# Patient Record
Sex: MALE | Race: WHITE | NOT HISPANIC OR LATINO | Employment: OTHER | URBAN - NONMETROPOLITAN AREA
[De-identification: names, ages, dates, MRNs, and addresses within clinical notes are randomized per-mention and may not be internally consistent; named-entity substitution may affect disease eponyms.]

---

## 2023-02-15 PROBLEM — N40.0 BPH (BENIGN PROSTATIC HYPERPLASIA): Status: ACTIVE | Noted: 2023-02-15

## 2023-02-15 PROBLEM — G47.00 INSOMNIA: Status: ACTIVE | Noted: 2023-02-15

## 2023-02-15 PROBLEM — R03.0 ELEVATED BP WITHOUT DIAGNOSIS OF HYPERTENSION: Status: ACTIVE | Noted: 2023-02-15

## 2023-02-15 PROBLEM — R73.9 ELEVATED BLOOD SUGAR: Status: ACTIVE | Noted: 2023-02-15

## 2023-02-15 PROBLEM — F51.01 PRIMARY INSOMNIA: Status: ACTIVE | Noted: 2023-02-15

## 2023-02-15 PROBLEM — M54.12 CERVICAL RADICULOPATHY: Status: ACTIVE | Noted: 2023-02-15

## 2023-02-15 PROBLEM — T70.20XA ALTITUDE SICKNESS: Status: ACTIVE | Noted: 2023-02-15

## 2023-02-15 PROBLEM — R20.0 HAND NUMBNESS: Status: ACTIVE | Noted: 2023-02-15

## 2023-02-15 PROBLEM — M48.02 NEURAL FORAMINAL STENOSIS OF CERVICAL SPINE: Status: ACTIVE | Noted: 2023-02-15

## 2023-02-15 PROBLEM — H61.21 IMPACTED CERUMEN OF RIGHT EAR: Status: ACTIVE | Noted: 2023-02-15

## 2023-02-15 PROBLEM — J02.9 ACUTE PHARYNGITIS: Status: ACTIVE | Noted: 2023-02-15

## 2023-02-15 PROBLEM — B19.20 UNSPECIFIED VIRAL HEPATITIS C WITHOUT HEPATIC COMA: Status: ACTIVE | Noted: 2023-02-15

## 2023-02-15 PROBLEM — M54.16 RIGHT LUMBAR RADICULOPATHY: Status: ACTIVE | Noted: 2023-02-15

## 2023-02-15 PROBLEM — H93.13 SUBJECTIVE TINNITUS, BILATERAL: Status: ACTIVE | Noted: 2023-02-15

## 2023-02-15 PROBLEM — M70.41 PREPATELLAR BURSITIS OF RIGHT KNEE: Status: ACTIVE | Noted: 2023-02-15

## 2023-02-15 RX ORDER — HYDROCODONE BITARTRATE AND ACETAMINOPHEN 7.5; 325 MG/1; MG/1
1 TABLET ORAL 3 TIMES DAILY PRN
COMMUNITY
Start: 2018-06-20 | End: 2023-03-28 | Stop reason: SDUPTHER

## 2023-02-15 RX ORDER — SODIUM FLUORIDE 5 MG/G
1 PASTE, DENTIFRICE DENTAL DAILY
COMMUNITY
Start: 2017-12-07 | End: 2023-03-28 | Stop reason: SINTOL

## 2023-02-15 RX ORDER — ZOLPIDEM TARTRATE 10 MG/1
10 TABLET ORAL NIGHTLY PRN
COMMUNITY
Start: 2016-03-07 | End: 2023-03-28 | Stop reason: SDUPTHER

## 2023-02-15 RX ORDER — CYCLOBENZAPRINE HCL 10 MG
10 TABLET ORAL 2 TIMES DAILY PRN
COMMUNITY
Start: 2020-05-06 | End: 2023-09-19 | Stop reason: SDUPTHER

## 2023-02-15 RX ORDER — TAMSULOSIN HYDROCHLORIDE 0.4 MG/1
0.4 CAPSULE ORAL NIGHTLY
COMMUNITY
Start: 2021-07-15 | End: 2023-03-28 | Stop reason: SDUPTHER

## 2023-03-28 ENCOUNTER — OFFICE VISIT (OUTPATIENT)
Dept: PRIMARY CARE | Facility: CLINIC | Age: 68
End: 2023-03-28
Payer: MEDICARE

## 2023-03-28 VITALS
BODY MASS INDEX: 22.62 KG/M2 | SYSTOLIC BLOOD PRESSURE: 138 MMHG | WEIGHT: 167 LBS | HEART RATE: 69 BPM | DIASTOLIC BLOOD PRESSURE: 82 MMHG | OXYGEN SATURATION: 98 % | HEIGHT: 72 IN

## 2023-03-28 DIAGNOSIS — M54.12 CERVICAL RADICULOPATHY: ICD-10-CM

## 2023-03-28 DIAGNOSIS — Z00.00 MEDICARE WELCOME EXAM: ICD-10-CM

## 2023-03-28 DIAGNOSIS — R76.8 POSITIVE HEPATITIS C ANTIBODY TEST: ICD-10-CM

## 2023-03-28 DIAGNOSIS — H93.13 SUBJECTIVE TINNITUS, BILATERAL: ICD-10-CM

## 2023-03-28 DIAGNOSIS — M48.02 NEURAL FORAMINAL STENOSIS OF CERVICAL SPINE: ICD-10-CM

## 2023-03-28 DIAGNOSIS — Z00.00 ROUTINE GENERAL MEDICAL EXAMINATION AT HEALTH CARE FACILITY: Primary | ICD-10-CM

## 2023-03-28 DIAGNOSIS — Z13.6 SCREENING FOR CARDIOVASCULAR CONDITION: ICD-10-CM

## 2023-03-28 DIAGNOSIS — Z23 NEED FOR VACCINATION: ICD-10-CM

## 2023-03-28 DIAGNOSIS — Z00.00 ROUTINE GENERAL MEDICAL EXAMINATION AT A HEALTH CARE FACILITY: ICD-10-CM

## 2023-03-28 DIAGNOSIS — F51.01 PRIMARY INSOMNIA: ICD-10-CM

## 2023-03-28 DIAGNOSIS — N40.1 BENIGN PROSTATIC HYPERPLASIA WITH LOWER URINARY TRACT SYMPTOMS, SYMPTOM DETAILS UNSPECIFIED: ICD-10-CM

## 2023-03-28 DIAGNOSIS — H61.23 CERUMEN DEBRIS ON TYMPANIC MEMBRANE OF BOTH EARS: ICD-10-CM

## 2023-03-28 PROBLEM — R73.9 ELEVATED BLOOD SUGAR: Status: RESOLVED | Noted: 2023-02-15 | Resolved: 2023-03-28

## 2023-03-28 PROBLEM — B19.20 UNSPECIFIED VIRAL HEPATITIS C WITHOUT HEPATIC COMA: Status: RESOLVED | Noted: 2023-02-15 | Resolved: 2023-03-28

## 2023-03-28 PROBLEM — J02.9 ACUTE PHARYNGITIS: Status: RESOLVED | Noted: 2023-02-15 | Resolved: 2023-03-28

## 2023-03-28 PROBLEM — H61.21 IMPACTED CERUMEN OF RIGHT EAR: Status: RESOLVED | Noted: 2023-02-15 | Resolved: 2023-03-28

## 2023-03-28 PROBLEM — M70.41 PREPATELLAR BURSITIS OF RIGHT KNEE: Status: RESOLVED | Noted: 2023-02-15 | Resolved: 2023-03-28

## 2023-03-28 PROBLEM — G47.00 INSOMNIA: Status: RESOLVED | Noted: 2023-02-15 | Resolved: 2023-03-28

## 2023-03-28 PROBLEM — R03.0 ELEVATED BP WITHOUT DIAGNOSIS OF HYPERTENSION: Status: RESOLVED | Noted: 2023-02-15 | Resolved: 2023-03-28

## 2023-03-28 PROBLEM — R20.0 HAND NUMBNESS: Status: RESOLVED | Noted: 2023-02-15 | Resolved: 2023-03-28

## 2023-03-28 PROCEDURE — G0403 EKG FOR INITIAL PREVENT EXAM: HCPCS | Performed by: FAMILY MEDICINE

## 2023-03-28 PROCEDURE — 69210 REMOVE IMPACTED EAR WAX UNI: CPT | Performed by: FAMILY MEDICINE

## 2023-03-28 PROCEDURE — 1036F TOBACCO NON-USER: CPT | Performed by: FAMILY MEDICINE

## 2023-03-28 PROCEDURE — 99213 OFFICE O/P EST LOW 20 MIN: CPT | Performed by: FAMILY MEDICINE

## 2023-03-28 PROCEDURE — 90677 PCV20 VACCINE IM: CPT | Performed by: FAMILY MEDICINE

## 2023-03-28 PROCEDURE — G0402 INITIAL PREVENTIVE EXAM: HCPCS | Performed by: FAMILY MEDICINE

## 2023-03-28 PROCEDURE — G0009 ADMIN PNEUMOCOCCAL VACCINE: HCPCS | Performed by: FAMILY MEDICINE

## 2023-03-28 PROCEDURE — 1170F FXNL STATUS ASSESSED: CPT | Performed by: FAMILY MEDICINE

## 2023-03-28 RX ORDER — HYDROCODONE BITARTRATE AND ACETAMINOPHEN 7.5; 325 MG/1; MG/1
1 TABLET ORAL 3 TIMES DAILY PRN
Qty: 20 TABLET | Refills: 0 | Status: SHIPPED | OUTPATIENT
Start: 2023-03-28 | End: 2024-05-29 | Stop reason: SDUPTHER

## 2023-03-28 RX ORDER — AMOXICILLIN 500 MG
CAPSULE ORAL
COMMUNITY

## 2023-03-28 RX ORDER — PNEUMOCOCCAL 20-VALENT CONJUGATE VACCINE 2.2; 2.2; 2.2; 2.2; 2.2; 2.2; 2.2; 2.2; 2.2; 2.2; 2.2; 2.2; 2.2; 2.2; 2.2; 2.2; 4.4; 2.2; 2.2; 2.2 UG/.5ML; UG/.5ML; UG/.5ML; UG/.5ML; UG/.5ML; UG/.5ML; UG/.5ML; UG/.5ML; UG/.5ML; UG/.5ML; UG/.5ML; UG/.5ML; UG/.5ML; UG/.5ML; UG/.5ML; UG/.5ML; UG/.5ML; UG/.5ML; UG/.5ML; UG/.5ML
0.5 INJECTION, SUSPENSION INTRAMUSCULAR ONCE
Qty: 0.5 ML | Refills: 0 | Status: SHIPPED | OUTPATIENT
Start: 2023-03-28 | End: 2023-03-28 | Stop reason: ALTCHOICE

## 2023-03-28 RX ORDER — ZOLPIDEM TARTRATE 10 MG/1
10 TABLET ORAL NIGHTLY PRN
Qty: 30 TABLET | Refills: 5 | Status: SHIPPED | OUTPATIENT
Start: 2023-03-28 | End: 2023-09-19 | Stop reason: SDUPTHER

## 2023-03-28 RX ORDER — TAMSULOSIN HYDROCHLORIDE 0.4 MG/1
0.4 CAPSULE ORAL NIGHTLY
Qty: 90 CAPSULE | Refills: 3 | Status: SHIPPED | OUTPATIENT
Start: 2023-03-28 | End: 2024-04-25

## 2023-03-28 ASSESSMENT — ENCOUNTER SYMPTOMS
UNEXPECTED WEIGHT CHANGE: 0
HEMATURIA: 0
DIFFICULTY URINATING: 0
NERVOUS/ANXIOUS: 0
SEIZURES: 0
TROUBLE SWALLOWING: 0
JOINT SWELLING: 0
APPETITE CHANGE: 0
CONFUSION: 0
FEVER: 0
SHORTNESS OF BREATH: 0
PALPITATIONS: 0
CONSTIPATION: 0
NUMBNESS: 1
DIARRHEA: 0
BLOOD IN STOOL: 0
OCCASIONAL FEELINGS OF UNSTEADINESS: 0
LOSS OF SENSATION IN FEET: 0
COLOR CHANGE: 0
DEPRESSION: 0

## 2023-03-28 ASSESSMENT — PATIENT HEALTH QUESTIONNAIRE - PHQ9
1. LITTLE INTEREST OR PLEASURE IN DOING THINGS: NOT AT ALL
1. LITTLE INTEREST OR PLEASURE IN DOING THINGS: NOT AT ALL
SUM OF ALL RESPONSES TO PHQ9 QUESTIONS 1 AND 2: 0
2. FEELING DOWN, DEPRESSED OR HOPELESS: NOT AT ALL
2. FEELING DOWN, DEPRESSED OR HOPELESS: NOT AT ALL
SUM OF ALL RESPONSES TO PHQ9 QUESTIONS 1 AND 2: 0
2. FEELING DOWN, DEPRESSED OR HOPELESS: NOT AT ALL
SUM OF ALL RESPONSES TO PHQ9 QUESTIONS 1 AND 2: 0
1. LITTLE INTEREST OR PLEASURE IN DOING THINGS: NOT AT ALL

## 2023-03-28 ASSESSMENT — ACTIVITIES OF DAILY LIVING (ADL)
DOING_HOUSEWORK: INDEPENDENT
GROCERY_SHOPPING: INDEPENDENT
BATHING: INDEPENDENT
MANAGING_FINANCES: INDEPENDENT
GROCERY_SHOPPING: INDEPENDENT
BATHING: INDEPENDENT
BATHING: INDEPENDENT
DRESSING: INDEPENDENT
TAKING_MEDICATION: INDEPENDENT
TAKING_MEDICATION: INDEPENDENT
MANAGING_FINANCES: INDEPENDENT
DRESSING: INDEPENDENT
DOING_HOUSEWORK: INDEPENDENT
GROCERY_SHOPPING: INDEPENDENT
TAKING_MEDICATION: INDEPENDENT
DRESSING: INDEPENDENT
DOING_HOUSEWORK: INDEPENDENT
MANAGING_FINANCES: INDEPENDENT

## 2023-03-28 NOTE — PROGRESS NOTES
Subjective   Reason for Visit: Kike Conde is an 67 y.o. male here for a Medicare Wellness visit.               Left sided cervical radiculopathy:  Saw NS and not surgerical   No pain or weakness but having numbness  Not interested in further testing at this point    Had plans for skiing but got covid     Htn:  Bp at home is 120-138/81-88  Very active  Checking BP multiple times a day    Hx of cerumen impactation    Insomnia:  Hx of anxiety  Failed melatonin        Patient Care Team:  Clint FIGUEROA DO as PCP - General  Clint FIGUEROA DO as PCP - [employer]vantage PCP     Review of Systems   Constitutional:  Negative for appetite change, fever and unexpected weight change.   HENT:  Negative for congestion and trouble swallowing.    Eyes:  Negative for visual disturbance.   Respiratory:  Negative for shortness of breath.    Cardiovascular:  Negative for chest pain, palpitations and leg swelling.   Gastrointestinal:  Negative for blood in stool, constipation and diarrhea.   Genitourinary:  Negative for difficulty urinating and hematuria.   Musculoskeletal:  Negative for gait problem and joint swelling.   Skin:  Negative for color change.   Allergic/Immunologic: Negative for immunocompromised state.   Neurological:  Positive for numbness. Negative for seizures and syncope.   Psychiatric/Behavioral:  Negative for confusion and suicidal ideas. The patient is not nervous/anxious.        Objective   Vitals:  /82   Pulse 69   Ht 1.829 m (6')   Wt 75.8 kg (167 lb)   SpO2 98%   BMI 22.65 kg/m²       Physical Exam  Constitutional:       General: He is not in acute distress.     Appearance: Normal appearance. He is not ill-appearing.   HENT:      Head: Normocephalic and atraumatic.      Right Ear: Tympanic membrane normal. There is impacted cerumen.      Left Ear: Tympanic membrane normal. There is impacted cerumen.      Nose: Nose normal.      Mouth/Throat:      Mouth: Mucous membranes are moist.    Eyes:      Pupils: Pupils are equal, round, and reactive to light.   Cardiovascular:      Rate and Rhythm: Normal rate and regular rhythm.      Heart sounds: No murmur heard.     No friction rub. No gallop.   Pulmonary:      Effort: Pulmonary effort is normal.      Breath sounds: Normal breath sounds.   Abdominal:      General: Abdomen is flat. There is no distension.      Palpations: Abdomen is soft.      Tenderness: There is no abdominal tenderness. There is no guarding.      Hernia: No hernia is present.   Musculoskeletal:         General: Normal range of motion.   Skin:     General: Skin is warm and dry.   Neurological:      General: No focal deficit present.      Mental Status: He is alert. Mental status is at baseline.      Cranial Nerves: No cranial nerve deficit.      Motor: No weakness.      Gait: Gait normal.   Psychiatric:         Mood and Affect: Mood normal.         Behavior: Behavior normal.         Thought Content: Thought content normal.         Judgment: Judgment normal.       Assessment/Plan   Problem List Items Addressed This Visit          Nervous    Cervical radiculopathy    Primary insomnia    Relevant Medications    zolpidem (Ambien) 10 mg tablet       Genitourinary    BPH (benign prostatic hyperplasia)    Relevant Medications    tamsulosin (Flomax) 0.4 mg 24 hr capsule       Musculoskeletal    Neural foraminal stenosis of cervical spine    Relevant Medications    HYDROcodone-acetaminophen (Norco) 7.5-325 mg tablet       Other    Subjective tinnitus, bilateral    Positive hepatitis C antibody test    Overview     Neg viral load         Medicare welcome exam - Primary     Other Visit Diagnoses       Routine general medical examination at a health care facility        Need for vaccination        Relevant Medications    pneumoc 20-les conj-dip cr,PF, (Prevnar 20, PF,) 0.5 mL vaccine            Assessment:  -Alcohol use: approx 2-3 glasses of wine     -Hep C: Negative viral load    -Insomnia:  Contract: 1/13/22, UDS: 1/13/22, OARRS: each refill    -Right-sided lumbar radiculopathy: 5/21: +lumbar mild L4/5 spinal stenosis and L5/S1 carmencita stenosis- status post discectomy    -tinnutitus: ent workup neg    -BPH: +urgency- mild symptoms    -Left sided cervical radiculopathy: severe carmencita forminal stenosis and mod spinal   Eval by NS- no surgery needed  No pain just numbness in left hand    -Elevated BP:  BP at home are ok will do diet and continue exercise. We discussed processed foods      Health Maintenance Reminder:  -Medicare: 2024  -Preventative: next visit   -Blood Work: 9/23  -PSA: 9/23  -Hep C: +   -Colonoscopy: 5/29  -Shingrix: completed  -Prevnar 20: completed   -Flu: Yearly

## 2023-03-28 NOTE — PROGRESS NOTES
Subjective   Reason for Visit: Kike Conde is an 67 y.o. male here for a Medicare Wellness visit.     Elevated BP:  At home bp is improved. Brought in list of BP    Cervical forminal stenosis:  Left hand numbness but no pain/weakness  Went to NS and PT and no further workup needed    Insomnia:  On ambien  Failed otc options  Has been on it for multiple years  Contract signed today    LBP:  Used pain medicine rarely- only when doing big hikes         Patient Care Team:  Clint FIGUEROA DO as PCP - General  Clint FIGUEROA DO as PCP - [employer]vantage PCP     Review of Systems   Constitutional:  Negative for appetite change, fever and unexpected weight change.   HENT:  Negative for congestion and trouble swallowing.    Eyes:  Negative for visual disturbance.   Respiratory:  Negative for shortness of breath.    Cardiovascular:  Negative for chest pain, palpitations and leg swelling.   Gastrointestinal:  Negative for blood in stool, constipation and diarrhea.   Genitourinary:  Negative for difficulty urinating and hematuria.   Musculoskeletal:  Negative for gait problem and joint swelling.   Skin:  Negative for color change.   Allergic/Immunologic: Negative for immunocompromised state.   Neurological:  Positive for numbness (left hand). Negative for seizures and syncope.   Psychiatric/Behavioral:  Negative for confusion and suicidal ideas. The patient is not nervous/anxious.        Objective   Vitals:  /82   Pulse 69   Ht 1.829 m (6')   Wt 75.8 kg (167 lb)   SpO2 98%   BMI 22.65 kg/m²       Physical Exam  Constitutional:       General: He is not in acute distress.     Appearance: Normal appearance. He is not ill-appearing.   HENT:      Head: Normocephalic and atraumatic.      Right Ear: Tympanic membrane normal. There is impacted cerumen.      Left Ear: Tympanic membrane normal. There is impacted cerumen.      Nose: Nose normal.      Mouth/Throat:      Mouth: Mucous membranes are moist.    Eyes:      Pupils: Pupils are equal, round, and reactive to light.   Cardiovascular:      Rate and Rhythm: Normal rate and regular rhythm.      Heart sounds: No murmur heard.     No friction rub. No gallop.   Pulmonary:      Effort: Pulmonary effort is normal.      Breath sounds: Normal breath sounds.   Abdominal:      General: Abdomen is flat. There is no distension.      Palpations: Abdomen is soft.      Tenderness: There is no abdominal tenderness. There is no guarding.      Hernia: No hernia is present.   Musculoskeletal:         General: Normal range of motion.   Skin:     General: Skin is warm and dry.   Neurological:      General: No focal deficit present.      Mental Status: He is alert. Mental status is at baseline.      Cranial Nerves: No cranial nerve deficit.      Motor: No weakness.      Gait: Gait normal.   Psychiatric:         Mood and Affect: Mood normal.         Behavior: Behavior normal.         Thought Content: Thought content normal.         Judgment: Judgment normal.         Assessment/Plan   Problem List Items Addressed This Visit          Nervous    Cervical radiculopathy    Primary insomnia    Relevant Medications    zolpidem (Ambien) 10 mg tablet       Genitourinary    BPH (benign prostatic hyperplasia)    Relevant Medications    tamsulosin (Flomax) 0.4 mg 24 hr capsule       Musculoskeletal    Neural foraminal stenosis of cervical spine    Relevant Medications    HYDROcodone-acetaminophen (Norco) 7.5-325 mg tablet       Other    Subjective tinnitus, bilateral    Positive hepatitis C antibody test    Overview     Neg viral load         Medicare welcome exam     Other Visit Diagnoses       Routine general medical examination at health care facility    -  Primary    Routine general medical examination at a health care facility        Need for vaccination        Relevant Orders    Pneumococcal conjugate vaccine, 20-valent, adult (PREVNAR 20) (Completed)    Cerumen debris on tympanic membrane  of both ears        Relevant Orders    Ear cerumen removal    Screening for cardiovascular condition        Relevant Orders    ECG 12 lead          Assessment:  -Alcohol use: approx 2-3 glasses of wine     -Hep C: Negative viral load    -Insomnia:   Ambien  Contract: 3/2024, UDS: 1/13/22, OARRS: each refill    -Right-sided lumbar radiculopathy:   5/21: +lumbar mild L4/5 spinal stenosis and L5/S1 carmencita stenosis- status post discectomy  Norco rarely when in a lot of pain    -tinnutitus: ent workup neg    -BPH: +urgency- mild symptoms    -cerumen impactaiton  Flushed today by me    -Left sided cervical radiculopathy:   More numbness- no pain or weakness    severe carmencita forminal stenosis and mod spinal   No surgery or pt needed    - Elevated BP:  Nml at home  Holding on meds     Health Maintenance Reminder:  -Medicare: 2024  -Blood Work: 7/23  -PSA: 7/23  -Hep C: +  -Colonoscopy: 5/29  -Shingrix: completed  -Prevnar: completed   -Flu: Yearly

## 2023-06-20 DIAGNOSIS — T70.20XD EFFECTS OF HIGH ALTITUDE, SUBSEQUENT ENCOUNTER: Primary | ICD-10-CM

## 2023-06-20 RX ORDER — DEXAMETHASONE 4 MG/1
TABLET ORAL
Qty: 14 TABLET | Refills: 0 | Status: SHIPPED | OUTPATIENT
Start: 2023-06-20 | End: 2024-05-29 | Stop reason: SDUPTHER

## 2023-09-19 ENCOUNTER — OFFICE VISIT (OUTPATIENT)
Dept: PRIMARY CARE | Facility: CLINIC | Age: 68
End: 2023-09-19
Payer: MEDICARE

## 2023-09-19 VITALS
SYSTOLIC BLOOD PRESSURE: 132 MMHG | DIASTOLIC BLOOD PRESSURE: 92 MMHG | OXYGEN SATURATION: 99 % | HEIGHT: 72 IN | BODY MASS INDEX: 22.48 KG/M2 | HEART RATE: 66 BPM | WEIGHT: 166 LBS

## 2023-09-19 DIAGNOSIS — Z13.0 SCREENING FOR DEFICIENCY ANEMIA: ICD-10-CM

## 2023-09-19 DIAGNOSIS — Z12.5 PROSTATE CANCER SCREENING: ICD-10-CM

## 2023-09-19 DIAGNOSIS — Z13.1 DIABETES MELLITUS SCREENING: ICD-10-CM

## 2023-09-19 DIAGNOSIS — Z13.220 LIPID SCREENING: ICD-10-CM

## 2023-09-19 DIAGNOSIS — M54.12 CERVICAL RADICULOPATHY: ICD-10-CM

## 2023-09-19 DIAGNOSIS — R20.0 HAND NUMBNESS: ICD-10-CM

## 2023-09-19 DIAGNOSIS — Z00.00 ROUTINE GENERAL MEDICAL EXAMINATION AT A HEALTH CARE FACILITY: Primary | ICD-10-CM

## 2023-09-19 DIAGNOSIS — F51.01 PRIMARY INSOMNIA: ICD-10-CM

## 2023-09-19 LAB
ALANINE AMINOTRANSFERASE (SGPT) (U/L) IN SER/PLAS: 22 U/L (ref 10–52)
ALBUMIN (G/DL) IN SER/PLAS: 4.5 G/DL (ref 3.4–5)
ALKALINE PHOSPHATASE (U/L) IN SER/PLAS: 49 U/L (ref 33–136)
ANION GAP IN SER/PLAS: 14 MMOL/L (ref 10–20)
ASPARTATE AMINOTRANSFERASE (SGOT) (U/L) IN SER/PLAS: 31 U/L (ref 9–39)
BILIRUBIN TOTAL (MG/DL) IN SER/PLAS: 0.5 MG/DL (ref 0–1.2)
CALCIUM (MG/DL) IN SER/PLAS: 9.6 MG/DL (ref 8.6–10.3)
CARBON DIOXIDE, TOTAL (MMOL/L) IN SER/PLAS: 28 MMOL/L (ref 21–32)
CHLORIDE (MMOL/L) IN SER/PLAS: 101 MMOL/L (ref 98–107)
CHOLESTEROL (MG/DL) IN SER/PLAS: 196 MG/DL (ref 0–199)
CHOLESTEROL IN HDL (MG/DL) IN SER/PLAS: 64.4 MG/DL
CHOLESTEROL/HDL RATIO: 3
CREATININE (MG/DL) IN SER/PLAS: 0.76 MG/DL (ref 0.5–1.3)
ERYTHROCYTE DISTRIBUTION WIDTH (RATIO) BY AUTOMATED COUNT: 12 % (ref 11.5–14.5)
ERYTHROCYTE MEAN CORPUSCULAR HEMOGLOBIN CONCENTRATION (G/DL) BY AUTOMATED: 34.1 G/DL (ref 32–36)
ERYTHROCYTE MEAN CORPUSCULAR VOLUME (FL) BY AUTOMATED COUNT: 95 FL (ref 80–100)
ERYTHROCYTES (10*6/UL) IN BLOOD BY AUTOMATED COUNT: 4.61 X10E12/L (ref 4.5–5.9)
GFR MALE: >90 ML/MIN/1.73M2
GLUCOSE (MG/DL) IN SER/PLAS: 86 MG/DL (ref 74–99)
HEMATOCRIT (%) IN BLOOD BY AUTOMATED COUNT: 44 % (ref 41–52)
HEMOGLOBIN (G/DL) IN BLOOD: 15 G/DL (ref 13.5–17.5)
LDL: 114 MG/DL (ref 0–99)
LEUKOCYTES (10*3/UL) IN BLOOD BY AUTOMATED COUNT: 5.4 X10E9/L (ref 4.4–11.3)
PLATELETS (10*3/UL) IN BLOOD AUTOMATED COUNT: 303 X10E9/L (ref 150–450)
POTASSIUM (MMOL/L) IN SER/PLAS: 4.5 MMOL/L (ref 3.5–5.3)
PROTEIN TOTAL: 7.2 G/DL (ref 6.4–8.2)
SODIUM (MMOL/L) IN SER/PLAS: 138 MMOL/L (ref 136–145)
TRIGLYCERIDE (MG/DL) IN SER/PLAS: 90 MG/DL (ref 0–149)
UREA NITROGEN (MG/DL) IN SER/PLAS: 14 MG/DL (ref 6–23)
VLDL: 18 MG/DL (ref 0–40)

## 2023-09-19 PROCEDURE — 1126F AMNT PAIN NOTED NONE PRSNT: CPT | Performed by: FAMILY MEDICINE

## 2023-09-19 PROCEDURE — 80053 COMPREHEN METABOLIC PANEL: CPT

## 2023-09-19 PROCEDURE — 85027 COMPLETE CBC AUTOMATED: CPT

## 2023-09-19 PROCEDURE — G0103 PSA SCREENING: HCPCS

## 2023-09-19 PROCEDURE — 1036F TOBACCO NON-USER: CPT | Performed by: FAMILY MEDICINE

## 2023-09-19 PROCEDURE — 1159F MED LIST DOCD IN RCRD: CPT | Performed by: FAMILY MEDICINE

## 2023-09-19 PROCEDURE — 80061 LIPID PANEL: CPT

## 2023-09-19 PROCEDURE — 99397 PER PM REEVAL EST PAT 65+ YR: CPT | Performed by: FAMILY MEDICINE

## 2023-09-19 PROCEDURE — 99213 OFFICE O/P EST LOW 20 MIN: CPT | Performed by: FAMILY MEDICINE

## 2023-09-19 RX ORDER — ZOLPIDEM TARTRATE 10 MG/1
10 TABLET ORAL NIGHTLY PRN
Qty: 30 TABLET | Refills: 5 | Status: SHIPPED | OUTPATIENT
Start: 2023-09-19 | End: 2024-04-03 | Stop reason: SDUPTHER

## 2023-09-19 RX ORDER — CYCLOBENZAPRINE HCL 10 MG
10 TABLET ORAL 2 TIMES DAILY PRN
Qty: 90 TABLET | Refills: 1 | Status: SHIPPED | OUTPATIENT
Start: 2023-09-19 | End: 2024-05-29 | Stop reason: SDUPTHER

## 2023-09-19 ASSESSMENT — PATIENT HEALTH QUESTIONNAIRE - PHQ9
SUM OF ALL RESPONSES TO PHQ9 QUESTIONS 1 AND 2: 0
1. LITTLE INTEREST OR PLEASURE IN DOING THINGS: NOT AT ALL
2. FEELING DOWN, DEPRESSED OR HOPELESS: NOT AT ALL
1. LITTLE INTEREST OR PLEASURE IN DOING THINGS: NOT AT ALL
2. FEELING DOWN, DEPRESSED OR HOPELESS: NOT AT ALL
SUM OF ALL RESPONSES TO PHQ9 QUESTIONS 1 AND 2: 0

## 2023-09-19 ASSESSMENT — ENCOUNTER SYMPTOMS
NERVOUS/ANXIOUS: 0
CONSTIPATION: 0
SHORTNESS OF BREATH: 0
FEVER: 0
DIARRHEA: 0
PALPITATIONS: 0
SEIZURES: 0
JOINT SWELLING: 0
APPETITE CHANGE: 0
TROUBLE SWALLOWING: 0
COLOR CHANGE: 0
HEMATURIA: 0
BLOOD IN STOOL: 0
CONFUSION: 0
UNEXPECTED WEIGHT CHANGE: 0
DIFFICULTY URINATING: 0
NUMBNESS: 1

## 2023-09-19 NOTE — PROGRESS NOTES
Subjective   Reason for Visit: Kike Conde is an 68 y.o. male here for a Medicare Wellness visit.     Elevated BP:  At home bp is improved. Brought in list of BP    Cervical forminal stenosis:  Left hand numbness but no pain/weakness    Left wrist pain:  -wearing brace when doing a lot of activities   -was out of town and developed swelling  -no redness/ttp    Insomnia:  On ambien  Failed otc options  Has been on it for multiple years  Contract signed today    LBP:  Used pain medicine rarely- only when doing big hikes               Patient Care Team:  Clint FIGUEROA DO as PCP - General  Clint FIGUEROA DO as PCP - [employer]vantage PCP     Review of Systems   Constitutional:  Negative for appetite change, fever and unexpected weight change.   HENT:  Negative for congestion and trouble swallowing.    Eyes:  Negative for visual disturbance.   Respiratory:  Negative for shortness of breath.    Cardiovascular:  Negative for chest pain, palpitations and leg swelling.   Gastrointestinal:  Negative for blood in stool, constipation and diarrhea.   Genitourinary:  Negative for difficulty urinating and hematuria.   Musculoskeletal:  Negative for gait problem and joint swelling.   Skin:  Negative for color change.   Allergic/Immunologic: Negative for immunocompromised state.   Neurological:  Positive for numbness (left hand). Negative for seizures and syncope.   Psychiatric/Behavioral:  Negative for confusion and suicidal ideas. The patient is not nervous/anxious.        Objective   Vitals:  BP (!) 132/92   Pulse 66   Ht 1.829 m (6')   Wt 75.3 kg (166 lb)   SpO2 99%   BMI 22.51 kg/m²       Physical Exam  Constitutional:       General: He is not in acute distress.     Appearance: Normal appearance. He is not ill-appearing.   HENT:      Head: Normocephalic and atraumatic.      Right Ear: Tympanic membrane normal. There is impacted cerumen.      Left Ear: Tympanic membrane normal. There is impacted cerumen.       Nose: Nose normal.      Mouth/Throat:      Mouth: Mucous membranes are moist.   Eyes:      Pupils: Pupils are equal, round, and reactive to light.   Cardiovascular:      Rate and Rhythm: Normal rate and regular rhythm.      Heart sounds: No murmur heard.     No friction rub. No gallop.   Pulmonary:      Effort: Pulmonary effort is normal.      Breath sounds: Normal breath sounds.   Abdominal:      General: Abdomen is flat. There is no distension.      Palpations: Abdomen is soft.      Tenderness: There is no abdominal tenderness. There is no guarding.      Hernia: No hernia is present.   Musculoskeletal:         General: Normal range of motion.   Skin:     General: Skin is warm and dry.   Neurological:      General: No focal deficit present.      Mental Status: He is alert. Mental status is at baseline.      Cranial Nerves: No cranial nerve deficit.      Motor: No weakness.      Gait: Gait normal.   Psychiatric:         Mood and Affect: Mood normal.         Behavior: Behavior normal.         Thought Content: Thought content normal.         Judgment: Judgment normal.         Assessment/Plan   Problem List Items Addressed This Visit    None  Assessment:  -Alcohol use: approx 2-3 glasses of wine     -Hep C: Negative viral load    -Insomnia:   Ambien  Contract: 3/2024, UDS: 1/13/22, OARRS: each refill    -Right-sided lumbar radiculopathy:   5/21: +lumbar mild L4/5 spinal stenosis and L5/S1 carmencita stenosis- status post discectomy  Norco rarely when in a lot of pain    -tinnutitus: ent workup neg    -BPH: +urgency- mild symptoms    -Left sided cervical radiculopathy:   More numbness- no pain or weakness    -severe carmencita forminal stenosis and mod spinal   No surgery or pt needed         - Elevated BP:        Nml at home        Holding on meds     -Left wrist pain:  Wants to wait on testing  Suspect CTS as well- EMG     Health Maintenance Reminder:  -Medicare: 2024  -Preventative: 2024   -Blood Work: today  -PSA: today    -Hep C: +  -Colonoscopy: 5/29  -Shingrix: completed  -Prevnar: completed   -Flu: Yearly gets a pharm

## 2023-09-20 LAB — PROSTATE SPECIFIC ANTIGEN,SCREEN: 1.33 NG/ML (ref 0–4)

## 2023-11-20 ENCOUNTER — OFFICE VISIT (OUTPATIENT)
Dept: PRIMARY CARE | Facility: CLINIC | Age: 68
End: 2023-11-20
Payer: MEDICARE

## 2023-11-20 VITALS
OXYGEN SATURATION: 99 % | SYSTOLIC BLOOD PRESSURE: 138 MMHG | DIASTOLIC BLOOD PRESSURE: 82 MMHG | WEIGHT: 165 LBS | BODY MASS INDEX: 22.38 KG/M2 | HEART RATE: 67 BPM

## 2023-11-20 DIAGNOSIS — J01.00 ACUTE NON-RECURRENT MAXILLARY SINUSITIS: Primary | ICD-10-CM

## 2023-11-20 PROCEDURE — 1126F AMNT PAIN NOTED NONE PRSNT: CPT | Performed by: FAMILY MEDICINE

## 2023-11-20 PROCEDURE — 99213 OFFICE O/P EST LOW 20 MIN: CPT | Performed by: FAMILY MEDICINE

## 2023-11-20 PROCEDURE — 1159F MED LIST DOCD IN RCRD: CPT | Performed by: FAMILY MEDICINE

## 2023-11-20 PROCEDURE — 1036F TOBACCO NON-USER: CPT | Performed by: FAMILY MEDICINE

## 2023-11-20 RX ORDER — AMOXICILLIN AND CLAVULANATE POTASSIUM 875; 125 MG/1; MG/1
875 TABLET, FILM COATED ORAL 2 TIMES DAILY
Qty: 20 TABLET | Refills: 0 | Status: SHIPPED | OUTPATIENT
Start: 2023-11-20 | End: 2023-11-30

## 2023-11-20 ASSESSMENT — ENCOUNTER SYMPTOMS
NAUSEA: 0
COUGH: 1
CHOKING: 0
DIARRHEA: 1
HEADACHES: 0
TROUBLE SWALLOWING: 0
NECK PAIN: 0
MYALGIAS: 0
SORE THROAT: 1
SEIZURES: 0
SINUS PRESSURE: 0
RHINORRHEA: 1
CARDIOVASCULAR NEGATIVE: 1
FATIGUE: 1
LIGHT-HEADEDNESS: 0
APPETITE CHANGE: 1
DYSURIA: 0
NECK STIFFNESS: 0
EYES NEGATIVE: 1
CHEST TIGHTNESS: 1
VOMITING: 0
SINUS PAIN: 0
RECTAL PAIN: 0
CHILLS: 0
DIZZINESS: 0
BLOOD IN STOOL: 0
SHORTNESS OF BREATH: 0
FEVER: 0
ABDOMINAL PAIN: 0

## 2023-11-20 NOTE — PROGRESS NOTES
"Subjective   Patient ID: Kike Conde is a 68 y.o. male who presents for chest congestion (1wk over the counter meds is not working bowel movements \"are shooting out of him and smells very strange\" covid neg and no fever).    HPI   Cold sx that started last Monday, gotten worse. Associated cough, sore throat, congestion, upper airway tightness. Phlegm was clear, now green, non bloody. Feels dehydrated, little tired. Some trouble sleeping. OTC meds (dayquil/ nyquil) not working anymore.  COVID test was negative twice.   No fever, chills, sinus pain, HA, abd pain, nv, myalgias, or dizziness     Diarrhea that started 2d ago (stools are normally loose) watery, bad smell, no pain no blood, no strong abx. No different foods. Since taking otc meds    Travelling soon so wants to see if he needs abx.   No one in the family sick.  Vax: flu and covid    Repeat /82    Review of Systems   Constitutional:  Positive for appetite change and fatigue. Negative for chills and fever.   HENT:  Positive for congestion, rhinorrhea and sore throat. Negative for sinus pressure, sinus pain and trouble swallowing.    Eyes: Negative.    Respiratory:  Positive for cough and chest tightness. Negative for choking and shortness of breath.    Cardiovascular: Negative.    Gastrointestinal:  Positive for diarrhea. Negative for abdominal pain, blood in stool, nausea, rectal pain and vomiting.   Genitourinary:  Negative for dysuria.   Musculoskeletal:  Negative for myalgias, neck pain and neck stiffness.   Skin: Negative.    Neurological:  Negative for dizziness, seizures, light-headedness and headaches.       Objective   /86   Pulse 67   Wt 74.8 kg (165 lb)   SpO2 99%   BMI 22.38 kg/m²     Physical Exam  Constitutional:       General: He is not in acute distress.     Appearance: Normal appearance. He is not ill-appearing.   HENT:      Head: Normocephalic and atraumatic.      Right Ear: Tympanic membrane normal.      Left Ear: " Tympanic membrane normal.      Nose: Congestion present.      Mouth/Throat:      Mouth: Mucous membranes are moist.      Pharynx: Posterior oropharyngeal erythema present.   Eyes:      Pupils: Pupils are equal, round, and reactive to light.   Cardiovascular:      Rate and Rhythm: Normal rate and regular rhythm.      Heart sounds: No murmur heard.     No friction rub. No gallop.   Pulmonary:      Effort: Pulmonary effort is normal.      Breath sounds: Normal breath sounds.   Abdominal:      General: Abdomen is flat. There is no distension.      Palpations: Abdomen is soft.      Tenderness: There is no abdominal tenderness. There is no guarding.      Hernia: No hernia is present.   Musculoskeletal:         General: Normal range of motion.   Skin:     General: Skin is warm and dry.   Neurological:      General: No focal deficit present.      Mental Status: He is alert. Mental status is at baseline.      Cranial Nerves: No cranial nerve deficit.      Motor: No weakness.      Gait: Gait normal.   Psychiatric:         Mood and Affect: Mood normal.         Behavior: Behavior normal.         Thought Content: Thought content normal.         Judgment: Judgment normal.         Assessment/Plan   Problem List Items Addressed This Visit    None    Assessment:  -Alcohol use: approx 2-3 glasses of wine      -Hep C: Negative viral load     -Insomnia:   Ambien  Contract: 3/2024, UDS: 1/13/22, OARRS: each refill     -Right-sided lumbar radiculopathy:   5/21: +lumbar mild L4/5 spinal stenosis and L5/S1 carmencita stenosis- status post discectomy  Norco rarely when in a lot of pain     -tinnutitus: ent workup neg     -BPH: +urgency- mild symptoms     -Left sided cervical radiculopathy:   More numbness- no pain or weakness     -severe carmencita forminal stenosis and mod spinal   No surgery or pt needed          - Elevated BP:        Nml at home        Holding on meds      -Left wrist pain:  Wants to wait on testing  Suspect CTS as well- EM[facility]       Health Maintenance Reminder:  -Medicare: 2024  -Preventative: 2024   -Blood Work: today  -PSA: today   -Hep C: +  -Colonoscopy: 5/29  -Shingrix: completed  -Prevnar: completed   -Flu: Yearly gets a pharm

## 2023-12-15 ENCOUNTER — OFFICE VISIT (OUTPATIENT)
Dept: PRIMARY CARE | Facility: CLINIC | Age: 68
End: 2023-12-15
Payer: MEDICARE

## 2023-12-15 VITALS
HEART RATE: 81 BPM | DIASTOLIC BLOOD PRESSURE: 88 MMHG | BODY MASS INDEX: 22.78 KG/M2 | OXYGEN SATURATION: 99 % | WEIGHT: 168 LBS | TEMPERATURE: 98.3 F | SYSTOLIC BLOOD PRESSURE: 130 MMHG

## 2023-12-15 DIAGNOSIS — J34.89 SINUS PRESSURE: ICD-10-CM

## 2023-12-15 DIAGNOSIS — Z12.83 SKIN CANCER SCREENING: Primary | ICD-10-CM

## 2023-12-15 PROCEDURE — 1126F AMNT PAIN NOTED NONE PRSNT: CPT | Performed by: FAMILY MEDICINE

## 2023-12-15 PROCEDURE — 1036F TOBACCO NON-USER: CPT | Performed by: FAMILY MEDICINE

## 2023-12-15 PROCEDURE — 99213 OFFICE O/P EST LOW 20 MIN: CPT | Performed by: FAMILY MEDICINE

## 2023-12-15 PROCEDURE — 1159F MED LIST DOCD IN RCRD: CPT | Performed by: FAMILY MEDICINE

## 2023-12-15 RX ORDER — PREDNISONE 20 MG/1
40 TABLET ORAL DAILY
Qty: 10 TABLET | Refills: 0 | Status: SHIPPED | OUTPATIENT
Start: 2023-12-15 | End: 2023-12-20

## 2023-12-15 ASSESSMENT — ENCOUNTER SYMPTOMS
RECTAL PAIN: 0
RHINORRHEA: 1
NECK PAIN: 0
DIARRHEA: 1
SHORTNESS OF BREATH: 0
SEIZURES: 0
EYES NEGATIVE: 1
NAUSEA: 0
NECK STIFFNESS: 0
BLOOD IN STOOL: 0
TROUBLE SWALLOWING: 0
DIZZINESS: 0
VOMITING: 0
CHOKING: 0
CARDIOVASCULAR NEGATIVE: 1
COUGH: 1
ABDOMINAL PAIN: 0
SINUS PAIN: 0
CHEST TIGHTNESS: 1
FEVER: 0
LIGHT-HEADEDNESS: 0
FATIGUE: 1
CHILLS: 0
DYSURIA: 0
APPETITE CHANGE: 1
SINUS PRESSURE: 0
MYALGIAS: 0
SORE THROAT: 1
HEADACHES: 0

## 2023-12-15 NOTE — PROGRESS NOTES
Subjective   Patient ID: Kike Conde is a 68 y.o. male who presents for Cough (Sinus congestion, worse in the mornings ).    Cough  Associated symptoms include rhinorrhea and a sore throat. Pertinent negatives include no chills, fever, headaches, myalgias or shortness of breath.          Review of Systems   Constitutional:  Positive for appetite change and fatigue. Negative for chills and fever.   HENT:  Positive for congestion, rhinorrhea and sore throat. Negative for sinus pressure, sinus pain and trouble swallowing.    Eyes: Negative.    Respiratory:  Positive for cough and chest tightness. Negative for choking and shortness of breath.    Cardiovascular: Negative.    Gastrointestinal:  Positive for diarrhea. Negative for abdominal pain, blood in stool, nausea, rectal pain and vomiting.   Genitourinary:  Negative for dysuria.   Musculoskeletal:  Negative for myalgias, neck pain and neck stiffness.   Skin: Negative.    Neurological:  Negative for dizziness, seizures, light-headedness and headaches.       Objective   BP (!) 190/30   Pulse 81   Temp 36.8 °C (98.3 °F)   Wt 76.2 kg (168 lb)   SpO2 99%   BMI 22.78 kg/m²     Physical Exam  Constitutional:       General: He is not in acute distress.     Appearance: Normal appearance. He is not ill-appearing.   HENT:      Head: Normocephalic and atraumatic.      Right Ear: Tympanic membrane normal.      Left Ear: Tympanic membrane normal.      Nose: Congestion present.      Mouth/Throat:      Mouth: Mucous membranes are moist.      Pharynx: Posterior oropharyngeal erythema present.   Eyes:      Pupils: Pupils are equal, round, and reactive to light.   Cardiovascular:      Rate and Rhythm: Normal rate and regular rhythm.      Heart sounds: No murmur heard.     No friction rub. No gallop.   Pulmonary:      Effort: Pulmonary effort is normal.      Breath sounds: Normal breath sounds.   Abdominal:      General: Abdomen is flat. There is no distension.      Palpations:  Abdomen is soft.      Tenderness: There is no abdominal tenderness. There is no guarding.      Hernia: No hernia is present.   Musculoskeletal:         General: Normal range of motion.   Skin:     General: Skin is warm and dry.   Neurological:      General: No focal deficit present.      Mental Status: He is alert. Mental status is at baseline.      Cranial Nerves: No cranial nerve deficit.      Motor: No weakness.      Gait: Gait normal.   Psychiatric:         Mood and Affect: Mood normal.         Behavior: Behavior normal.         Thought Content: Thought content normal.         Judgment: Judgment normal.         Assessment/Plan   Problem List Items Addressed This Visit    None    Assessment:  -Alcohol use: approx 2-3 glasses of wine      -Hep C: Negative viral load     -Insomnia:   Ambien  Contract: 3/2024, UDS: 1/13/22, OARRS: each refill     -Right-sided lumbar radiculopathy:   5/21: +lumbar mild L4/5 spinal stenosis and L5/S1 carmencita stenosis- status post discectomy  Norco rarely when in a lot of pain     -tinnutitus: ent workup neg     -BPH: +urgency- mild symptoms     -Left sided cervical radiculopathy:   More numbness- no pain or weakness     -severe carmencita forminal stenosis and mod spinal   No surgery or pt needed          - Elevated BP:        Nml at home        Holding on meds      -Left wrist pain:  Wants to wait on testing  Suspect CTS as well- [facility]      Health Maintenance Reminder:  -Medicare: 2024  -Preventative: 2024   -Blood Work: today  -PSA: today   -Hep C: +  -Colonoscopy: 5/29  -Shingrix: completed  -Prevnar: completed   -Flu: Yearly gets a pharm

## 2024-01-05 ENCOUNTER — OFFICE VISIT (OUTPATIENT)
Dept: DERMATOLOGY | Facility: CLINIC | Age: 69
End: 2024-01-05
Payer: MEDICARE

## 2024-01-05 DIAGNOSIS — L82.1 SEBORRHEIC KERATOSIS: ICD-10-CM

## 2024-01-05 DIAGNOSIS — D48.5 NEOPLASM OF UNCERTAIN BEHAVIOR OF SKIN: Primary | ICD-10-CM

## 2024-01-05 DIAGNOSIS — D22.9 BENIGN NEVUS: ICD-10-CM

## 2024-01-05 DIAGNOSIS — L57.9 SKIN CHANGES DUE TO CHRONIC EXPOSURE TO NONIONIZING RADIATION: ICD-10-CM

## 2024-01-05 DIAGNOSIS — D23.9 DYSPLASTIC NEVUS: ICD-10-CM

## 2024-01-05 DIAGNOSIS — L57.0 ACTINIC KERATOSIS: ICD-10-CM

## 2024-01-05 DIAGNOSIS — L81.4 LENTIGO: ICD-10-CM

## 2024-01-05 DIAGNOSIS — D18.01 ANGIOMA OF SKIN: ICD-10-CM

## 2024-01-05 PROCEDURE — 1036F TOBACCO NON-USER: CPT | Performed by: NURSE PRACTITIONER

## 2024-01-05 PROCEDURE — 88342 IMHCHEM/IMCYTCHM 1ST ANTB: CPT | Mod: TC,DER | Performed by: NURSE PRACTITIONER

## 2024-01-05 PROCEDURE — 1160F RVW MEDS BY RX/DR IN RCRD: CPT | Performed by: NURSE PRACTITIONER

## 2024-01-05 PROCEDURE — 1126F AMNT PAIN NOTED NONE PRSNT: CPT | Performed by: NURSE PRACTITIONER

## 2024-01-05 PROCEDURE — 88342 IMHCHEM/IMCYTCHM 1ST ANTB: CPT | Performed by: DERMATOLOGY

## 2024-01-05 PROCEDURE — 99203 OFFICE O/P NEW LOW 30 MIN: CPT | Performed by: NURSE PRACTITIONER

## 2024-01-05 PROCEDURE — 88341 IMHCHEM/IMCYTCHM EA ADD ANTB: CPT | Performed by: DERMATOLOGY

## 2024-01-05 PROCEDURE — 11102 TANGNTL BX SKIN SINGLE LES: CPT | Performed by: NURSE PRACTITIONER

## 2024-01-05 PROCEDURE — 88305 TISSUE EXAM BY PATHOLOGIST: CPT | Performed by: DERMATOLOGY

## 2024-01-05 PROCEDURE — 17000 DESTRUCT PREMALG LESION: CPT | Performed by: NURSE PRACTITIONER

## 2024-01-05 PROCEDURE — 1159F MED LIST DOCD IN RCRD: CPT | Performed by: NURSE PRACTITIONER

## 2024-01-05 NOTE — PROGRESS NOTES
Subjective     Kike Conde is a 68 y.o. male who presents for the following: Skin Check.     Review of Systems:  No other skin or systemic complaints other than what is documented elsewhere in the note.    The following portions of the chart were reviewed this encounter and updated as appropriate:   Tobacco  Allergies  Meds  Problems  Med Hx  Surg Hx         Skin Cancer History  No skin cancer on file.      Specialty Problems    None       Objective   Well appearing patient in no apparent distress; mood and affect are within normal limits.    A full examination was performed including scalp, head, eyes, ears, nose, lips, neck, chest, axillae, abdomen, back, buttocks, bilateral upper extremities, bilateral lower extremities, hands, feet, fingers, toes, fingernails, and toenails. All findings within normal limits unless otherwise noted below.      Assessment/Plan   1. Neoplasm of uncertain behavior of skin  Left Upper Back  6 mm irregular shape grey tan dark brown macule with milky discoloration asymmetrically and pigment drop out              Lesion biopsy  Type of biopsy: tangential    Informed consent: discussed and consent obtained    Timeout: patient name, date of birth, surgical site, and procedure verified    Procedure prep:  Patient was prepped and draped  Anesthesia: the lesion was anesthetized in a standard fashion    Anesthetic:  1% lidocaine plain local infiltration  Instrument used: DermaBlade    Hemostasis achieved with: electrodesiccation    Outcome: patient tolerated procedure well    Post-procedure details: wound care instructions given    Additional details:  Cleaned area with isopropyl alcohol prior to anesthesia or biopsy. Applied thin layer of vaseline and covered with bandaid after procedure      Staff Communication: Dermatology Local Anesthesia: 1 % Plain Lidocaine - Amount:0.5 ml    Specimen 1 - Dermatopathology- DERM LAB  Differential Diagnosis: MM vs DN  Check Margins Yes/No?:     Comments:    Dermpath Lab: Routine histopathology    NUB  - Given uncertainty in clinical diagnosis, shave biopsy is recommended in clinic today.  - The patient expressed understanding, is in agreement with this plan, and wishes to proceed with biopsy.  - Oral and written wound care instructions provided.  - Advised patient that the office will call within 2 weeks to discuss biopsy results.      2. Actinic keratosis  Dorsum of Nose  Thin erythematous papules with gritty scale    WHAT IS ACTINIC KERATOSIS?   - Actinic keratosis (AK) is a skin condition caused by sun damage. It causes scaly, rough, or bumpy spots on the skin.  - If left alone, AKs may turn into a skin cancer. People who burn easily or have trouble tanning are at more risk for developing AKs.   - There is no one test for AKs and diagnosis is made by clinical appearance. Treatment options include cryotherapy, therapy with lights, and various creams (e.g., topical 5-fluorocuracil, imiquimod).       To lower the chance of getting AK, you can:       ?  Stay out of the sun in the middle of the day (from 10 a.m. to 4 p.m.)       ?  Wear sunscreen - An SPF of at least 30 is best. The SPF number is on the sunscreen bottle or tube.       ?  Wear a wide-brimmed hat, long-sleeved shirt, long pants, or long skirt outside. A baseball hat does not give much protection.        ?  Do not use tanning beds.        ?  Keep a low-fat diet, less than 21% of calories should come from fat       ?  Take Vitamin B3 (nicotinomide) 500mg twice daily.      YOUR TREATMENT PLAN  - At this time I recommend treatment with cryotherapy.  - Possible side effects of liquid nitrogen treatment reviewed including formation of blisters, crusting, tenderness, scar, and discoloration which may be permanent.  - Patient advised to return the office for re-evaluation if the treated lesion(s) do not resolve within 4-6 weeks. Patient verbalizes understanding.    Destr of lesion - Dorsum of  Nose  Complexity: simple    Destruction method: cryotherapy    Informed consent: discussed and consent obtained    Timeout:  patient name, date of birth, surgical site, and procedure verified  Lesion destroyed using liquid nitrogen: Yes    Cryotherapy cycles:  2  Outcome: patient tolerated procedure well with no complications    Post-procedure details: wound care instructions given      3. Benign nevus  Scattered, uniform and benign-appearing, regular brown melanocytic papules and macules.    The present appearance of the lesion is not worrisome but it should continue to be observed and testing/treatment may be warranted if change occurs.    4. Seborrheic keratosis  Stuck on verrucous, tan-brown papules and plaques.      Seborrheic keratoses are common noncancerous (benign) growths of unknown cause seen in adults due to a thickening of an area of the top skin layer. Seborrheic keratoses may appear as if they are stuck on to the skin. They have distinct borders, and they may appear as papules (small, solid bumps) or plaques (solid, raised patches that are bigger than a thumbnail). They may be the same color as your skin, or they may be pink, light brown, darker brown, or very dark brown, or sometimes may appear black.    There is no way to prevent new seborrheic keratoses from forming. Seborrheic keratoses can be removed, but removal is considered a cosmetic issue and is usually not covered by insurance.    PLAN  No treatment is needed unless there is irritation from clothing, such as itching or bleeding.  2.   Some lotions containing alpha hydroxy acids, salicylic acid, or urea may make the areas feel smoother with regular use but will not eliminate them.    5. Lentigo  Scattered tan macules in sun-exposed areas.    A solar lentigo (plural, solar lentigines), also known as a sun-induced freckle or senile lentigo, is a dark (hyperpigmented) lesion caused by natural or artificial ultraviolet (UV) light. Solar  lentigines may be single or multiple. This type of lentigo is different from a simple lentigo (lentigo simplex) because it is caused by exposure to UV light. Solar lentigines are benign, but they do indicate excessive sun exposure, a risk factor for the development of skin cancer.    To prevent solar lentigines, avoid exposure to sunlight in midday (10 AM to 3 PM), wear sun-protective clothing (tightly woven clothes and hats), and apply sunscreen (SPF 30 UVA and UVB block).    The present appearance of the lesion is not worrisome but it should continue to be observed and testing/treatment may be warranted if change occurs.    6. Skin changes due to chronic exposure to nonionizing radiation  Actinic changes in the form of freckles, lentigines and hyper/hypopigmentation     ABCDEs of melanoma and atypical moles were discussed with the patient.    Patient was instructed to perform monthly self skin examination.  We recommended that the patient have regular full skin exams given an increased risk of subsequent skin cancers.    The patient was instructed to use sun protective behaviors including use of broad spectrum sunscreens and sun protective clothing to reduce risk of skin cancers.    Warning signs of non-melanoma skin cancer discussed.    7. Angioma of skin  Scattered cherry-red papule(s).    A cherry hemangioma is a small macule (small, flat, smooth area) or papule (small, solid bump) formed from an overgrowth of tiny blood vessels in the skin. Cherry hemangiomas are characteristically red or purplish in color. They often first appear in middle adulthood and usually increase in number with age. Cherry hemangiomas are noncancerous (benign) and are common in adults.    The present appearance of the lesion is not worrisome but it should continue to be observed and testing/treatment may be warranted if change occurs.        Return to clinic in 1 year for skin check/follow up or sooner if needed. May adjust follow up  based on biopsy results

## 2024-01-05 NOTE — PATIENT INSTRUCTIONS

## 2024-01-10 ENCOUNTER — TELEPHONE (OUTPATIENT)
Dept: DERMATOLOGY | Facility: CLINIC | Age: 69
End: 2024-01-10
Payer: MEDICARE

## 2024-01-10 LAB
LAB AP ASR DISCLAIMER: NORMAL
LABORATORY COMMENT REPORT: NORMAL
PATH REPORT.FINAL DX SPEC: NORMAL
PATH REPORT.GROSS SPEC: NORMAL
PATH REPORT.RELEVANT HX SPEC: NORMAL
PATH REPORT.TOTAL CANCER: NORMAL

## 2024-01-27 DIAGNOSIS — U07.1 COVID-19: Primary | ICD-10-CM

## 2024-04-03 DIAGNOSIS — F51.01 PRIMARY INSOMNIA: ICD-10-CM

## 2024-04-03 RX ORDER — ZOLPIDEM TARTRATE 10 MG/1
10 TABLET ORAL NIGHTLY PRN
Qty: 30 TABLET | Refills: 5 | Status: SHIPPED | OUTPATIENT
Start: 2024-04-03

## 2024-04-09 ENCOUNTER — DOCUMENTATION (OUTPATIENT)
Dept: PRIMARY CARE | Facility: CLINIC | Age: 69
End: 2024-04-09
Payer: MEDICARE

## 2024-04-15 ENCOUNTER — PROCEDURE VISIT (OUTPATIENT)
Dept: DERMATOLOGY | Facility: CLINIC | Age: 69
End: 2024-04-15
Payer: MEDICARE

## 2024-04-15 DIAGNOSIS — D48.5 NEOPLASM OF UNCERTAIN BEHAVIOR OF SKIN: ICD-10-CM

## 2024-04-15 NOTE — PROGRESS NOTES
Excision Operative Note    Date of Surgery:  4/15/2024  Surgeon:  Chetan Kruger DO  Office Location: DO [address] [facility]  [address]  [facility] [address]  Dept: [phone]  Dept Fax: [phone]  Referring Provider: Ramin Bangura, APRN-CNP  [address]  [address]  [place] [address]    Subjective   Kike Conde is a 68 y.o. male who presents for the following: Excision for neoplasm of uncertain behavior of skin.    According to the patient, the lesion has been present for approximately 6 months at the time of diagnosis.  The lesion is not causing symptoms.  The lesion has not been treated previously.    The patient does not have a pacemaker / defibrillator.  The patient does not have a heart valve / joint replacement.    The patient is not on blood thinners.   The patient does not have a history of hepatitis B or C.  The patient does not have a history of HIV.  The patient does not have a history of immunosuppression (e.g. organ transplantation, malignancy, medications)    Is it okay to leave a phone message with results? {Yes  No    The following portions of the chart were reviewed this encounter and updated as appropriate:         Assessment/Plan   Pre-procedure:   Obtained informed consent: written from patient  The surgical site was identified and confirmed with the patient.     Intra-operative:   Audible time out called at : 2:29 PM 04/15/24  by: Ilene Giang MA   Verified patient name, birthdate, site, specimen bottle label & requisition.    The planned procedure(s) was again reviewed with the patient. The risks of bleeding, infection, nerve damage and scarring were reviewed. The patient identity, surgical site, and planned procedure(s) were verified.     Biopsy Accession Number: ***  Neoplasm of uncertain behavior of skin  Left Upper Back    Skin excision    Informed consent: discussed and consent obtained    Timeout: patient name, date of birth,  surgical site, and procedure verified    Procedure prep:  Patient prepped in sterile fashion  Anesthesia: the lesion was anesthetized in a standard fashion    Anesthetic:  1% lidocaine w/ epinephrine 1-100,000 local infiltration  Instrument used: #15 blade    Hemostasis achieved with: electrodesiccation    Outcome: patient tolerated procedure well with no complications    Post-procedure details: sterile dressing applied and wound care instructions given    Dressing type: pressure dressing    Additional details:  The possible diagnoses were explained. Although the lesion is likely benign, the patient requests removal of the lesion because of the symptoms it is causing. Excision was discussed with the patient. The risks, benefits and potential adverse effects were reviewed. Discussion included but was not limited to the cure rate, relative cost, wound care requirements, activity restrictions, likely scar outcome and time to heal were reviewed. Alternative options including monitoring the lesion were discussed. The patient elected to proceed with excision.     Skin repair      {MOHS - Linear Closure (Optional):13895}    The final repair measured *** cm    Wound care was discussed, and the patient was given written post-operative wound care instructions.      The patient will follow up with Chetan Kruger DO as needed for any post operative problems or concerns, and will follow up with their primary dermatologist as scheduled.

## 2024-04-24 DIAGNOSIS — N40.1 BENIGN PROSTATIC HYPERPLASIA WITH LOWER URINARY TRACT SYMPTOMS, SYMPTOM DETAILS UNSPECIFIED: ICD-10-CM

## 2024-04-25 RX ORDER — TAMSULOSIN HYDROCHLORIDE 0.4 MG/1
0.4 CAPSULE ORAL NIGHTLY
Qty: 90 CAPSULE | Refills: 3 | Status: SHIPPED | OUTPATIENT
Start: 2024-04-25

## 2024-05-29 ENCOUNTER — OFFICE VISIT (OUTPATIENT)
Dept: PRIMARY CARE | Facility: CLINIC | Age: 69
End: 2024-05-29
Payer: MEDICARE

## 2024-05-29 VITALS
OXYGEN SATURATION: 98 % | BODY MASS INDEX: 22.75 KG/M2 | HEIGHT: 72 IN | DIASTOLIC BLOOD PRESSURE: 82 MMHG | WEIGHT: 168 LBS | HEART RATE: 80 BPM | SYSTOLIC BLOOD PRESSURE: 128 MMHG

## 2024-05-29 DIAGNOSIS — N40.1 BENIGN PROSTATIC HYPERPLASIA WITH LOWER URINARY TRACT SYMPTOMS, SYMPTOM DETAILS UNSPECIFIED: ICD-10-CM

## 2024-05-29 DIAGNOSIS — M48.02 NEURAL FORAMINAL STENOSIS OF CERVICAL SPINE: ICD-10-CM

## 2024-05-29 DIAGNOSIS — Z00.00 MEDICARE ANNUAL WELLNESS VISIT, SUBSEQUENT: Primary | ICD-10-CM

## 2024-05-29 DIAGNOSIS — M54.12 CERVICAL RADICULOPATHY: ICD-10-CM

## 2024-05-29 DIAGNOSIS — T70.20XD EFFECTS OF HIGH ALTITUDE, SUBSEQUENT ENCOUNTER: ICD-10-CM

## 2024-05-29 PROCEDURE — 1157F ADVNC CARE PLAN IN RCRD: CPT | Performed by: FAMILY MEDICINE

## 2024-05-29 PROCEDURE — G0439 PPPS, SUBSEQ VISIT: HCPCS | Performed by: FAMILY MEDICINE

## 2024-05-29 PROCEDURE — 1159F MED LIST DOCD IN RCRD: CPT | Performed by: FAMILY MEDICINE

## 2024-05-29 PROCEDURE — 1170F FXNL STATUS ASSESSED: CPT | Performed by: FAMILY MEDICINE

## 2024-05-29 RX ORDER — DEXAMETHASONE 4 MG/1
TABLET ORAL
Qty: 14 TABLET | Refills: 0 | Status: SHIPPED | OUTPATIENT
Start: 2024-05-29

## 2024-05-29 RX ORDER — HYDROCODONE BITARTRATE AND ACETAMINOPHEN 7.5; 325 MG/1; MG/1
1 TABLET ORAL 3 TIMES DAILY PRN
Qty: 20 TABLET | Refills: 0 | Status: SHIPPED | OUTPATIENT
Start: 2024-05-29

## 2024-05-29 RX ORDER — CYCLOBENZAPRINE HCL 10 MG
10 TABLET ORAL 2 TIMES DAILY PRN
Qty: 90 TABLET | Refills: 1 | Status: SHIPPED | OUTPATIENT
Start: 2024-05-29

## 2024-05-29 ASSESSMENT — ENCOUNTER SYMPTOMS
COUGH: 0
DIFFICULTY URINATING: 0
DIARRHEA: 0
COLOR CHANGE: 0
SEIZURES: 0
CONSTIPATION: 0
BLOOD IN STOOL: 0
PALPITATIONS: 0
NERVOUS/ANXIOUS: 0
SHORTNESS OF BREATH: 0
APPETITE CHANGE: 0
CONFUSION: 0
TROUBLE SWALLOWING: 0
JOINT SWELLING: 0
UNEXPECTED WEIGHT CHANGE: 0
HEMATURIA: 0
FEVER: 0

## 2024-05-29 ASSESSMENT — PATIENT HEALTH QUESTIONNAIRE - PHQ9
SUM OF ALL RESPONSES TO PHQ9 QUESTIONS 1 AND 2: 0
2. FEELING DOWN, DEPRESSED OR HOPELESS: NOT AT ALL
1. LITTLE INTEREST OR PLEASURE IN DOING THINGS: NOT AT ALL
SUM OF ALL RESPONSES TO PHQ9 QUESTIONS 1 AND 2: 0
2. FEELING DOWN, DEPRESSED OR HOPELESS: NOT AT ALL
1. LITTLE INTEREST OR PLEASURE IN DOING THINGS: NOT AT ALL

## 2024-05-29 ASSESSMENT — ACTIVITIES OF DAILY LIVING (ADL)
BATHING: INDEPENDENT
TAKING_MEDICATION: INDEPENDENT
MANAGING_FINANCES: INDEPENDENT
GROCERY_SHOPPING: INDEPENDENT
DRESSING: INDEPENDENT
DOING_HOUSEWORK: INDEPENDENT

## 2024-05-29 NOTE — PROGRESS NOTES
"Subjective   Patient ID: Kike Conde is a 68 y.o. male who presents for Medicare Annual Wellness Visit Subsequent (Wellness visit).    Cough  Pertinent negatives include no chest pain, fever or shortness of breath.      Pleasant 68-year-old  male presents today for Medicare wellness.  Overall patient has been feeling well.  He is exercising still quite a bit.  He is training for a number of hikes in the summer and fall.  He does use hydrocodone only when he is training and has increased amount of pain-this is not frequent.  Dexamethasone is only used for when he is on a hike for altitude sickness.  Otherwise he takes Ambien most nights tolerates it well.  Understands the risk associated with medicine.  BPH: States the Flomax is helping.  States that he has some times where he feels like he could use more.  But at this time feels that it helping and does not want to change  Review of Systems   Constitutional:  Negative for appetite change, fever and unexpected weight change.   HENT:  Negative for congestion and trouble swallowing.    Eyes:  Negative for visual disturbance.   Respiratory:  Negative for cough and shortness of breath.    Cardiovascular:  Negative for chest pain, palpitations and leg swelling.   Gastrointestinal:  Negative for blood in stool, constipation and diarrhea.   Genitourinary:  Negative for difficulty urinating and hematuria.   Musculoskeletal:  Negative for gait problem and joint swelling.   Skin:  Negative for color change.   Allergic/Immunologic: Negative for immunocompromised state.   Neurological:  Negative for seizures and syncope.   Psychiatric/Behavioral:  Negative for confusion and suicidal ideas. The patient is not nervous/anxious.        Objective   /82   Pulse 80   Ht 1.816 m (5' 11.5\")   Wt 76.2 kg (168 lb)   SpO2 98%   BMI 23.10 kg/m²     Physical Exam  Constitutional:       General: He is not in acute distress.     Appearance: Normal appearance. He is not " ill-appearing.   HENT:      Head: Normocephalic and atraumatic.      Right Ear: Tympanic membrane normal.      Left Ear: Tympanic membrane normal.      Nose: Nose normal.      Mouth/Throat:      Mouth: Mucous membranes are moist.   Eyes:      Pupils: Pupils are equal, round, and reactive to light.   Cardiovascular:      Rate and Rhythm: Normal rate and regular rhythm.      Heart sounds: No murmur heard.     No friction rub. No gallop.   Pulmonary:      Effort: Pulmonary effort is normal.      Breath sounds: Normal breath sounds.   Abdominal:      General: Abdomen is flat. There is no distension.      Palpations: Abdomen is soft.      Tenderness: There is no abdominal tenderness. There is no guarding.      Hernia: No hernia is present.   Musculoskeletal:         General: Normal range of motion.   Skin:     General: Skin is warm and dry.   Neurological:      General: No focal deficit present.      Mental Status: He is alert. Mental status is at baseline.      Cranial Nerves: No cranial nerve deficit.      Motor: No weakness.      Gait: Gait normal.   Psychiatric:         Mood and Affect: Mood normal.         Behavior: Behavior normal.         Thought Content: Thought content normal.         Judgment: Judgment normal.       Assessment/Plan   Problem List Items Addressed This Visit    None    Assessment:  -Alcohol use: approx 2-3 glasses of wine   -Daily THC use    -Hep C: Negative viral load     -Insomnia:   Ambien  Contract: 3/2024, UDS: 1/13/22, OARRS: each refill     -Right-sided lumbar radiculopathy:   5/21: +lumbar mild L4/5 spinal stenosis and L5/S1 carmencita stenosis- status post discectomy  Norco rarely when in a lot of pain     -tinnutitus: ent workup neg     -BPH: +urgency- mild symptoms   flomax     -Left sided cervical radiculopathy:   More numbness- no pain or weakness          - Elevated BP:        Nml at home        Holding on meds      -Left wrist pain:  Wants to wait on testing  Suspect CTS as well- EMG       -skin lesion on back:  Waiting until 12/24 to have removed    Health Maintenance Reminder:  -Medicare: 2025  -Preventative: Next visit  -Blood Work: 9/24  -PSA: 9/247  -Hep C: +  -Colonoscopy: 5/29  -Shingrix: completed  -Prevnar: completed   -Flu: Yearly gets a pharm

## 2024-06-06 ASSESSMENT — PATIENT HEALTH QUESTIONNAIRE - PHQ9
2. FEELING DOWN, DEPRESSED OR HOPELESS: NOT AT ALL
SUM OF ALL RESPONSES TO PHQ9 QUESTIONS 1 AND 2: 0
1. LITTLE INTEREST OR PLEASURE IN DOING THINGS: NOT AT ALL

## 2024-09-06 ENCOUNTER — OFFICE VISIT (OUTPATIENT)
Dept: PRIMARY CARE | Facility: CLINIC | Age: 69
End: 2024-09-06
Payer: MEDICARE

## 2024-09-06 VITALS — WEIGHT: 161.8 LBS | DIASTOLIC BLOOD PRESSURE: 84 MMHG | SYSTOLIC BLOOD PRESSURE: 160 MMHG | BODY MASS INDEX: 22.25 KG/M2

## 2024-09-06 DIAGNOSIS — R10.31 RLQ ABDOMINAL PAIN: Primary | ICD-10-CM

## 2024-09-06 PROCEDURE — 99213 OFFICE O/P EST LOW 20 MIN: CPT | Performed by: FAMILY MEDICINE

## 2024-09-06 PROCEDURE — 1157F ADVNC CARE PLAN IN RCRD: CPT | Performed by: FAMILY MEDICINE

## 2024-09-06 PROCEDURE — 1159F MED LIST DOCD IN RCRD: CPT | Performed by: FAMILY MEDICINE

## 2024-09-06 RX ORDER — TADALAFIL 5 MG/1
1 TABLET ORAL
COMMUNITY
Start: 2024-08-28

## 2024-09-06 ASSESSMENT — ENCOUNTER SYMPTOMS
CONSTIPATION: 0
HEMATURIA: 0
SEIZURES: 0
SHORTNESS OF BREATH: 0
NERVOUS/ANXIOUS: 0
CONFUSION: 0
TROUBLE SWALLOWING: 0
PALPITATIONS: 0
BLOOD IN STOOL: 0
APPETITE CHANGE: 0
DIARRHEA: 0
COLOR CHANGE: 0
COUGH: 0
DIFFICULTY URINATING: 0
ABDOMINAL PAIN: 1
FEVER: 0
UNEXPECTED WEIGHT CHANGE: 0
JOINT SWELLING: 0

## 2024-09-06 NOTE — PROGRESS NOTES
Subjective   Patient ID: Kike Conde is a 68 y.o. male who presents for Abdominal Pain (Had some RLQ discomfort beginning yesterday, rated a 5. States he was slightly bloated, took Dulcolax mid afternoon, today rated a 3. ).    Cough  Pertinent negatives include no chest pain, fever or shortness of breath.   Abdominal Pain  Pertinent negatives include no constipation, diarrhea, fever or hematuria.      Complains of right LQ pain: started yesterday while doing some work. Got bad but now almost gone. Took colace and improved but no constipation/diarrhea. But was having trouble going yesterday.   No urinary symptoms. No fever. No blood in stool  Review of Systems   Constitutional:  Negative for appetite change, fever and unexpected weight change.   HENT:  Negative for congestion and trouble swallowing.    Eyes:  Negative for visual disturbance.   Respiratory:  Negative for cough and shortness of breath.    Cardiovascular:  Negative for chest pain, palpitations and leg swelling.   Gastrointestinal:  Positive for abdominal pain. Negative for blood in stool, constipation and diarrhea.   Genitourinary:  Negative for difficulty urinating and hematuria.   Musculoskeletal:  Negative for gait problem and joint swelling.   Skin:  Negative for color change.   Allergic/Immunologic: Negative for immunocompromised state.   Neurological:  Negative for seizures and syncope.   Psychiatric/Behavioral:  Negative for confusion and suicidal ideas. The patient is not nervous/anxious.        Objective   /84 (BP Location: Left arm, Patient Position: Sitting, BP Cuff Size: Large adult)   Wt 73.4 kg (161 lb 12.8 oz)   BMI 22.25 kg/m²     Physical Exam  Constitutional:       General: He is not in acute distress.     Appearance: Normal appearance. He is not ill-appearing.   HENT:      Head: Normocephalic and atraumatic.      Right Ear: Tympanic membrane normal.      Left Ear: Tympanic membrane normal.      Nose: Nose normal.       Mouth/Throat:      Mouth: Mucous membranes are moist.   Eyes:      Pupils: Pupils are equal, round, and reactive to light.   Cardiovascular:      Rate and Rhythm: Normal rate and regular rhythm.      Heart sounds: No murmur heard.     No friction rub. No gallop.   Pulmonary:      Effort: Pulmonary effort is normal.      Breath sounds: Normal breath sounds.   Abdominal:      General: Abdomen is flat. There is no distension.      Palpations: Abdomen is soft.      Tenderness: There is abdominal tenderness. There is no guarding.      Hernia: No hernia is present.      Comments: Mild ttp LLQ no RT, no guard, jumped onto the exam table, neg rovsing, neg psoas sign      Musculoskeletal:         General: Normal range of motion.   Skin:     General: Skin is warm and dry.   Neurological:      General: No focal deficit present.      Mental Status: He is alert. Mental status is at baseline.      Cranial Nerves: No cranial nerve deficit.      Motor: No weakness.      Gait: Gait normal.   Psychiatric:         Mood and Affect: Mood normal.         Behavior: Behavior normal.         Thought Content: Thought content normal.         Judgment: Judgment normal.         Assessment/Plan   Problem List Items Addressed This Visit    None    Assessment:  -Alcohol use: approx 2-3 glasses of wine   -Daily THC use    -Hep C: Negative viral load     -Insomnia:   Ambien  Contract: 3/2024, UDS: 1/13/22, OARRS: each refill     -Right-sided lumbar radiculopathy:   5/21: +lumbar mild L4/5 spinal stenosis and L5/S1 carmencita stenosis- status post discectomy  Norco rarely when in a lot of pain     -tinnutitus: ent workup neg     -BPH: +urgency- mild symptoms   flomax     -Left sided cervical radiculopathy:   More numbness- no pain or weakness          - Elevated BP:        Nml at home        Holding on meds      -Left wrist pain:  Wants to wait on testing  Suspect CTS as well- EMG      -skin lesion on back:  Waiting until 12/24 to have removed    -Abdominal  pain:  No red flag signs or symptoms for appendicitis negative Rovsing and obturator and psoas sign and the pain has resolved.  Discussed following at this time    Health Maintenance Reminder:  -Medicare: 2025  -Preventative: Next visit  -Blood Work: 9/24  -PSA: 9/247  -Hep C: +  -Colonoscopy: 5/29  -Shingrix: completed  -Prevnar: completed   -Flu: Yearly gets a pharm

## 2024-09-26 ENCOUNTER — OFFICE VISIT (OUTPATIENT)
Dept: PRIMARY CARE | Facility: CLINIC | Age: 69
End: 2024-09-26
Payer: MEDICARE

## 2024-09-26 VITALS
HEART RATE: 80 BPM | OXYGEN SATURATION: 98 % | SYSTOLIC BLOOD PRESSURE: 144 MMHG | WEIGHT: 164.2 LBS | TEMPERATURE: 99 F | DIASTOLIC BLOOD PRESSURE: 86 MMHG | BODY MASS INDEX: 22.58 KG/M2

## 2024-09-26 DIAGNOSIS — R09.81 SINUS CONGESTION: Primary | ICD-10-CM

## 2024-09-26 PROCEDURE — 1159F MED LIST DOCD IN RCRD: CPT | Performed by: FAMILY MEDICINE

## 2024-09-26 PROCEDURE — 99213 OFFICE O/P EST LOW 20 MIN: CPT | Performed by: FAMILY MEDICINE

## 2024-09-26 PROCEDURE — 1157F ADVNC CARE PLAN IN RCRD: CPT | Performed by: FAMILY MEDICINE

## 2024-09-26 RX ORDER — AZITHROMYCIN 250 MG/1
TABLET, FILM COATED ORAL
Qty: 6 TABLET | Refills: 0 | Status: SHIPPED | OUTPATIENT
Start: 2024-09-26 | End: 2024-10-01

## 2024-09-26 ASSESSMENT — ENCOUNTER SYMPTOMS
SEIZURES: 0
FEVER: 0
ABDOMINAL PAIN: 0
BLOOD IN STOOL: 0
COLOR CHANGE: 0
HEMATURIA: 0
PALPITATIONS: 0
DIFFICULTY URINATING: 0
COUGH: 0
NERVOUS/ANXIOUS: 0
UNEXPECTED WEIGHT CHANGE: 0
DIARRHEA: 0
TROUBLE SWALLOWING: 0
APPETITE CHANGE: 0
JOINT SWELLING: 0
SHORTNESS OF BREATH: 0
SORE THROAT: 1
CONFUSION: 0
CONSTIPATION: 0

## 2024-09-26 NOTE — PROGRESS NOTES
Subjective   Patient ID: Kike Conde is a 69 y.o. male who presents for Cough (Sore throat in the am, trouble swallow food).    Cough  Associated symptoms include postnasal drip and a sore throat. Pertinent negatives include no chest pain, fever or shortness of breath.   Abdominal Pain  Pertinent negatives include no constipation, diarrhea, fever or hematuria.      Complains of ST, PND, sinus congestion, cough  Wife started with it w/ cough. Onset 14d ago. No sob. No fever. Some fatigue.   Review of Systems   Constitutional:  Negative for appetite change, fever and unexpected weight change.   HENT:  Positive for postnasal drip and sore throat. Negative for congestion and trouble swallowing.    Eyes:  Negative for visual disturbance.   Respiratory:  Negative for cough and shortness of breath.    Cardiovascular:  Negative for chest pain, palpitations and leg swelling.   Gastrointestinal:  Negative for abdominal pain, blood in stool, constipation and diarrhea.   Genitourinary:  Negative for difficulty urinating and hematuria.   Musculoskeletal:  Negative for gait problem and joint swelling.   Skin:  Negative for color change.   Allergic/Immunologic: Negative for immunocompromised state.   Neurological:  Negative for seizures and syncope.   Psychiatric/Behavioral:  Negative for confusion and suicidal ideas. The patient is not nervous/anxious.        Objective   /86   Pulse 80   Temp 37.2 °C (99 °F)   Wt 74.5 kg (164 lb 3.2 oz)   SpO2 98%   BMI 22.58 kg/m²     Physical Exam  Constitutional:       General: He is not in acute distress.     Appearance: Normal appearance. He is not ill-appearing.   HENT:      Head: Normocephalic and atraumatic.      Right Ear: Tympanic membrane normal.      Left Ear: Tympanic membrane normal.      Nose: Congestion present.      Mouth/Throat:      Mouth: Mucous membranes are moist.      Pharynx: Posterior oropharyngeal erythema present.   Eyes:      Pupils: Pupils are equal,  round, and reactive to light.   Cardiovascular:      Rate and Rhythm: Normal rate and regular rhythm.      Heart sounds: No murmur heard.     No friction rub. No gallop.   Pulmonary:      Effort: Pulmonary effort is normal.      Breath sounds: Normal breath sounds.   Abdominal:      General: Abdomen is flat. There is no distension.      Palpations: Abdomen is soft.      Tenderness: There is no abdominal tenderness. There is no guarding.      Hernia: No hernia is present.   Musculoskeletal:         General: Normal range of motion.   Skin:     General: Skin is warm and dry.   Neurological:      General: No focal deficit present.      Mental Status: He is alert. Mental status is at baseline.      Cranial Nerves: No cranial nerve deficit.      Motor: No weakness.      Gait: Gait normal.   Psychiatric:         Mood and Affect: Mood normal.         Behavior: Behavior normal.         Thought Content: Thought content normal.         Judgment: Judgment normal.         Assessment/Plan   Problem List Items Addressed This Visit    None  Visit Diagnoses         Codes    Sinus congestion    -  Primary R09.81    Relevant Medications    azithromycin (Zithromax) 250 mg tablet          Assessment:  -Alcohol use: approx 2-3 glasses of wine   -Daily THC use    -Hep C: Negative viral load     -Insomnia:   Ambien  Contract: 3/2024, UDS: 1/13/22, OARRS: each refill     -Right-sided lumbar radiculopathy:   5/21: +lumbar mild L4/5 spinal stenosis and L5/S1 carmencita stenosis- status post discectomy  Norco rarely when in a lot of pain     -tinnutitus: ent workup neg     -BPH: +urgency- mild symptoms   flomax     -Left sided cervical radiculopathy:   More numbness- no pain or weakness          - Elevated BP:        Nml at home        Holding on meds      -Left wrist pain:  Wants to wait on testing  Suspect CTS as well- EMG      -skin lesion on back:  Waiting until 12/24 to have removed    -Abdominal pain:  No red flag signs or symptoms for  appendicitis negative Rovsing and obturator and psoas sign and the pain has resolved.  Discussed following at this time    Health Maintenance Reminder:  -Medicare: 2025  -Preventative: Next visit  -Blood Work: 9/24  -PSA: 9/247  -Hep C: +  -Colonoscopy: 5/29  -Shingrix: completed  -Prevnar: completed   -Flu: Yearly gets a pharm

## 2024-10-10 DIAGNOSIS — F51.01 PRIMARY INSOMNIA: ICD-10-CM

## 2024-10-11 RX ORDER — ZOLPIDEM TARTRATE 10 MG/1
10 TABLET ORAL NIGHTLY PRN
Qty: 30 TABLET | Refills: 4 | Status: SHIPPED | OUTPATIENT
Start: 2024-10-11

## 2024-10-15 DIAGNOSIS — B37.0 THRUSH: Primary | ICD-10-CM

## 2024-10-15 RX ORDER — CLOTRIMAZOLE 10 MG/1
10 LOZENGE ORAL
Qty: 70 TABLET | Refills: 0 | Status: SHIPPED | OUTPATIENT
Start: 2024-10-15 | End: 2024-10-25

## 2024-12-09 DIAGNOSIS — R09.81 SINUS CONGESTION: Primary | ICD-10-CM

## 2024-12-09 RX ORDER — AZITHROMYCIN 250 MG/1
TABLET, FILM COATED ORAL
Qty: 6 TABLET | Refills: 0 | Status: SHIPPED | OUTPATIENT
Start: 2024-12-09 | End: 2024-12-13 | Stop reason: WASHOUT

## 2024-12-13 ENCOUNTER — APPOINTMENT (OUTPATIENT)
Dept: PRIMARY CARE | Facility: CLINIC | Age: 69
End: 2024-12-13
Payer: MEDICARE

## 2024-12-13 VITALS
DIASTOLIC BLOOD PRESSURE: 80 MMHG | SYSTOLIC BLOOD PRESSURE: 120 MMHG | WEIGHT: 164 LBS | OXYGEN SATURATION: 97 % | BODY MASS INDEX: 22.55 KG/M2 | HEART RATE: 85 BPM

## 2024-12-13 DIAGNOSIS — H61.23 BILATERAL IMPACTED CERUMEN: ICD-10-CM

## 2024-12-13 DIAGNOSIS — Z13.0 SCREENING FOR DEFICIENCY ANEMIA: ICD-10-CM

## 2024-12-13 DIAGNOSIS — Z13.220 LIPID SCREENING: ICD-10-CM

## 2024-12-13 DIAGNOSIS — Z13.1 DIABETES MELLITUS SCREENING: ICD-10-CM

## 2024-12-13 DIAGNOSIS — N40.1 BENIGN PROSTATIC HYPERPLASIA WITH LOWER URINARY TRACT SYMPTOMS, SYMPTOM DETAILS UNSPECIFIED: ICD-10-CM

## 2024-12-13 DIAGNOSIS — Z00.00 ROUTINE GENERAL MEDICAL EXAMINATION AT A HEALTH CARE FACILITY: Primary | ICD-10-CM

## 2024-12-13 DIAGNOSIS — R76.8 POSITIVE HEPATITIS C ANTIBODY TEST: ICD-10-CM

## 2024-12-13 DIAGNOSIS — Z12.5 PROSTATE CANCER SCREENING: ICD-10-CM

## 2024-12-13 LAB
ALBUMIN SERPL BCP-MCNC: 4.3 G/DL (ref 3.4–5)
ALP SERPL-CCNC: 64 U/L (ref 33–136)
ALT SERPL W P-5'-P-CCNC: 29 U/L (ref 10–52)
ANION GAP SERPL CALC-SCNC: 13 MMOL/L (ref 10–20)
AST SERPL W P-5'-P-CCNC: 38 U/L (ref 9–39)
BILIRUB SERPL-MCNC: 0.4 MG/DL (ref 0–1.2)
BUN SERPL-MCNC: 16 MG/DL (ref 6–23)
CALCIUM SERPL-MCNC: 9.3 MG/DL (ref 8.6–10.3)
CHLORIDE SERPL-SCNC: 101 MMOL/L (ref 98–107)
CHOLEST SERPL-MCNC: 169 MG/DL (ref 0–199)
CHOLESTEROL/HDL RATIO: 3.1
CO2 SERPL-SCNC: 30 MMOL/L (ref 21–32)
CREAT SERPL-MCNC: 0.78 MG/DL (ref 0.5–1.3)
EGFRCR SERPLBLD CKD-EPI 2021: >90 ML/MIN/1.73M*2
ERYTHROCYTE [DISTWIDTH] IN BLOOD BY AUTOMATED COUNT: 12.2 % (ref 11.5–14.5)
GLUCOSE SERPL-MCNC: 81 MG/DL (ref 74–99)
HCT VFR BLD AUTO: 43.8 % (ref 41–52)
HDLC SERPL-MCNC: 55.2 MG/DL
HGB BLD-MCNC: 14.5 G/DL (ref 13.5–17.5)
LDLC SERPL CALC-MCNC: 90 MG/DL
MCH RBC QN AUTO: 31.9 PG (ref 26–34)
MCHC RBC AUTO-ENTMCNC: 33.1 G/DL (ref 32–36)
MCV RBC AUTO: 96 FL (ref 80–100)
NON HDL CHOLESTEROL: 114 MG/DL (ref 0–149)
NRBC BLD-RTO: 0 /100 WBCS (ref 0–0)
PLATELET # BLD AUTO: 414 X10*3/UL (ref 150–450)
POTASSIUM SERPL-SCNC: 4.7 MMOL/L (ref 3.5–5.3)
PROT SERPL-MCNC: 6.7 G/DL (ref 6.4–8.2)
RBC # BLD AUTO: 4.55 X10*6/UL (ref 4.5–5.9)
SODIUM SERPL-SCNC: 139 MMOL/L (ref 136–145)
TRIGL SERPL-MCNC: 121 MG/DL (ref 0–149)
VLDL: 24 MG/DL (ref 0–40)
WBC # BLD AUTO: 8.8 X10*3/UL (ref 4.4–11.3)

## 2024-12-13 PROCEDURE — 85027 COMPLETE CBC AUTOMATED: CPT

## 2024-12-13 PROCEDURE — 80053 COMPREHEN METABOLIC PANEL: CPT

## 2024-12-13 PROCEDURE — 1124F ACP DISCUSS-NO DSCNMKR DOCD: CPT | Performed by: FAMILY MEDICINE

## 2024-12-13 PROCEDURE — 1159F MED LIST DOCD IN RCRD: CPT | Performed by: FAMILY MEDICINE

## 2024-12-13 PROCEDURE — 1036F TOBACCO NON-USER: CPT | Performed by: FAMILY MEDICINE

## 2024-12-13 PROCEDURE — G0103 PSA SCREENING: HCPCS

## 2024-12-13 PROCEDURE — 1157F ADVNC CARE PLAN IN RCRD: CPT | Performed by: FAMILY MEDICINE

## 2024-12-13 PROCEDURE — 99397 PER PM REEVAL EST PAT 65+ YR: CPT | Performed by: FAMILY MEDICINE

## 2024-12-13 PROCEDURE — 69210 REMOVE IMPACTED EAR WAX UNI: CPT | Performed by: FAMILY MEDICINE

## 2024-12-13 PROCEDURE — 80061 LIPID PANEL: CPT

## 2024-12-13 RX ORDER — TAMSULOSIN HYDROCHLORIDE 0.4 MG/1
0.8 CAPSULE ORAL NIGHTLY
Qty: 180 CAPSULE | Refills: 1 | Status: SHIPPED | OUTPATIENT
Start: 2024-12-13

## 2024-12-13 ASSESSMENT — ENCOUNTER SYMPTOMS
APPETITE CHANGE: 0
TROUBLE SWALLOWING: 0
SEIZURES: 0
FEVER: 0
BLOOD IN STOOL: 0
NERVOUS/ANXIOUS: 0
UNEXPECTED WEIGHT CHANGE: 0
DIARRHEA: 0
CONFUSION: 0
CONSTIPATION: 0
COLOR CHANGE: 0
PALPITATIONS: 0
SHORTNESS OF BREATH: 0
DIFFICULTY URINATING: 0
JOINT SWELLING: 0
HEMATURIA: 0

## 2024-12-13 NOTE — PROGRESS NOTES
"Subjective   Patient ID: Kike Conde is a 69 y.o. male who presents for Annual Exam (Yearly physical ).  HPI  Since last seen \"through out his back\" went to ER and had steroid injection.   Had some thrush after abx + injection  Start probiotics and felt better  Had another cold but didn't need medicine     Wants ears checked    HTN:  -controlled- keeps track of it at home  -works out a lot and good diet     Review of Systems   Constitutional:  Negative for appetite change, fever and unexpected weight change.   HENT:  Negative for congestion and trouble swallowing.    Eyes:  Negative for visual disturbance.   Respiratory:  Negative for shortness of breath.    Cardiovascular:  Negative for chest pain, palpitations and leg swelling.   Gastrointestinal:  Negative for blood in stool, constipation and diarrhea.   Genitourinary:  Negative for difficulty urinating and hematuria.   Musculoskeletal:  Negative for gait problem and joint swelling.   Skin:  Negative for color change.   Allergic/Immunologic: Negative for immunocompromised state.   Neurological:  Negative for seizures and syncope.   Psychiatric/Behavioral:  Negative for confusion and suicidal ideas. The patient is not nervous/anxious.        Objective   /80 (BP Location: Left arm)   Pulse 85   Wt 74.4 kg (164 lb)   SpO2 97%   BMI 22.55 kg/m²     Physical Exam  Constitutional:       General: He is not in acute distress.     Appearance: Normal appearance. He is not ill-appearing.   HENT:      Head: Normocephalic and atraumatic.      Right Ear: Tympanic membrane normal. There is impacted cerumen.      Left Ear: Tympanic membrane normal. There is impacted cerumen.      Nose: Nose normal.      Mouth/Throat:      Mouth: Mucous membranes are moist.   Eyes:      Pupils: Pupils are equal, round, and reactive to light.   Cardiovascular:      Rate and Rhythm: Normal rate and regular rhythm.      Heart sounds: No murmur heard.     No friction rub. No gallop. "   Pulmonary:      Effort: Pulmonary effort is normal.      Breath sounds: Normal breath sounds.   Abdominal:      General: Abdomen is flat. There is no distension.      Palpations: Abdomen is soft.      Tenderness: There is no abdominal tenderness. There is no guarding.      Hernia: No hernia is present.   Musculoskeletal:         General: Normal range of motion.   Skin:     General: Skin is warm and dry.   Neurological:      General: No focal deficit present.      Mental Status: He is alert. Mental status is at baseline.      Cranial Nerves: No cranial nerve deficit.      Motor: No weakness.      Gait: Gait normal.   Psychiatric:         Mood and Affect: Mood normal.         Behavior: Behavior normal.         Thought Content: Thought content normal.         Judgment: Judgment normal.       Procedure: Cerumen impaction removal  Cerumen removed by me in the office with curette no complications  Assessment/Plan   Problem List Items Addressed This Visit       BPH (benign prostatic hyperplasia)    Relevant Medications    tamsulosin (Flomax) 0.4 mg 24 hr capsule    Positive hepatitis C antibody test    Relevant Orders    CBC (Completed)    Comprehensive Metabolic Panel (Completed)    Hepatitis C RNA, Quantitative, PCR     Other Visit Diagnoses       Screening for deficiency anemia    -  Primary    Relevant Orders    CBC (Completed)    Diabetes mellitus screening        Relevant Orders    Comprehensive Metabolic Panel (Completed)    Lipid screening        Relevant Orders    Lipid Panel (Completed)    Prostate cancer screening        Relevant Orders    Prostate Specific Antigen, Screen (Completed)          Assessment:  -Alcohol use: approx 2-3 glasses of wine     -Daily THC use     -Hep C:   Negative viral load     -Insomnia:   Ambien  Contract: 3/2024, UDS: 1/13/22, OARRS: each refill     -Right-sided lumbar radiculopathy:   5/21: +lumbar mild L4/5 spinal stenosis and L5/S1 carmencita stenosis- status post discectomy  Norco  rarely when in a lot of pain     -tinnutitus: ent workup neg     -BPH: +urgency- mild symptoms   flomax      -Left sided cervical radiculopathy:   More numbness- no pain or weakness          - Elevated BP:        Nml at home        Holding on meds      -Left wrist pain:  Wants to wait on testing  Suspect CTS as well- EMG      -skin lesion on back:  Waiting until 12/24 to have removed     Health Maintenance Reminder:  -Medicare: 2025  -Preventative: 2025  -Blood Work: today  -PSA: today  -Hep C: +  -Colonoscopy: 5/29  -Shingrix: completed  -Prevnar: completed   -Flu: Yearly gets a pharm

## 2024-12-14 LAB — PSA SERPL-MCNC: 3.54 NG/ML

## 2024-12-16 ENCOUNTER — APPOINTMENT (OUTPATIENT)
Dept: DERMATOLOGY | Facility: CLINIC | Age: 69
End: 2024-12-16
Payer: MEDICARE

## 2024-12-16 DIAGNOSIS — D48.5 NEOPLASM OF UNCERTAIN BEHAVIOR OF SKIN: ICD-10-CM

## 2024-12-16 PROCEDURE — 12032 INTMD RPR S/A/T/EXT 2.6-7.5: CPT | Performed by: STUDENT IN AN ORGANIZED HEALTH CARE EDUCATION/TRAINING PROGRAM

## 2024-12-16 PROCEDURE — 11402 EXC TR-EXT B9+MARG 1.1-2 CM: CPT | Performed by: STUDENT IN AN ORGANIZED HEALTH CARE EDUCATION/TRAINING PROGRAM

## 2024-12-16 NOTE — PROGRESS NOTES
Excision Operative Note    Date of Surgery:  12/16/2024  Surgeon:  Keanu Dolan MD  Office Location:  [address] [facility]  [address]  [address]  [facility] [address]  Dept: [phone]  Dept Fax: [phone]  Referring Provider: Ramin Bangura, APRN-CNP  [address]  [address]  [place] [address]    [facility]   Kike Conde is a 69 y.o. male who presents for the following: Excision for neoplasm of uncertain behavior of skin (moderately dysplastic junctional nevus with increased architectural disorder) on the left upper back.     According to the patient, the lesion has been present for approximately greater than 1 year at the time of diagnosis.  The lesion is not causing symptoms.  The lesion has not been treated previously.    The patient does not have a pacemaker / defibrillator.  The patient does not have a heart valve / joint replacement.    The patient is not on blood thinners.   The patient does not have a history of hepatitis B or C.  The patient does not have a history of HIV.  The patient does not have a history of immunosuppression (e.g. organ transplantation, malignancy, medications)    Is it okay to leave a phone message with results? yes  Who else may we leave results with: (name, relationship) n/a    The following portions of the chart were reviewed this encounter and updated as appropriate:         Assessment/Plan   Pre-procedure:   Obtained informed consent: written from patient  The surgical site was identified and confirmed with the patient.     Intra-operative:   Audible time out called at : 3:31 PM 12/16/24  by: Vicenta See RN   Verified patient name, birthdate, site, specimen bottle label & requisition.    The planned procedure(s) was again reviewed with the patient. The risks of bleeding, infection, nerve damage and scarring were reviewed. The patient identity, surgical site, and planned procedure(s) were verified.     Biopsy Accession Number:  [id number]  Neoplasm of uncertain behavior of skin  Left Upper Back    Skin excision    Lesion length (cm):  1  Lesion width (cm):  1.2  Margin per side (cm):  0.3  Total excision diameter (cm):  1.8  Informed consent: discussed and consent obtained    Timeout: patient name, date of birth, surgical site, and procedure verified    Procedure prep:  Patient prepped in sterile fashion  Anesthesia: the lesion was anesthetized in a standard fashion    Local anesthetic: 1.5% lidocaine with epinephrine.  Instrument used: #15 blade    Hemostasis achieved with: electrodesiccation    Outcome: patient tolerated procedure well with no complications    Post-procedure details: sterile dressing applied and wound care instructions given    Dressing type: pressure dressing    Additional details:  That nature of the diagnosis was explained. The lesion is benign but has features concerning for the potential to progress to melanoma and complete excision is therefore recommended. We recommended that the patient have regular total body skin exams given increased risk of skin cancers.     Excision was recommended and discussed with the patient. The risks, benefits, and potential adverse effects were reviewed and the patient voiced understanding. Discussion included but was not limited to the cure rate, relative cost, wound care requirements, activity restrictions, and time to heal. Reconstruction options, risks, and benefits were reviewed including second intention healing, and linear repair (4-1 ratio was explained).  Possible outcomes were reviewed including likely scar appearance, infection, bleeding and the need for revision surgery.    Skin repair  Complexity:  Intermediate  Final length (cm):  3.5  Informed consent: discussed and consent obtained    Timeout: patient name, date of birth, surgical site, and procedure verified    Procedure prep:  Patient prepped in sterile fashion  Anesthesia: the lesion was anesthetized in a standard  fashion    Local anesthetic: 1.5% lidocaine with epi.  Reason for type of repair: reduce tension to allow closure    Undermining: edges undermined    Subcutaneous layers (deep stitches):   Suture size:  3-0  Suture type: Vicryl (polyglactin 910)    Stitches:  Buried vertical mattress  Fine/surface layer approximation (top stitches):   Suture size:  3-0  Suture type: Prolene (polypropylene)    Stitches: horizontal mattress and simple interrupted    Suture removal (days):  14  Hemostasis achieved with: electrodesiccation  Outcome: patient tolerated procedure well with no complications    Post-procedure details: sterile dressing applied and wound care instructions given    Dressing type: pressure dressing      Staff Communication: Dermatology Local Anesthesia: 1.5% Lidocaine with Epinephrine 1:200,000 - Amount: 6ml    Specimen 1 - Dermatopathology- DERM LAB  Differential Diagnosis: dysplastic junctional nevus ([id number])  Check Margins Yes/No?:  yes  Comments: 3 mm margins, indication stitch at superior tip  Dermpath Lab: Routine Histopathology (formalin-fixed tissue)      Intermediate Linear Repair:  Given the location and size of the defect, it was determined that an intermediate layered linear closure was required to restore normal anatomy and function. The repair is an intermediate closure as two layers of sutures were required. The defect was undermined extensively at the level of the subcutaneous plane. Standing cutaneous cones were removed using Burow's triangles. The wound edges were brought into close approximation with buried vertical mattress sutures. The remainder of the wound was then closed with epidermal top sutures.    The final repair measured 3.5 cm    Wound care was discussed, and the patient was given written post-operative wound care instructions.      The patient will follow up with Keanu Dolan MD in 2 weeks for suture removal, and will follow up with their primary dermatologist as scheduled.

## 2024-12-17 ENCOUNTER — TELEPHONE (OUTPATIENT)
Dept: DERMATOLOGY | Facility: CLINIC | Age: 69
End: 2024-12-17
Payer: MEDICARE

## 2024-12-17 NOTE — TELEPHONE ENCOUNTER
PAR staff reached out to patient to change scheduled appointment for S/R on 12/30 to an appointment in [place].  Patient declines to drive to [place] and asked if he can have his sutures removed at his next appointment on 1/6.  PAR staff let him know we would call back after discussing with Dr Dolan.    Dr Dolan made aware of issue with rescheduling patient for S/R.  Asked RN to reach out to patient's PCP to see if they are able to remove sutures.  Called PCP and they have 2 available appointments on 12/30.  Left message with patient to discuss plan for S/R at PCP's office; awaiting call back.    PAR staff reached out to patient again and he stated he did not receive the message; will reach out to his PCP to schedule the suture removal.

## 2024-12-18 LAB
LABORATORY COMMENT REPORT: NORMAL
PATH REPORT.FINAL DX SPEC: NORMAL
PATH REPORT.GROSS SPEC: NORMAL
PATH REPORT.MICROSCOPIC SPEC OTHER STN: NORMAL
PATH REPORT.RELEVANT HX SPEC: NORMAL
PATH REPORT.TOTAL CANCER: NORMAL

## 2024-12-30 ENCOUNTER — APPOINTMENT (OUTPATIENT)
Dept: PRIMARY CARE | Facility: CLINIC | Age: 69
End: 2024-12-30
Payer: MEDICARE

## 2024-12-30 ENCOUNTER — OFFICE VISIT (OUTPATIENT)
Dept: PRIMARY CARE | Facility: CLINIC | Age: 69
End: 2024-12-30
Payer: MEDICARE

## 2024-12-30 ENCOUNTER — APPOINTMENT (OUTPATIENT)
Dept: DERMATOLOGY | Facility: CLINIC | Age: 69
End: 2024-12-30
Payer: MEDICARE

## 2024-12-30 VITALS
SYSTOLIC BLOOD PRESSURE: 134 MMHG | HEART RATE: 68 BPM | BODY MASS INDEX: 23.1 KG/M2 | OXYGEN SATURATION: 98 % | DIASTOLIC BLOOD PRESSURE: 80 MMHG | WEIGHT: 168 LBS

## 2024-12-30 DIAGNOSIS — Z48.02 ENCOUNTER FOR REMOVAL OF SUTURES: Primary | ICD-10-CM

## 2024-12-30 PROCEDURE — 1036F TOBACCO NON-USER: CPT

## 2024-12-30 PROCEDURE — 1159F MED LIST DOCD IN RCRD: CPT

## 2024-12-30 PROCEDURE — 1160F RVW MEDS BY RX/DR IN RCRD: CPT

## 2024-12-30 PROCEDURE — 1157F ADVNC CARE PLAN IN RCRD: CPT

## 2024-12-30 PROCEDURE — S0630 REMOVAL OF SUTURES: HCPCS

## 2024-12-30 NOTE — PROGRESS NOTES
Suture Removal    Date/Time: 12/30/2024 9:09 AM    Performed by: Sumi Jovel PA-C  Authorized by: Sumi Jovel PA-C    Consent:     Consent obtained:  Verbal    Consent given by:  Patient    Risks, benefits, and alternatives were discussed: yes      Risks discussed:  Bleeding, pain and wound separation    Alternatives discussed:  Referral  [place] protocol:     Procedure explained and questions answered to patient or proxy's satisfaction: yes      Relevant documents present and verified: yes      Test results available: yes      Site/side marked: yes      Immediately prior to procedure, a time out was called: yes      Patient identity confirmed:  Verbally with patient, hospital-assigned identification number and provided demographic data  Location:     Location:  Trunk    Trunk location:  Back  Procedure details:     Wound appearance:  No signs of infection    Number of sutures removed:  7  Post-procedure details:     Post-removal:  No dressing applied    Procedure completion:  Tolerated well, no immediate complications    Had biopsy on 12/16 of lesion on back  7 sutures removed  Looks great - no s/sx of infection. 7 sutures removed.

## 2025-04-02 ENCOUNTER — TELEPHONE (OUTPATIENT)
Dept: PRIMARY CARE | Facility: CLINIC | Age: 70
End: 2025-04-02
Payer: MEDICARE

## 2025-04-02 NOTE — TELEPHONE ENCOUNTER
Wife called and is worried about his appt with Alf Stephen on 4/16/25, she thought Dr. Cristhian Stephen is a sports medicine not neuro
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
## 2025-04-03 DIAGNOSIS — S06.9XAA TRAUMATIC BRAIN INJURY, WITH UNKNOWN LOSS OF CONSCIOUSNESS STATUS, INITIAL ENCOUNTER (MULTI): ICD-10-CM

## 2025-04-04 DIAGNOSIS — S06.2X9S: Primary | ICD-10-CM

## 2025-04-07 ENCOUNTER — HOME HEALTH ADMISSION (OUTPATIENT)
Dept: HOME HEALTH SERVICES | Facility: HOME HEALTH | Age: 70
End: 2025-04-07
Payer: MEDICARE
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
## 2025-04-09 ENCOUNTER — DOCUMENTATION (OUTPATIENT)
Dept: HOME HEALTH SERVICES | Facility: HOME HEALTH | Age: 70
End: 2025-04-09
Payer: MEDICARE

## 2025-04-09 ENCOUNTER — APPOINTMENT (OUTPATIENT)
Dept: NEUROSURGERY | Facility: HOSPITAL | Age: 70
End: 2025-04-09
Payer: MEDICARE

## 2025-04-09 NOTE — HH CARE COORDINATION
Home Care received a Referral for Nursing, Physical Therapy, Occupational Therapy, Home Health Aide, and Speech Language Pathology. We have processed the referral for a Start of Care on 4.10.25.     If you have any questions or concerns, please feel free to contact us at [phone]. Follow the prompts, enter your five digit zip code, and you will be directed to your care team on [facility].

## 2025-04-10 ENCOUNTER — HOME CARE VISIT (OUTPATIENT)
Dept: HOME HEALTH SERVICES | Facility: HOME HEALTH | Age: 70
End: 2025-04-10
Payer: MEDICARE

## 2025-04-10 ENCOUNTER — APPOINTMENT (OUTPATIENT)
Dept: PRIMARY CARE | Facility: CLINIC | Age: 70
End: 2025-04-10
Payer: MEDICARE

## 2025-04-10 ENCOUNTER — APPOINTMENT (OUTPATIENT)
Dept: VASCULAR SURGERY | Facility: CLINIC | Age: 70
End: 2025-04-10
Payer: MEDICARE

## 2025-04-10 VITALS
SYSTOLIC BLOOD PRESSURE: 110 MMHG | OXYGEN SATURATION: 97 % | DIASTOLIC BLOOD PRESSURE: 66 MMHG | WEIGHT: 160 LBS | HEART RATE: 72 BPM | BODY MASS INDEX: 21.67 KG/M2 | HEIGHT: 72 IN | RESPIRATION RATE: 16 BRPM | TEMPERATURE: 98 F

## 2025-04-10 PROCEDURE — 1090000001 HH PPS REVENUE CREDIT

## 2025-04-10 PROCEDURE — 1090000002 HH PPS REVENUE DEBIT

## 2025-04-10 PROCEDURE — 1090000003 HH PPS REVENUE ADJ

## 2025-04-10 PROCEDURE — G0299 HHS/HOSPICE OF RN EA 15 MIN: HCPCS | Mod: HHH

## 2025-04-10 PROCEDURE — 0023 HH SOC

## 2025-04-10 PROCEDURE — 169592 NO-PAY CLAIM PROCEDURE

## 2025-04-10 ASSESSMENT — ACTIVITIES OF DAILY LIVING (ADL): ENTERING_EXITING_HOME: CONTACT GUARD ASSIST

## 2025-04-11 ENCOUNTER — APPOINTMENT (OUTPATIENT)
Dept: UROLOGY | Facility: CLINIC | Age: 70
End: 2025-04-11
Payer: MEDICARE

## 2025-04-11 ENCOUNTER — TELEMEDICINE (OUTPATIENT)
Dept: PRIMARY CARE | Facility: CLINIC | Age: 70
End: 2025-04-11
Payer: MEDICARE

## 2025-04-11 ENCOUNTER — APPOINTMENT (OUTPATIENT)
Dept: UROLOGY | Facility: HOSPITAL | Age: 70
End: 2025-04-11
Payer: MEDICARE
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
## 2025-04-11 PROCEDURE — 1090000001 HH PPS REVENUE CREDIT

## 2025-04-11 PROCEDURE — 1090000002 HH PPS REVENUE DEBIT

## 2025-04-11 NOTE — PROGRESS NOTES
Subjective   Patient ID: Kike Conde is a 69 y.o. male who presents for No chief complaint on file..  HPI  -Pt experienced a severe injury on 3/1/25 in [place] after colliding with another skier. He was taken to [facility] by air. Had LOC for unknown time and found to have grade 1 BRANDON.  He was intubated, extubated, and soon after re-intubated. By 3/11 was doing well on room air. On 3/12 went to OR for surgical fixation of facial fx. He was discharged 2 days ago from hospital.     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 Follow up with nsgy in [place].   - mandible fx- s/p MMF, jaw wired. NJ tube originally placed. Suspect needs to stay wired until about 6/12.   - Pain- on teltnol, lidoderm, oxy prn   - HTN- d/lauren most meds by discharge. 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 ASA daily for 3 months. F/u with vascular surgery.   - urination- had muniz on admission, failed voiding trial, hx of BPH. Doxasozin. Muniz replaced 3/29. 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 Mobic.   - dysphagia- malnutrition risk. Cleared for full liquid diet 3/18. Tolerating soft purees now.    PT- phenomenal progress at rehab center in [place] per wife. Pt was thrilled with progress. Has set up here.   OT- also great progress  Cognitive progress- amnesia and short term memory starting to improve. Wife feels like they need the most work in this area.   No agitation whatsoever.   Sleep- on seroquel and trazodone . Wife wants to start cutting them back   Constipation- last was the first night w some constipation. Trying to keep water up and gave 1 dulcolax last night.   Diet- on liquid diet. Getting enough nutrition per wife. Can syringe a bit if needed.    Oral surgeon - will try to get it scheduled   Uro- 4/15  Ortho surg- 4/16  PM&R- Tami  4/28   Vascular surgery- 5/1  Neurosurgery- scheduled w Anikt 5/5        Current Outpatient Medications:     acetaminophen 160 mg/5 mL (5 mL) suspension, 650 mg by nasogastric tube route every 6 hours if needed for mild pain (1 - 3). Indications: pain, Disp: , Rfl:     aspirin 81 mg chewable tablet, 81 mg by nasogastric tube route once daily. crush and give via gt  Indications: stroke prevention, Disp: , Rfl:     carvedilol (Coreg) 6.25 mg tablet, Take 6.25 mg by g-tube 2 times a day. given via ngt  Indications: high blood pressure, Disp: , Rfl:     chlorhexidine (Peridex) 0.12 % solution, Use 15 mL in the mouth or throat 2 times a day. swish and spit  Indications: mouth infection prevention, Disp: , Rfl:     cyclobenzaprine (Flexeril) 10 mg tablet, Take 1 tablet (10 mg) by mouth 2 times a day as needed for muscle spasms., Disp: 90 tablet, Rfl: 1    doxazosin (Cardura) 4 mg tablet, Take 4 mg by g-tube once daily at bedtime. given via ngt  Indications: enlarged prostate with urination problem, Disp: , Rfl:     lubricating eye drops (Refresh Classic, PF,) ophthalmic solution, Administer 1 drop into the right eye 4 times a day as needed for dry eyes. Indications: irritation, Disp: , Rfl:     meloxicam (Mobic) 15 mg tablet, Take 15 mg by g-tube once daily. given via ngt  Indications: joint damage causing pain and loss of function, Disp: , Rfl:     multivitamin with minerals (MULTIPLE VITAMIN-MINERALS ORAL), Take by mouth. CLARIFY DIRECTIONS, Disp: , Rfl:     omega-3 fatty acids-fish oil 300-1,000 mg capsule, Take by mouth., Disp: , Rfl:     oxyCODONE (Roxicodone) 5 mg/5 mL solution, 5 mg by nasogastric tube route 3 times a day as needed for severe pain (7 - 10). Indications: pain, Disp: , Rfl:     QUEtiapine (SEROquel) 50 mg tablet, Take 50 mg by g-tube once daily at bedtime. given via ngt  Indications: repeated episodes of anxiety, Disp: , Rfl:     tadalafil (Cialis) 5 mg tablet, Take 1 tablet (5 mg) by mouth early in  the morning.., Disp: , Rfl:     tamsulosin (Flomax) 0.4 mg 24 hr capsule, Take 2 capsules (0.8 mg) by mouth once daily at bedtime., Disp: 180 capsule, Rfl: 1    traZODone (Desyrel) 100 mg tablet, Take 100 mg by g-tube once daily at bedtime. given via ngt  Indications: insomnia associated with depression, Disp: , Rfl:     zolpidem (Ambien) 10 mg tablet, TAKE ONE TABLET BY MOUTH AT BEDTIME AS NEEDED for insomnia, Disp: 30 tablet, Rfl: 4   Past Surgical History:   Procedure Laterality Date    BACK SURGERY  04/18/2018    Back Surgery    HERNIA REPAIR  04/18/2018    Hernia Repair    OTHER SURGICAL HISTORY  03/28/2017    Hemilaminectomy With Disc Removal One Lumbar Interspace      Past Medical History:   Diagnosis Date    Adjustment insomnia 03/07/2016    Transient insomnia    Hemorrhage of anus and rectum 04/12/2019    BRBPR (bright red blood per rectum)    Impacted cerumen, right ear 03/07/2016    Impacted cerumen of right ear    Opioid abuse, in remission 03/12/2017    History of heroin abuse    Personal history of other diseases of the respiratory system 01/24/2018    History of influenza     Social History     Tobacco Use    Smoking status: Never    Smokeless tobacco: Never   Substance Use Topics    Alcohol use: Never    Drug use: Never      Family History   Problem Relation Name Age of Onset    Other (CREUTZFELDT LORNA DISEASE) Mother      Melanoma Other SIBLING       Review of Systems  10 point ROS negative except as otherwise noted in the HPI.      Objective   There were no vitals taken for this visit.   Physical Exam    Limited exam due to nature of virtual visit.      Assessment/Plan   Problem List Items Addressed This Visit       Injury due to skiing accident - Primary  - Pt had ski accident 3/1/25 in [place]. He had a long and complicated hospital stay but arrived back home to [place] this week. D/w his wife his status on a number of things today.   - has PT, OT, cognitive set up here. Currently coordinating being  able to have HHC and outpatient PT as this is not typically permitted.   - No agitation whatsoever. Was on seroquel and trazodone for sleep. Wife wants to try cutting them back now that she is home. Instructed to do one at a time.   - Constipation- last was the first night w some constipation. Trying to keep water up and gave 1 dulcolax last night. Encouraged to increase activity and fluids as tolerated and can use miralax as needed.  Diet- on liquid diet. Will speak with Genia from [facility] specialty services regarding getting him some of the isosource via pump/syringe.    Oral surgeon - has one scheduled, going to make sure it is the right doc if not has OMFS referral in  Uro- 4/15  Ortho surg- 4/16  PM&R- Tami 4/28   Vascular surgery- 5/1  Neurosurgery- scheduled w Ankit 5/5  Will work on getting neuro scheduled     Seeing me in office next week        Virtual or Telephone Consent    While technically available, the patient was unable or unwilling to consent to connect via audio/video telehealth technology; therefore, I performed this visit using a real-time audio only connection between Kike Conde & Sumi Jovel PA-C.  Verbal consent was requested and obtained from Kike Conde on this date, 04/11/25 for a telehealth visit and the patient's location was confirmed at the time of the visit.      Discussed at visit any disease processes that were of concern as well as the risks, benefits and instructions on any new medication provided. Patient (and/or caretaker of patient if present) stated all questions were answered, and they voiced understanding of instructions.     Sumi Jovel PA-C

## 2025-04-12 PROCEDURE — 1090000001 HH PPS REVENUE CREDIT

## 2025-04-12 PROCEDURE — 1090000002 HH PPS REVENUE DEBIT

## 2025-04-13 PROCEDURE — 1090000002 HH PPS REVENUE DEBIT

## 2025-04-13 PROCEDURE — 1090000001 HH PPS REVENUE CREDIT

## 2025-04-14 ENCOUNTER — HOME CARE VISIT (OUTPATIENT)
Dept: HOME HEALTH SERVICES | Facility: HOME HEALTH | Age: 70
End: 2025-04-14
Payer: MEDICARE
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
## 2025-04-14 PROCEDURE — 1090000002 HH PPS REVENUE DEBIT

## 2025-04-14 PROCEDURE — 1090000001 HH PPS REVENUE CREDIT

## 2025-04-14 ASSESSMENT — ENCOUNTER SYMPTOMS
EYE REDNESS: 1
DEPRESSION: 0
APPETITE LEVEL: GOOD
PAIN LOCATION: GENERALIZED
DESCRIPTION OF MEMORY LOSS: IMMEDIATE
CONSTIPATION: 1
LAST BOWEL MOVEMENT: 67302
CHANGE IN APPETITE: UNCHANGED
PAIN SEVERITY GOAL: 0/10
OCCASIONAL FEELINGS OF UNSTEADINESS: 1
HEADACHES: 1
STOOL FREQUENCY: LESS THAN DAILY
EYE WATERING: 1
LOSS OF SENSATION IN FEET: 0
HIGHEST PAIN SEVERITY IN PAST 24 HOURS: 5/10
PAIN: 1
FATIGUES EASILY: 1
FATIGUE: 1
PERSON REPORTING PAIN: PATIENT
DESCRIPTION OF MEMORY LOSS: SHORT TERM
FORGETFULNESS: 1
SUBJECTIVE PAIN PROGRESSION: RAPIDLY IMPROVING
MUSCLE WEAKNESS: 1
LOWEST PAIN SEVERITY IN PAST 24 HOURS: 0/10

## 2025-04-14 ASSESSMENT — ACTIVITIES OF DAILY LIVING (ADL)
PHYSICAL TRANSFERS ASSESSED: 1
OASIS_M1830: 03
CURRENT_FUNCTION: STAND BY ASSIST

## 2025-04-15 ENCOUNTER — APPOINTMENT (OUTPATIENT)
Dept: UROLOGY | Facility: CLINIC | Age: 70
End: 2025-04-15
Payer: MEDICARE

## 2025-04-15 VITALS
HEART RATE: 65 BPM | BODY MASS INDEX: 21.75 KG/M2 | HEIGHT: 72 IN | DIASTOLIC BLOOD PRESSURE: 72 MMHG | TEMPERATURE: 97.6 F | SYSTOLIC BLOOD PRESSURE: 99 MMHG | WEIGHT: 160.6 LBS

## 2025-04-15 DIAGNOSIS — R33.9 URINARY RETENTION: Primary | ICD-10-CM

## 2025-04-15 DIAGNOSIS — N13.8 BPH WITH OBSTRUCTION/LOWER URINARY TRACT SYMPTOMS: ICD-10-CM

## 2025-04-15 DIAGNOSIS — N40.1 BPH WITH OBSTRUCTION/LOWER URINARY TRACT SYMPTOMS: ICD-10-CM

## 2025-04-15 DIAGNOSIS — R31.0 HEMATURIA, GROSS: ICD-10-CM

## 2025-04-15 PROCEDURE — 1090000001 HH PPS REVENUE CREDIT

## 2025-04-15 PROCEDURE — 1036F TOBACCO NON-USER: CPT | Performed by: STUDENT IN AN ORGANIZED HEALTH CARE EDUCATION/TRAINING PROGRAM

## 2025-04-15 PROCEDURE — 1090000002 HH PPS REVENUE DEBIT

## 2025-04-15 PROCEDURE — 1160F RVW MEDS BY RX/DR IN RCRD: CPT | Performed by: STUDENT IN AN ORGANIZED HEALTH CARE EDUCATION/TRAINING PROGRAM

## 2025-04-15 PROCEDURE — 1159F MED LIST DOCD IN RCRD: CPT | Performed by: STUDENT IN AN ORGANIZED HEALTH CARE EDUCATION/TRAINING PROGRAM

## 2025-04-15 PROCEDURE — 1157F ADVNC CARE PLAN IN RCRD: CPT | Performed by: STUDENT IN AN ORGANIZED HEALTH CARE EDUCATION/TRAINING PROGRAM

## 2025-04-15 PROCEDURE — 3008F BODY MASS INDEX DOCD: CPT | Performed by: STUDENT IN AN ORGANIZED HEALTH CARE EDUCATION/TRAINING PROGRAM

## 2025-04-15 PROCEDURE — G2211 COMPLEX E/M VISIT ADD ON: HCPCS | Performed by: STUDENT IN AN ORGANIZED HEALTH CARE EDUCATION/TRAINING PROGRAM

## 2025-04-15 PROCEDURE — 99204 OFFICE O/P NEW MOD 45 MIN: CPT | Performed by: STUDENT IN AN ORGANIZED HEALTH CARE EDUCATION/TRAINING PROGRAM

## 2025-04-15 RX ORDER — COVID-19 VACCINE, MRNA 0.04 MG/.418ML
INJECTION, SUSPENSION INTRAMUSCULAR
COMMUNITY
Start: 2024-09-03

## 2025-04-15 RX ORDER — CEFAZOLIN SODIUM 2 G/100ML
2 INJECTION, SOLUTION INTRAVENOUS ONCE
OUTPATIENT
Start: 2025-04-15 | End: 2025-04-15

## 2025-04-15 RX ORDER — CHLORHEXIDINE GLUCONATE 40 MG/ML
SOLUTION TOPICAL DAILY PRN
OUTPATIENT
Start: 2025-04-15

## 2025-04-15 RX ORDER — INFLUENZA A VIRUS A/VICTORIA/4897/2022 IVR-238 (H1N1) ANTIGEN (FORMALDEHYDE INACTIVATED), INFLUENZA A VIRUS A/THAILAND/8/2022 IVR-237 (H3N2) ANTIGEN (FORMALDEHYDE INACTIVATED), INFLUENZA B VIRUS B/AUSTRIA/1359417/2021 BVR-26 ANTIGEN (FORMALDEHYDE INACTIVATED) 15; 15; 15 UG/.5ML; UG/.5ML; UG/.5ML
INJECTION, SUSPENSION INTRAMUSCULAR
COMMUNITY
Start: 2024-09-03

## 2025-04-15 NOTE — PROGRESS NOTES
Chief complaint:  prostate issues (Currently has catheter)  Referring physician:  No ref. provider found     SUBJECTIVE:  HPI:  Kike Conde is a 69 y.o. male with a history of TBI on ppx ASA 81mg who presents for initial evaluation of urinary retention and BPH.  Here with his wife who assists with history.    Skiing TBI early March in [place], protracted recovery since.  LUTS prior but no intervention, now in retention, failed void trial x2 even on alpha blocker and back to ambulatory near baseline.  One episode of mild gross hematuria related to exertion, otherwise draining well yellow.  Patient and wife eager to be rid of catheter.  Patient alert, oriented but long term memory loss.    Medical history:   has a past medical history of Adjustment insomnia (03/07/2016), Hemorrhage of anus and rectum (04/12/2019), Impacted cerumen, right ear (03/07/2016), Opioid abuse, in remission (03/12/2017), and Personal history of other diseases of the respiratory system (01/24/2018).   Surgical history:   has a past surgical history that includes Other surgical history (03/28/2017); Back surgery (04/18/2018); and Hernia repair (04/18/2018). Hernia repair with mesh, unsure laterality, no other abd surgeries  Family history:  family history includes CREUTZFELDT LORNA DISEASE in his mother; Melanoma in an other family member.  Social history:   reports that he has never smoked. He has never used smokeless tobacco. He reports that he does not drink alcohol and does not use drugs.    Medications:    Current Outpatient Medications   Medication Instructions    acetaminophen (TYLENOL) 650 mg, Every 6 hours PRN    aspirin 81 mg, Daily    carvedilol (COREG) 6.25 mg, 2 times daily    chlorhexidine (Peridex) 0.12 % solution 15 mL, 2 times daily    Comirnaty 2024-25, 12y up,,PF, 30 mcg/0.3 mL syringe     cyclobenzaprine (FLEXERIL) 10 mg, oral, 2 times daily PRN    doxazosin (CARDURA) 4 mg, Nightly    Fluad Triv 2024-25,65y up,,PF, 45 mcg (15  "mcg x 3)/0.5 mL syringe     lubricating eye drops (Refresh Classic, PF,) ophthalmic solution 1 drop, 4 times daily PRN    meloxicam (MOBIC) 15 mg, Daily    multivitamin with minerals (MULTIPLE VITAMIN-MINERALS ORAL) Take by mouth. CLARIFY DIRECTIONS    omega-3 fatty acids-fish oil 300-1,000 mg capsule Take by mouth.    oxyCODONE (ROXICODONE) 5 mg, 3 times daily PRN    QUEtiapine (SEROQUEL) 50 mg, Nightly    tadalafil (Cialis) 5 mg tablet 1 tablet, Daily (0630)    tamsulosin (FLOMAX) 0.8 mg, oral, Nightly    traZODone (DESYREL) 100 mg, Nightly    white petrolatum-mineral oiL 94-3 % ophthalmic ointment Apply a small amount (one-fourth inch) of ointment to the inside of the affected eyelids as needed for dryness or irritation.    zolpidem (AMBIEN) 10 mg, oral, Nightly PRN      Allergies:    No Known Allergies     ROS:  14-point review of systems negative except as noted above.    OBJECTIVE:  Visit Vitals  BP 99/72   Pulse 65   Temp 36.4 °C (97.6 °F)   Body mass index is 21.78 kg/m².    Physical exam  General:  No acute distress  HEENT:  EOMI, NJ in place capped, jaw with wire closure  CV:  Regular rate  Pulm:  Nonlabored respirations  Abd:  Soft, non-distended  :  No suprapubic or CVA tenderness  MSK:  No contractures  Neuro:  Motor intact  Psych:  Appropriate affect    Labs:    Lab Results   Component Value Date    WBC 8.8 12/13/2024    HGB 14.5 12/13/2024    HCT 43.8 12/13/2024     12/13/2024    ALT 29 12/13/2024    AST 38 12/13/2024     12/13/2024    K 4.7 12/13/2024     12/13/2024    CREATININE 0.78 12/13/2024    BUN 16 12/13/2024    CO2 30 12/13/2024   No results found for: \"URINECULTURE\" No results found for: \"PSA\"    Imaging:  All imaging discussed in HPI was independently reviewed.    ASSESSMENT:  Urinary retention  BPH    Discussed options for retention including:  - Continue Jarvis  - CIC  - Suprapubic catheter  - Rezum  - TURP  - Robotic simple prostatectomy    Need to work up for " prostate size and confirm visual obstruction.  Given recent failures x2 of tov and likely to fail again will defer void trial.  Extensively discussed risks of continued retention, infection, role of various surgical interventions and pre-op workup.    PLAN:  Continue doxazosin  CT pelvis to measure prostate size  Cysto 2-3 weeks with Jarvis exchange and ucx  Tentatively schedule TURP 5/19 at [facility] with 1 week post op void trial    Follow-up as above    Esdras Pena MD    Problem List Items Addressed This Visit       Urinary retention - Primary    Relevant Orders    Cystoscopy    CT pelvis wo IV contrast    Request for Pre-Admission Testing Visit    Case Request Operating Room: Resection Transurethral Prostate (Completed)    Coagulation Screen    ECG 12 lead    BPH with obstruction/lower urinary tract symptoms    Relevant Orders    Cystoscopy    CT pelvis wo IV contrast    Request for Pre-Admission Testing Visit    Case Request Operating Room: Resection Transurethral Prostate (Completed)    Coagulation Screen    ECG 12 lead     Other Visit Diagnoses       Hematuria, gross        Relevant Orders    Coagulation Screen

## 2025-04-16 ENCOUNTER — OFFICE VISIT (OUTPATIENT)
Dept: ORTHOPEDIC SURGERY | Facility: CLINIC | Age: 70
End: 2025-04-16
Payer: MEDICARE

## 2025-04-16 ENCOUNTER — HOME CARE VISIT (OUTPATIENT)
Dept: HOME HEALTH SERVICES | Facility: HOME HEALTH | Age: 70
End: 2025-04-16
Payer: MEDICARE

## 2025-04-16 VITALS — BODY MASS INDEX: 21.67 KG/M2 | HEIGHT: 72 IN | WEIGHT: 160 LBS

## 2025-04-16 DIAGNOSIS — M19.132 SCAPHOLUNATE ADVANCED COLLAPSE OF LEFT WRIST: Primary | ICD-10-CM

## 2025-04-16 DIAGNOSIS — M19.012 OSTEOARTHRITIS OF GLENOHUMERAL JOINT, LEFT: ICD-10-CM
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
## 2025-04-16 PROCEDURE — 99204 OFFICE O/P NEW MOD 45 MIN: CPT | Performed by: FAMILY MEDICINE

## 2025-04-16 PROCEDURE — 99214 OFFICE O/P EST MOD 30 MIN: CPT | Performed by: FAMILY MEDICINE

## 2025-04-16 PROCEDURE — 1090000002 HH PPS REVENUE DEBIT

## 2025-04-16 PROCEDURE — 1159F MED LIST DOCD IN RCRD: CPT | Performed by: FAMILY MEDICINE

## 2025-04-16 PROCEDURE — 1160F RVW MEDS BY RX/DR IN RCRD: CPT | Performed by: FAMILY MEDICINE

## 2025-04-16 PROCEDURE — 1036F TOBACCO NON-USER: CPT | Performed by: FAMILY MEDICINE

## 2025-04-16 PROCEDURE — 1157F ADVNC CARE PLAN IN RCRD: CPT | Performed by: FAMILY MEDICINE

## 2025-04-16 PROCEDURE — 3008F BODY MASS INDEX DOCD: CPT | Performed by: FAMILY MEDICINE

## 2025-04-16 PROCEDURE — 1090000001 HH PPS REVENUE CREDIT

## 2025-04-16 ASSESSMENT — ENCOUNTER SYMPTOMS
LOSS OF SENSATION IN FEET: 1
OCCASIONAL FEELINGS OF UNSTEADINESS: 0
DEPRESSION: 0

## 2025-04-16 NOTE — PROGRESS NOTES
History of Present Illness   Chief Complaint   Patient presents with    Left Wrist - Pain     PATIENT WAS IN A SKI ACCIDENT       The patient is 69 y.o. right-hand-dominant male  here with his wife a complaint of left wrist and left shoulder pain.  Patient was unfortunately in a skiing accident approximately 6 weeks ago, did suffer a TBI, was in [place] at time of injury, spent several weeks in rehab hospital in [place], he sustained a jaw fracture, has been dealing with memory issues but overall is improving, they are working on coordinating care upon his return back to [place].  With regards to his left shoulder and wrist, patient and wife provide history, patient does have history of some more chronic wrist discomfort, stiffness without any definitive previous injury that they can recall, wife did notice when he was doing planks, push-ups he would be using dumbbells to avoid wrist flexion, did wear a brace with workouts at times.  While in the hospital she says that he was complaining of worsening wrist pain, says that she thinks it was from pushing himself up in and out of the hospital bed, he did have some x-rays obtained there which are available for review, these showed advanced degenerative changes of the radiocarpal joint with widening of the scapholunate ligament and advanced scaphoid collapse.  He has been taking meloxicam which has provided good relief, says that he has minimal pain in his wrist currently, does some stiffness, is wearing a wrist brace    With regards to his left shoulder, symptoms are improving, was having some shoulder pain after the injury, he did have x-rays of the shoulder while in [place] at the hospital there, these showed moderate degenerative changes of the glenohumeral joint with high riding humeral head consistent with chronic rotator cuff arthropathy.  He denies any definitive shoulder injury in the past, was having some intermittent mild shoulder discomfort prior to the ski injury,  range of motion is getting better each day.    Patient is scheduled to start some occupational therapy and physical therapy.    Medical History[1]    Medication Documentation Review Audit       Reviewed by Adela Patterson MA (Medical Assistant) on 04/16/25 at 1128      Medication Order Taking? Sig Documenting Provider Last Dose Status   acetaminophen 160 mg/5 mL (5 mL) suspension [id number]  650 mg by nasogastric tube route every 6 hours if needed for mild pain (1 - 3). Indications: pain Historical Provider, MD  Active   aspirin 81 mg chewable tablet [id number]  1 tablet (81 mg) by nasogastric tube route once daily. crush and give via gt Historical Provider, MD  Active   carvedilol (Coreg) 6.25 mg tablet [id number]  Take 1 tablet (6.25 mg) by g-tube 2 times a day. given via ngt Historical Provider, MD  Active   chlorhexidine (Peridex) 0.12 % solution [id number]  Use 15 mL in the mouth or throat 2 times a day. swish and spit Historical Provider, MD  Active   Comirnaty 2024-25, 12y up,,PF, 30 mcg/0.3 mL syringe [id number]   Historical Provider, MD  Active   cyclobenzaprine (Flexeril) 10 mg tablet [id number]  Take 1 tablet (10 mg) by mouth 2 times a day as needed for muscle spasms.   Patient not taking: Reported on 4/15/2025    Clint FIGUEROA DO  Active   doxazosin (Cardura) 4 mg tablet [id number]  Take 1 tablet (4 mg) by g-tube once daily at bedtime. given via ngt Historical Provider, MD  Active   Fluad Triv 2024-25,65y up,,PF, 45 mcg (15 mcg x 3)/0.5 mL syringe [id number]   Historical Provider, MD  Active   lubricating eye drops (Refresh Classic, PF,) ophthalmic solution [id number]  Administer 1 drop into the right eye 4 times a day as needed for dry eyes. Historical Provider, MD  Active   meloxicam (Mobic) 15 mg tablet [id number]  Take 1 tablet (15 mg) by g-tube once daily. given via ngt Historical Provider, MD  Active   multivitamin with minerals (MULTIPLE VITAMIN-MINERALS ORAL) [id number]  Take by mouth. CLARIFY  DIRECTIONS Historical Provider, MD  Active   omega-3 fatty acids-fish oil 300-1,000 mg capsule [id number]  Take by mouth. Historical Provider, MD  Active   oxyCODONE (Roxicodone) 5 mg/5 mL solution [id number]  5 mL (5 mg) by nasogastric tube route 3 times a day as needed for severe pain (7 - 10). Historical Provider, MD  Active   QUEtiapine (SEROquel) 50 mg tablet [id number]  Take 1 tablet (50 mg) by g-tube once daily at bedtime. given via ngt Historical Provider, MD  Active   tadalafil (Cialis) 5 mg tablet [id number]  Take 1 tablet (5 mg) by mouth early in the morning..   Patient not taking: Reported on 4/15/2025    Historical Provider, MD  Active   tamsulosin (Flomax) 0.4 mg 24 hr capsule [id number]  Take 2 capsules (0.8 mg) by mouth once daily at bedtime.   Patient not taking: Reported on 4/15/2025    Clint FIGUEROA DO  Active   traZODone (Desyrel) 100 mg tablet [id number]  Take 1 tablet (100 mg) by g-tube once daily at bedtime. given via ngt Historical Provider, MD  Active   white petrolatum-mineral oiL 94-3 % ophthalmic ointment [id number]  Apply a small amount (one-fourth inch) of ointment to the inside of the affected eyelids as needed for dryness or irritation. Historical Provider, MD  Active   zolpidem (Ambien) 10 mg tablet [id number]  TAKE ONE TABLET BY MOUTH AT BEDTIME AS NEEDED for insomnia Clint FIGUEROA DO  Active                    RX Allergies[2]    Social History     Socioeconomic History    Marital status:      Spouse name: Not on file    Number of children: Not on file    Years of education: Not on file    Highest education level: Not on file   Occupational History    Not on file   Tobacco Use    Smoking status: Never    Smokeless tobacco: Never   Substance and Sexual Activity    Alcohol use: Never    Drug use: Never    Sexual activity: Not on file   Other Topics Concern    Not on file   Social History Narrative    Not on file     Social Drivers of Health     Financial Resource Strain: Not  on file   Food Insecurity: Not on file   Transportation Needs: No Transportation Needs (4/10/2025)    OASIS : Transportation     Lack of Transportation (Medical): No     Lack of Transportation (Non-Medical): No     Patient Unable or Declines to Respond: No   Physical Activity: Not on file   Stress: Not on file   Social Connections: Feeling Socially Integrated (4/10/2025)    OASIS : Social Isolation     Frequency of experiencing loneliness or isolation: Never   Intimate Partner Violence: Not on file   Housing Stability: Not on file       Surgical History[3]       Review of Systems   GENERAL: Negative  GI: Negative  MUSCULOSKELETAL: See HPI  SKIN: Negative  NEURO:  Negative     Physical Exam:    General/Constitutional: well appearing, no distress, appears stated age  HEENT: sclera clear  Respiratory: non labored breathing  Vascular: No edema, swelling or tenderness, except as noted in detailed exam.  Integumentary: No impressive skin lesions present, except as noted in detailed exam.  Neurological:  Alert and oriented   Psychological:  Normal mood and affect.  Musculoskeletal: Normal, except as noted in detailed exam and in HPI    Left wrist: Normal appearance, no swelling or skin changes.  There is minimal tenderness at the distal radius, radiocarpal joint or proximal carpus.  There is some limited range of motion at the wrist, flexion extension was limited to around 30 degrees, there is pain with attempted further passive range of motion.  There is some limited mobility with pronation and supination.  No motor deficits present at the wrist, no significant pain with strength testing.  At the hand there is no motor or sensory deficits in the median, ulnar, radial nerve distributions.    Left shoulder: Normal appearance, glenohumeral joint is reduced.  Slight decreased range of motion, forward flexion and abduction to around 150 degrees actively, full passive range of motion, there is no significant weakness  with rotator cuff strength testing in any direction, there is some pain with abduction and external rotation.  Negative impingement testing today.     Imaging: X-ray of left wrist from outside facility is available for review, there is advanced collapse of the scapholunate with scapholunate interval widening capitate migration, there is degenerative changes of the radiocarpal joint.    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 Osteoarthritis of glenohumeral joint, left        3. Injury due to skiing accident, initial encounter  Referral to Neurology            Plan:    Left wrist, this is more of a chronic issue, fortunately no acute injury with his ski accident.  Discussed with family that treatment can be conservative with occupational therapy, bracing for comfort but did encourage range of motion exercises out of the brace, he can continue the meloxicam, wean off meloxicam as tolerated by symptoms.  There is surgical intervention that could be done if he has worsening pain, function in the wrist, he could follow-up with Dr. Baker in the future if interested in more aggressive management.  He is scheduled to start some occupational therapy in the near future  Left shoulder glenohumeral joint osteoarthritis, chronic in nature, given ongoing issues from accident recommending initial conservative management, he will do occupational therapy similar for his wrist, we discussed consideration of possible glenohumeral joint injection in the future as well as at some point more definitive management shoulder replacement surgery if he has consistent symptoms despite conservative treatment.  Patient and wife in agreement with plan.  They will plan to follow-up as symptoms dictate.       [1]   Past Medical History:  Diagnosis Date     Adjustment insomnia 03/07/2016    Transient insomnia    Hemorrhage of anus and rectum 04/12/2019    BRBPR (bright red blood per rectum)    Impacted cerumen, right ear 03/07/2016    Impacted cerumen of right ear    Opioid abuse, in remission 03/12/2017    History of heroin abuse    Personal history of other diseases of the respiratory system 01/24/2018    History of influenza   [2] No Known Allergies  [3]   Past Surgical History:  Procedure Laterality Date    BACK SURGERY  04/18/2018    Back Surgery    HERNIA REPAIR  04/18/2018    Hernia Repair    OTHER SURGICAL HISTORY  03/28/2017    Hemilaminectomy With Disc Removal One Lumbar Interspace

## 2025-04-16 NOTE — LETTER
April 16, 2025     Sumi Jovel PA-C  [id number] [facility] [address]    Patient: Kike Conde   YOB: 1955   Date of Visit: 4/16/2025       Dear Dr. Sumi Jovel PA-C:    Thank you for referring Kike Conde to me for evaluation. Below are my notes for this consultation.  If you have questions, please do not hesitate to call me. I look forward to following your patient along with you.       Sincerely,     Cristhian Stephen MD      CC: No Recipients  ______________________________________________________________________________________      History of Present Illness   Chief Complaint   Patient presents with   • Left Wrist - Pain     PATIENT WAS IN A SKI ACCIDENT       The patient is 69 y.o. right-hand-dominant male  here with his wife a complaint of left wrist and left shoulder pain.  Patient was unfortunately in a skiing accident approximately 6 weeks ago, did suffer a TBI, was in [place] at time of injury, spent several weeks in rehab hospital in [place], he sustained a jaw fracture, has been dealing with memory issues but overall is improving, they are working on coordinating care upon his return back to [place].  With regards to his left shoulder and wrist, patient and wife provide history, patient does have history of some more chronic wrist discomfort, stiffness without any definitive previous injury that they can recall, wife did notice when he was doing planks, push-ups he would be using dumbbells to avoid wrist flexion, did wear a brace with workouts at times.  While in the hospital she says that he was complaining of worsening wrist pain, says that she thinks it was from pushing himself up in and out of the hospital bed, he did have some x-rays obtained there which are available for review, these showed advanced degenerative changes of the radiocarpal joint with widening of the scapholunate ligament and advanced scaphoid collapse.  He has been taking meloxicam which has provided good  relief, says that he has minimal pain in his wrist currently, does some stiffness, is wearing a wrist brace    With regards to his left shoulder, symptoms are improving, was having some shoulder pain after the injury, he did have x-rays of the shoulder while in [place] at the hospital there, these showed moderate degenerative changes of the glenohumeral joint with high riding humeral head consistent with chronic rotator cuff arthropathy.  He denies any definitive shoulder injury in the past, was having some intermittent mild shoulder discomfort prior to the ski injury, range of motion is getting better each day.    Patient is scheduled to start some occupational therapy and physical therapy.    Medical History[1]    Medication Documentation Review Audit       Reviewed by Adela Patterson MA (Medical Assistant) on 04/16/25 at 1128      Medication Order Taking? Sig Documenting Provider Last Dose Status   acetaminophen 160 mg/5 mL (5 mL) suspension [id number]  650 mg by nasogastric tube route every 6 hours if needed for mild pain (1 - 3). Indications: pain Historical Provider, MD  Active   aspirin 81 mg chewable tablet [id number]  1 tablet (81 mg) by nasogastric tube route once daily. crush and give via gt Historical Provider, MD  Active   carvedilol (Coreg) 6.25 mg tablet [id number]  Take 1 tablet (6.25 mg) by g-tube 2 times a day. given via ngt Historical Provider, MD  Active   chlorhexidine (Peridex) 0.12 % solution [id number]  Use 15 mL in the mouth or throat 2 times a day. swish and spit Historical Provider, MD  Active   Comirnaty 2024-25, 12y up,,PF, 30 mcg/0.3 mL syringe [id number]   Historical Provider, MD  Active   cyclobenzaprine (Flexeril) 10 mg tablet [id number]  Take 1 tablet (10 mg) by mouth 2 times a day as needed for muscle spasms.   Patient not taking: Reported on 4/15/2025    Clint FIGUEROA DO  Active   doxazosin (Cardura) 4 mg tablet [id number]  Take 1 tablet (4 mg) by g-tube once daily at bedtime. given  via ngt Historical Provider, MD  Active   Fluad Triv 2024-25,65y up,,PF, 45 mcg (15 mcg x 3)/0.5 mL syringe [id number]   Historical Provider, MD  Active   lubricating eye drops (Refresh Classic, PF,) ophthalmic solution [id number]  Administer 1 drop into the right eye 4 times a day as needed for dry eyes. Historical Provider, MD  Active   meloxicam (Mobic) 15 mg tablet [id number]  Take 1 tablet (15 mg) by g-tube once daily. given via ngt Historical Provider, MD  Active   multivitamin with minerals (MULTIPLE VITAMIN-MINERALS ORAL) [id number]  Take by mouth. CLARIFY DIRECTIONS Historical Provider, MD  Active   omega-3 fatty acids-fish oil 300-1,000 mg capsule [id number]  Take by mouth. Historical Provider, MD  Active   oxyCODONE (Roxicodone) 5 mg/5 mL solution [id number]  5 mL (5 mg) by nasogastric tube route 3 times a day as needed for severe pain (7 - 10). Historical Provider, MD  Active   QUEtiapine (SEROquel) 50 mg tablet [id number]  Take 1 tablet (50 mg) by g-tube once daily at bedtime. given via ngt Historical Provider, MD  Active   tadalafil (Cialis) 5 mg tablet [id number]  Take 1 tablet (5 mg) by mouth early in the morning..   Patient not taking: Reported on 4/15/2025    Historical Provider, MD  Active   tamsulosin (Flomax) 0.4 mg 24 hr capsule [id number]  Take 2 capsules (0.8 mg) by mouth once daily at bedtime.   Patient not taking: Reported on 4/15/2025    Clint FIGUEROA DO  Active   traZODone (Desyrel) 100 mg tablet [id number]  Take 1 tablet (100 mg) by g-tube once daily at bedtime. given via ngt Historical Provider, MD  Active   white petrolatum-mineral oiL 94-3 % ophthalmic ointment [id number]  Apply a small amount (one-fourth inch) of ointment to the inside of the affected eyelids as needed for dryness or irritation. Historical Provider, MD  Active   zolpidem (Ambien) 10 mg tablet [id number]  TAKE ONE TABLET BY MOUTH AT BEDTIME AS NEEDED for insomnia Clint FIGUEROA DO  Active                    RX  Allergies[2]    Social History     Socioeconomic History   • Marital status:      Spouse name: Not on file   • Number of children: Not on file   • Years of education: Not on file   • Highest education level: Not on file   Occupational History   • Not on file   Tobacco Use   • Smoking status: Never   • Smokeless tobacco: Never   Substance and Sexual Activity   • Alcohol use: Never   • Drug use: Never   • Sexual activity: Not on file   Other Topics Concern   • Not on file   Social History Narrative   • Not on file     Social Drivers of Health     Financial Resource Strain: Not on file   Food Insecurity: Not on file   Transportation Needs: No Transportation Needs (4/10/2025)    OASIS : Transportation    • Lack of Transportation (Medical): No    • Lack of Transportation (Non-Medical): No    • Patient Unable or Declines to Respond: No   Physical Activity: Not on file   Stress: Not on file   Social Connections: Feeling Socially Integrated (4/10/2025)    OASIS : Social Isolation    • Frequency of experiencing loneliness or isolation: Never   Intimate Partner Violence: Not on file   Housing Stability: Not on file       Surgical History[3]       Review of Systems   GENERAL: Negative  GI: Negative  MUSCULOSKELETAL: See HPI  SKIN: Negative  NEURO:  Negative     Physical Exam:    General/Constitutional: well appearing, no distress, appears stated age  HEENT: sclera clear  Respiratory: non labored breathing  Vascular: No edema, swelling or tenderness, except as noted in detailed exam.  Integumentary: No impressive skin lesions present, except as noted in detailed exam.  Neurological:  Alert and oriented   Psychological:  Normal mood and affect.  Musculoskeletal: Normal, except as noted in detailed exam and in HPI    Left wrist: Normal appearance, no swelling or skin changes.  There is minimal tenderness at the distal radius, radiocarpal joint or proximal carpus.  There is some limited range of motion at the  wrist, flexion extension was limited to around 30 degrees, there is pain with attempted further passive range of motion.  There is some limited mobility with pronation and supination.  No motor deficits present at the wrist, no significant pain with strength testing.  At the hand there is no motor or sensory deficits in the median, ulnar, radial nerve distributions.    Left shoulder: Normal appearance, glenohumeral joint is reduced.  Slight decreased range of motion, forward flexion and abduction to around 150 degrees actively, full passive range of motion, there is no significant weakness with rotator cuff strength testing in any direction, there is some pain with abduction and external rotation.  Negative impingement testing today.     Imaging: X-ray of left wrist from outside facility is available for review, there is advanced collapse of the scapholunate with scapholunate interval widening capitate migration, there is degenerative changes of the radiocarpal joint.    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 Osteoarthritis of glenohumeral joint, left        3. Injury due to skiing accident, initial encounter  Referral to Neurology            Plan:    Left wrist, this is more of a chronic issue, fortunately no acute injury with his ski accident.  Discussed with family that treatment can be conservative with occupational therapy, bracing for comfort but did encourage range of motion exercises out of the brace, he can continue the meloxicam, wean off meloxicam as tolerated by symptoms.  There is surgical intervention that could be done if he has worsening pain, function in the wrist, he could follow-up with Dr. Baker in the future if interested in more aggressive management.  He is scheduled to start some occupational  therapy in the near future  Left shoulder glenohumeral joint osteoarthritis, chronic in nature, given ongoing issues from accident recommending initial conservative management, he will do occupational therapy similar for his wrist, we discussed consideration of possible glenohumeral joint injection in the future as well as at some point more definitive management shoulder replacement surgery if he has consistent symptoms despite conservative treatment.  Patient and wife in agreement with plan.  They will plan to follow-up as symptoms dictate.       [1]  Past Medical History:  Diagnosis Date   • Adjustment insomnia 03/07/2016    Transient insomnia   • Hemorrhage of anus and rectum 04/12/2019    BRBPR (bright red blood per rectum)   • Impacted cerumen, right ear 03/07/2016    Impacted cerumen of right ear   • Opioid abuse, in remission 03/12/2017    History of heroin abuse   • Personal history of other diseases of the respiratory system 01/24/2018    History of influenza   [2]  No Known Allergies  [3]  Past Surgical History:  Procedure Laterality Date   • BACK SURGERY  04/18/2018    Back Surgery   • HERNIA REPAIR  04/18/2018    Hernia Repair   • OTHER SURGICAL HISTORY  03/28/2017    Hemilaminectomy With Disc Removal One Lumbar Interspace

## 2025-04-17 ENCOUNTER — APPOINTMENT (OUTPATIENT)
Dept: PRIMARY CARE | Facility: CLINIC | Age: 70
End: 2025-04-17
Payer: MEDICARE

## 2025-04-17 ENCOUNTER — OFFICE VISIT (OUTPATIENT)
Dept: PRIMARY CARE | Facility: CLINIC | Age: 70
End: 2025-04-17
Payer: MEDICARE

## 2025-04-17 ENCOUNTER — HOME CARE VISIT (OUTPATIENT)
Dept: HOME HEALTH SERVICES | Facility: HOME HEALTH | Age: 70
End: 2025-04-17
Payer: MEDICARE

## 2025-04-17 VITALS
WEIGHT: 160 LBS | BODY MASS INDEX: 21.7 KG/M2 | OXYGEN SATURATION: 98 % | HEART RATE: 64 BPM | SYSTOLIC BLOOD PRESSURE: 122 MMHG | DIASTOLIC BLOOD PRESSURE: 82 MMHG

## 2025-04-17 DIAGNOSIS — Z12.5 SCREENING FOR MALIGNANT NEOPLASM OF PROSTATE: ICD-10-CM

## 2025-04-17 DIAGNOSIS — Z13.0 SCREENING FOR DEFICIENCY ANEMIA: ICD-10-CM

## 2025-04-17 DIAGNOSIS — Z12.5 SCREENING FOR MALIGNANT NEOPLASM OF PROSTATE: Primary | ICD-10-CM

## 2025-04-17 DIAGNOSIS — Z96.0 URINARY CATHETER IN PLACE: ICD-10-CM

## 2025-04-17 DIAGNOSIS — Z13.1 SCREENING FOR DIABETES MELLITUS (DM): ICD-10-CM

## 2025-04-17 DIAGNOSIS — Z13.29 SCREENING FOR THYROID DISORDER: ICD-10-CM

## 2025-04-17 DIAGNOSIS — Z13.1 SCREENING FOR DIABETES MELLITUS: ICD-10-CM

## 2025-04-17 DIAGNOSIS — Z13.220 SCREENING FOR HYPERLIPIDEMIA: ICD-10-CM

## 2025-04-17 DIAGNOSIS — I10 HYPERTENSION, UNSPECIFIED TYPE: ICD-10-CM
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
## 2025-04-17 PROCEDURE — 1090000001 HH PPS REVENUE CREDIT

## 2025-04-17 PROCEDURE — 1090000002 HH PPS REVENUE DEBIT

## 2025-04-17 PROCEDURE — G0180 MD CERTIFICATION HHA PATIENT: HCPCS

## 2025-04-17 ASSESSMENT — PATIENT HEALTH QUESTIONNAIRE - PHQ9
1. LITTLE INTEREST OR PLEASURE IN DOING THINGS: NOT AT ALL
2. FEELING DOWN, DEPRESSED OR HOPELESS: NOT AT ALL
SUM OF ALL RESPONSES TO PHQ9 QUESTIONS 1 AND 2: 0

## 2025-04-17 NOTE — PROGRESS NOTES
Subjective   Patient ID: Kike Conde is a 69 y.o. male who presents for Med Refill (Getting a neurology referral  ).  HPI  To review:  -Pt experienced a severe injury on 3/1/25 in [place] after colliding with another skier. He was taken to [facility] by air. Had LOC for unknown time and found to have grade 1 BRANDON. He was intubated, extubated, and soon after re-intubated. By 3/11 was doing well on room air. On 3/12 went to OR for surgical fixation of facial fx. He was discharged 2 days ago from hospital.       Injuries/ongoing issues  - complaints today include some visuospatial issues. Has OT and eye doctor set up.   - TBI w LOC and grade 1 BRANDON- completed course of keppra, MRI C spine ok, cleared by neurosurgery. Follow up with nsgy in [place].   - mandible fx- s/p MMF, jaw wired. NJ tube originally placed. Suspect needs to stay wired until about 6/12.   - Pain- cutting back tylenol. Has not needed any oxy.  - HTN- d/lauren most meds by discharge. Left on carvedilol 6.25 mg BID. Looks great here and at home.  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 ASA daily for 3 months. F/u with vascular surgery.   - urination- had muniz on admission, failed voiding trial, hx of BPH. Doxasozin. Muniz replaced 3/29. Had uro f/u. Has CT pending 4/21 and surgery scheduled 5/19   - L wrist scapholunate collapse- saw ortho, significant OA. may get surgery in the future. Brace at night, off during day. Trying to cut back meloxicam  - stated heart was enlarged at hospital in [place]?  - also fractured nose -- no f/u set up per [facility]     Therapies/sleep/diet:  - HHC in place.  -PT- phenomenal progress at rehab center in [place] per wife. Pt was thrilled with progress. Has set up here. Considering outpatient PT.  -OT- also great progress, considering outpt OT  -Cognitive progress- amnesia and short term memory starting to improve. Wife feels like they need the most work in this area.    -Sleep- on seroquel and trazodone . Wife wants to start cutting them back   -Diet- getting everything through a straw and doing well with this     Appts and specialists:   -Uro- 4/15- ct and surg pending  -Ortho surg- 4/16- went great  -Dental- 4/22 Dr Lopez  -PM&R- Tami 4/28   -Vascular surgery- 5/1  -Neurosurgery- scheduled sanchez Pham 5/5  -Neuro- Nov 2025    Current Medications[1]   Surgical History[2]   Medical History[3]  Social History[4]   Family History[5]   Review of Systems  10 point ROS negative except as otherwise noted in the HPI.      Objective   /82   Pulse 64   Wt 72.6 kg (160 lb)   SpO2 98%   BMI 21.70 kg/m²    Physical Exam  Vitals reviewed.   Constitutional:       General: He is not in acute distress.  HENT:      Head: Normocephalic and atraumatic.      Nose:      Comments: NG tube in place     Mouth/Throat:      Comments: Jaw wired shut  Eyes:      Extraocular Movements: Extraocular movements intact.      Conjunctiva/sclera: Conjunctivae normal.      Pupils: Pupils are equal, round, and reactive to light.   Cardiovascular:      Rate and Rhythm: Normal rate and regular rhythm.      Pulses: Normal pulses.      Heart sounds: Normal heart sounds.   Pulmonary:      Effort: Pulmonary effort is normal.      Breath sounds: Normal breath sounds.   Genitourinary:     Comments: Urinary catheter in place and bag on L leg  Skin:     General: Skin is warm and dry.   Neurological:      Mental Status: He is alert.   Psychiatric:         Mood and Affect: Mood normal.         Behavior: Behavior normal.           Assessment/Plan   Problem List Items Addressed This Visit       Injury due to skiing accident - Primary  - Pt had ski accident 3/1/25 in [place]. He had a long and complicated hospital stay but arrived back home to [place] 4/10.   - has PT, OT, cognitive set up here. Currently coordinating being able to have HHC and outpatient PT as this is not typically permitted. May get outpatient OT/PT  -  discussed cutting meloxicam in half and using tylenol only PRN. Has not needed oxy   - try cutting seroquel in half and continuing trazodone.  - diet is going well-- drinking through straw. Has not needed parenteral nutrition   - overall his progress has been fantastic      Appts and specialists:   -Uro- 4/15- ct and surg pending  -Ortho surg- 4/16- went great  -Dental- 4/22 Dr Lopez  -PM&R- Tami 4/28   -Vascular surgery- 5/1  -Neurosurgery- scheduled sanchez Pham 5/5  -Neuro- Nov 2025- will work on getting this moved up          Discussed at visit any disease processes that were of concern as well as the risks, benefits and instructions on any new medication provided. Patient (and/or caretaker of patient if present) stated all questions were answered, and they voiced understanding of instructions.     Sumi Jovel PA-C          [1]   Current Outpatient Medications:     acetaminophen 160 mg/5 mL (5 mL) suspension, 650 mg by nasogastric tube route every 6 hours if needed for mild pain (1 - 3). Indications: pain, Disp: , Rfl:     aspirin 81 mg chewable tablet, 1 tablet (81 mg) by nasogastric tube route once daily. crush and give via gt, Disp: , Rfl:     carvedilol (Coreg) 6.25 mg tablet, Take 1 tablet (6.25 mg) by g-tube 2 times a day. given via ngt, Disp: , Rfl:     chlorhexidine (Peridex) 0.12 % solution, Use 15 mL in the mouth or throat 2 times a day. swish and spit, Disp: , Rfl:     Comirnaty 2024-25, 12y up,,PF, 30 mcg/0.3 mL syringe, , Disp: , Rfl:     doxazosin (Cardura) 4 mg tablet, Take 1 tablet (4 mg) by g-tube once daily at bedtime. given via ngt, Disp: , Rfl:     Fluad Triv 2024-25,65y up,,PF, 45 mcg (15 mcg x 3)/0.5 mL syringe, , Disp: , Rfl:     lubricating eye drops (Refresh Classic, PF,) ophthalmic solution, Administer 1 drop into the right eye 4 times a day as needed for dry eyes., Disp: , Rfl:     meloxicam (Mobic) 15 mg tablet, Take 1 tablet (15 mg) by g-tube once daily. given via ngt, Disp: ,  Rfl:     multivitamin with minerals (MULTIPLE VITAMIN-MINERALS ORAL), Take by mouth. CLARIFY DIRECTIONS, Disp: , Rfl:     omega-3 fatty acids-fish oil 300-1,000 mg capsule, Take by mouth., Disp: , Rfl:     oxyCODONE (Roxicodone) 5 mg/5 mL solution, 5 mL (5 mg) by nasogastric tube route 3 times a day as needed for severe pain (7 - 10)., Disp: , Rfl:     QUEtiapine (SEROquel) 50 mg tablet, Take 1 tablet (50 mg) by g-tube once daily at bedtime. given via ngt, Disp: , Rfl:     traZODone (Desyrel) 100 mg tablet, Take 1 tablet (100 mg) by g-tube once daily at bedtime. given via ngt, Disp: , Rfl:     white petrolatum-mineral oiL 94-3 % ophthalmic ointment, Apply a small amount (one-fourth inch) of ointment to the inside of the affected eyelids as needed for dryness or irritation., Disp: , Rfl:     zolpidem (Ambien) 10 mg tablet, TAKE ONE TABLET BY MOUTH AT BEDTIME AS NEEDED for insomnia, Disp: 30 tablet, Rfl: 4  [2]   Past Surgical History:  Procedure Laterality Date    BACK SURGERY  04/18/2018    Back Surgery    HERNIA REPAIR  04/18/2018    Hernia Repair    OTHER SURGICAL HISTORY  03/28/2017    Hemilaminectomy With Disc Removal One Lumbar Interspace   [3]   Past Medical History:  Diagnosis Date    Adjustment insomnia 03/07/2016    Transient insomnia    Hemorrhage of anus and rectum 04/12/2019    BRBPR (bright red blood per rectum)    Impacted cerumen, right ear 03/07/2016    Impacted cerumen of right ear    Opioid abuse, in remission 03/12/2017    History of heroin abuse    Personal history of other diseases of the respiratory system 01/24/2018    History of influenza   [4]   Social History  Tobacco Use    Smoking status: Never    Smokeless tobacco: Never   Vaping Use    Vaping status: Never Used   Substance Use Topics    Alcohol use: Never    Drug use: Never   [5]   Family History  Problem Relation Name Age of Onset    Other (CREUTZFELDT LORNA DISEASE) Mother      Melanoma Other S[place]LING

## 2025-04-18 ENCOUNTER — EVALUATION (OUTPATIENT)
Dept: SPEECH THERAPY | Facility: CLINIC | Age: 70
End: 2025-04-18
Payer: MEDICARE
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
## 2025-04-18 DIAGNOSIS — R41.841 COGNITIVE COMMUNICATION DEFICIT: ICD-10-CM

## 2025-04-18 LAB
ALBUMIN SERPL-MCNC: 4.1 G/DL (ref 3.6–5.1)
ALP SERPL-CCNC: 74 U/L (ref 35–144)
ALT SERPL-CCNC: 16 U/L (ref 9–46)
ANION GAP SERPL CALCULATED.4IONS-SCNC: 8 MMOL/L (CALC) (ref 7–17)
AST SERPL-CCNC: 16 U/L (ref 10–35)
BASOPHILS # BLD AUTO: 49 CELLS/UL (ref 0–200)
BASOPHILS NFR BLD AUTO: 0.7 %
BILIRUB SERPL-MCNC: 0.4 MG/DL (ref 0.2–1.2)
BUN SERPL-MCNC: 16 MG/DL (ref 7–25)
CALCIUM SERPL-MCNC: 9.5 MG/DL (ref 8.6–10.3)
CHLORIDE SERPL-SCNC: 100 MMOL/L (ref 98–110)
CO2 SERPL-SCNC: 30 MMOL/L (ref 20–32)
CREAT SERPL-MCNC: 0.71 MG/DL (ref 0.7–1.35)
EGFRCR SERPLBLD CKD-EPI 2021: 101 ML/MIN/1.73M2
EOSINOPHIL # BLD AUTO: 357 CELLS/UL (ref 15–500)
EOSINOPHIL NFR BLD AUTO: 5.1 %
ERYTHROCYTE [DISTWIDTH] IN BLOOD BY AUTOMATED COUNT: 12.5 % (ref 11–15)
GLUCOSE SERPL-MCNC: 98 MG/DL (ref 65–99)
HCT VFR BLD AUTO: 36.4 % (ref 38.5–50)
HGB BLD-MCNC: 12.1 G/DL (ref 13.2–17.1)
LYMPHOCYTES # BLD AUTO: 1764 CELLS/UL (ref 850–3900)
LYMPHOCYTES NFR BLD AUTO: 25.2 %
MCH RBC QN AUTO: 31.5 PG (ref 27–33)
MCHC RBC AUTO-ENTMCNC: 33.2 G/DL (ref 32–36)
MCV RBC AUTO: 94.8 FL (ref 80–100)
MONOCYTES # BLD AUTO: 679 CELLS/UL (ref 200–950)
MONOCYTES NFR BLD AUTO: 9.7 %
NEUTROPHILS # BLD AUTO: 4151 CELLS/UL (ref 1500–7800)
NEUTROPHILS NFR BLD AUTO: 59.3 %
PLATELET # BLD AUTO: 491 THOUSAND/UL (ref 140–400)
PMV BLD REES-ECKER: 8.7 FL (ref 7.5–12.5)
POTASSIUM SERPL-SCNC: 4.1 MMOL/L (ref 3.5–5.3)
PROT SERPL-MCNC: 6.7 G/DL (ref 6.1–8.1)
PSA SERPL-MCNC: 1.27 NG/ML
RBC # BLD AUTO: 3.84 MILLION/UL (ref 4.2–5.8)
SODIUM SERPL-SCNC: 138 MMOL/L (ref 135–146)
TSH SERPL-ACNC: 1.63 MIU/L (ref 0.4–4.5)
WBC # BLD AUTO: 7 THOUSAND/UL (ref 3.8–10.8)

## 2025-04-18 PROCEDURE — 92523 SPEECH SOUND LANG COMPREHEN: CPT | Mod: GN

## 2025-04-18 ASSESSMENT — PAIN SCALES - GENERAL: PAINLEVEL_OUTOF10: 0 - NO PAIN

## 2025-04-18 ASSESSMENT — ENCOUNTER SYMPTOMS
PAIN: 1
PAIN LOCATION - PAIN SEVERITY: 3/10
SUBJECTIVE PAIN PROGRESSION: GRADUALLY IMPROVING
DEPRESSION: 1
PERSON REPORTING PAIN: PATIENT
PAIN LOCATION: LEFT WRIST
OCCASIONAL FEELINGS OF UNSTEADINESS: 1
LOSS OF SENSATION IN FEET: 0

## 2025-04-18 ASSESSMENT — PATIENT HEALTH QUESTIONNAIRE - PHQ9
1. LITTLE INTEREST OR PLEASURE IN DOING THINGS: NOT AT ALL
10. IF YOU CHECKED OFF ANY PROBLEMS, HOW DIFFICULT HAVE THESE PROBLEMS MADE IT FOR YOU TO DO YOUR WORK, TAKE CARE OF THINGS AT HOME, OR GET ALONG WITH OTHER PEOPLE: SOMEWHAT DIFFICULT
2. FEELING DOWN, DEPRESSED OR HOPELESS: SEVERAL DAYS
SUM OF ALL RESPONSES TO PHQ9 QUESTIONS 1 AND 2: 1

## 2025-04-18 ASSESSMENT — ACTIVITIES OF DAILY LIVING (ADL)
HOME_HEALTH_OASIS: 01
OASIS_M1830: 01
AMBULATION ASSISTANCE: 1
AMBULATION ASSISTANCE: SUPERVISION

## 2025-04-18 ASSESSMENT — PAIN - FUNCTIONAL ASSESSMENT: PAIN_FUNCTIONAL_ASSESSMENT: 0-10

## 2025-04-21 ENCOUNTER — HOSPITAL ENCOUNTER (OUTPATIENT)
Dept: RADIOLOGY | Facility: HOSPITAL | Age: 70
Discharge: HOME | End: 2025-04-21
Payer: MEDICARE

## 2025-04-21 ENCOUNTER — TREATMENT (OUTPATIENT)
Dept: SPEECH THERAPY | Facility: CLINIC | Age: 70
End: 2025-04-21
Payer: MEDICARE
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
## 2025-04-21 DIAGNOSIS — N40.1 BPH WITH OBSTRUCTION/LOWER URINARY TRACT SYMPTOMS: ICD-10-CM

## 2025-04-21 DIAGNOSIS — R33.9 URINARY RETENTION: ICD-10-CM

## 2025-04-21 DIAGNOSIS — N13.8 BPH WITH OBSTRUCTION/LOWER URINARY TRACT SYMPTOMS: ICD-10-CM

## 2025-04-21 DIAGNOSIS — R41.841 COGNITIVE COMMUNICATION DEFICIT: ICD-10-CM

## 2025-04-21 PROCEDURE — 92507 TX SP LANG VOICE COMM INDIV: CPT | Mod: GN

## 2025-04-21 PROCEDURE — 72192 CT PELVIS W/O DYE: CPT

## 2025-04-21 PROCEDURE — 72192 CT PELVIS W/O DYE: CPT | Performed by: RADIOLOGY

## 2025-04-21 ASSESSMENT — PAIN - FUNCTIONAL ASSESSMENT: PAIN_FUNCTIONAL_ASSESSMENT: 0-10

## 2025-04-21 ASSESSMENT — PAIN SCALES - GENERAL: PAINLEVEL_OUTOF10: 0 - NO PAIN

## 2025-04-21 NOTE — PROGRESS NOTES
Speech-Language Pathology    SLP Adult Outpatient Speech-Language Pathology Treatment     Patient Name: Kike Conde  MRN: [id number]  Today's Date: 4/21/2025  Time Calculation  Start Time: 0800  Stop Time: 0905  Time Calculation (min): 65 min      Current Problem:        ICD-10-CM    Injury due to skiing accident V00.328A            Cognitive communication deficit R41.841       SLP Assessment:  SLP TX Intervention Outcome: Making Progress Towards Goals  Treatment Tolerance: Patient tolerated treatment well       Plan:  SLP TX Plan: Continue Plan of Care  SLP Frequency: 2x per week  Discussed POC: Patient  Discussed Risks/Benefits: Yes, Patient  Patient/Caregiver Agreeable: Yes      Subjective   Patient arrived to and attended treatment accompanied by his wife. Patient expressed frustration into new limitations, as he and his wife live a very active lifestyle. Patient and wife remained engaged throughout the session, despite moments of frustration during the [place] Naming Test.    General Visit Information:  Certification Period Start Date: 4/18/2025  Certification Period End Date: 7/17/2025  Number of Authorized Treatments : Based on [place] ([employer] MEDICARE, $40 CO PAY)  Total Number of Visits : 2    Pain Assessment:  Pain Assessment  Pain Assessment: 0-10  0-10 (Numeric) Pain Score: 0 - No pain      Objective   GOALS: Start date: 4/18/2025 ; End/re-eval date: 7/17/2025  By discharge:  LTG:   Patient will independently and effectively communicate in valued conversations in the home and social settings using compensatory strategies as needed.   >GOAL STATUS: Ongoing; progressing    Patient will increase functional independence to participate in the home, community, and work environment.   >GOAL STATUS: Ongoing; progressing    STGs:   Patient will perform word retrieval tasks for vocabulary/topics/tasks identified as personally-relevant to the patient with 90% accuracy given minimal cueing.  >ADDRESSED: Patient given  language/word-finding worksheets for HEP. [place] Naming Test adminstered today with the following results:   [place] Naming Test (BNT):  --Spontaneous correct responses: 45/60  --Stimulus cues given: 2  --Correct responses with stimulus cues: 0  --Phonemic cues give: 15  --Correct responses with phonemic cues: 13  --Multiple choice cues given: 4  --Correct responses with multiple choice cues: 4  *Patient noted to use gestures, delays, description, and related words independently to cue himself    >GOAL STATUS: Ongoing; progressing    Patient will demonstrate knowledge and use of >= 2 compensatory strategies for impaired word recall and will independently utilize them in 4/5 opportunities at the discourse level to prevent communication breakdowns.  >NOT ADDRESSED.  >GOAL STATUS: Ongoing    Patient will utilize compensatory strategies for memory in order to perform tasks relevant to the patient with 90% accuracy given minimal cueing.    >ADDRESSED: Patient asked about functional difficulties with memory today. Patient mentions there is a lapse of time around the general time-frame of the injury. He is still able to recall other past memories and is able to hold down new memories, although the period of time he cannot access is serving as a large stressor for him.   >GOAL STATUS: Ongoing; progressing    Patient will utilize compensatory strategies for executive functioning in order to perform tasks relevant to the patient with 90% accuracy given minimal cueing.    >ADDRESSED: Patient relies on wife for medication management and finances now. 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 He will continue to think of activities he would like to improve independence on throughout the week and report back with more functional tasks to address.  >GOAL STATUS: Ongoing; progressing    Patient will utilize learned fatigue/frustration management strategies independently.     >ADDRESSED: Patient asked about any current strategies he is using. Patient says he often just gets angry/overwhelmed and walks away.  >GOAL STATUS: Ongoing; progressing      Outpatient Education:  Adult Outpatient Education  Individual(s) Educated: Patient  Written Education : HEP re: language activities  Verbal Education : Feedback on session performance, treatment planning  Risk and Benefits Discussed with Patient/Caregiver/Other: yes  Patient/Caregiver Demonstrated Understanding: yes  Plan of Care Discussed and Agreed Upon: yes  Patient Response to Education: Patient/Caregiver Verbalized Understanding of Information, Patient/Caregiver Asked Appropriate Questions

## 2025-04-22 ENCOUNTER — EVALUATION (OUTPATIENT)
Dept: OCCUPATIONAL THERAPY | Facility: CLINIC | Age: 70
End: 2025-04-22
Payer: MEDICARE

## 2025-04-22 ENCOUNTER — EVALUATION (OUTPATIENT)
Dept: PHYSICAL THERAPY | Facility: CLINIC | Age: 70
End: 2025-04-22
Payer: MEDICARE

## 2025-04-22 DIAGNOSIS — M25.612 DECREASED ROM OF LEFT SHOULDER: Primary | ICD-10-CM
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
## 2025-04-22 DIAGNOSIS — R27.9 LACK OF COORDINATION: ICD-10-CM

## 2025-04-22 DIAGNOSIS — R26.89 IMPAIRED GAIT AND MOBILITY: ICD-10-CM

## 2025-04-22 DIAGNOSIS — R26.89 IMPAIRMENT OF BALANCE: Primary | ICD-10-CM

## 2025-04-22 DIAGNOSIS — R29.898 LUE WEAKNESS: ICD-10-CM

## 2025-04-22 DIAGNOSIS — M25.512 LEFT SHOULDER PAIN: ICD-10-CM

## 2025-04-22 PROCEDURE — 97165 OT EVAL LOW COMPLEX 30 MIN: CPT | Mod: GO

## 2025-04-22 PROCEDURE — 97162 PT EVAL MOD COMPLEX 30 MIN: CPT | Mod: GP | Performed by: PHYSICAL THERAPIST

## 2025-04-22 PROCEDURE — 97112 NEUROMUSCULAR REEDUCATION: CPT | Mod: GP | Performed by: PHYSICAL THERAPIST

## 2025-04-22 PROCEDURE — 97110 THERAPEUTIC EXERCISES: CPT | Mod: GO

## 2025-04-22 ASSESSMENT — PAIN SCALES - GENERAL
PAINLEVEL_OUTOF10: 0 - NO PAIN
PAINLEVEL_OUTOF10: 0 - NO PAIN

## 2025-04-22 ASSESSMENT — PAIN - FUNCTIONAL ASSESSMENT
PAIN_FUNCTIONAL_ASSESSMENT: 0-10
PAIN_FUNCTIONAL_ASSESSMENT: 0-10

## 2025-04-22 NOTE — PROGRESS NOTES
Occupational Therapy    Occupational Therapy Evaluation    Name: Kike Conde  MRN: [id number]  : 1955  Date: 25      Time Calculation  Start Time: 0800  Stop Time: 0900  Time Calculation (min): 60 min  OT Evaluation Time Entry  OT Evaluation (Low) Time Entry: 45     OT Therapeutic Procedures Time Entry  Therapeutic Exercise Time Entry: 15              Visit: 1  Richardtfrandy   25-25  Problem List Items Addressed This Visit           ICD-10-CM    Injury due to skiing accident V00.328A    Relevant Orders    Follow Up In Occupational Therapy    Left shoulder pain M25.512    Relevant Orders    Follow Up In Occupational Therapy    LUE weakness R29.898    Relevant Orders    Follow Up In Occupational Therapy    Decreased ROM of left shoulder - Primary M25.612    Relevant Orders    Follow Up In Occupational Therapy       Assessment:   Patient is a 69 year old male who was in a skiing accident on 3/1/25. Patient presents with LUE weakness, decreased L shoulder ROM and pain impacting patient's abilities to carry out daily tasks. Patient would benefit from skilled OT to address the above impairments to maximize patient's functional abilities.     Plan:   OT POC to include: neuro re-ed, manual therapy, therapeutic exercise, therapeutic activity, HEP    1x  a week for 6-8 weeks  Rehab potential: Good  Patient in agreement with plan     Subjective  Patient agreeable to OT evaluation. Accompanied by spouse. Reports is getting better with initiation of tasks including dressing. Patient has a walk in shower with a bench. Patient has set up/supervision with bathing.  Currently on liquid diet.    Patient is seeing Speech Therapy for cognition at this time.    Current Problem:  1. Decreased ROM of left shoulder  Follow Up In Occupational Therapy      2. Injury due to skiing accident, subsequent encounter  Referral to Occupational Therapy    Follow Up In Occupational Therapy      3. Left shoulder pain  Follow Up In  Occupational Therapy      4. LUE weakness  Follow Up In Occupational Therapy        General:      General  Reason for Referral: OT eval and treat  Referred By:  (Sumi Jovel PA-C)  Preferred Learning Style: verbal, visual, written  Per Sumi Jovel's note on 4/17/25:  HPI  To review:  “Pt experienced a severe injury on 3/1/25 in [place] after colliding with another skier. He was taken to [facility] by air. Had LOC for unknown time and found to have grade 1 BRANDON. He was intubated, extubated, and soon after re-intubated. By 3/11 was doing well on room air. On 3/12 went to OR for surgical fixation of facial fx. He was discharged 2 days ago from hospital.”    Per Dr. Stephen's note on 4/16/25:  (orthopedics)  “The patient is 69 y.o. right-hand-dominant male here with his wife a complaint of left wrist and left shoulder pain. Patient was unfortunately in a skiing accident approximately 6 weeks ago, did suffer a TBI, was in [place] at time of injury, spent several weeks in rehab hospital in [place], he sustained a jaw fracture, has been dealing with memory issues but overall is improving, they are working on coordinating care upon his return back to [place]. With regards to his left shoulder and wrist, patient and wife provide history, patient does have history of some more chronic wrist discomfort, stiffness without any definitive previous injury that they can recall, wife did notice when he was doing planks, push-ups he would be using dumbbells to avoid wrist flexion, did wear a brace with workouts at times. While in the hospital she says that he was complaining of worsening wrist pain, says that she thinks it was from pushing himself up in and out of the hospital bed, he did have some x-rays obtained there which are available for review, these showed advanced degenerative changes of the radiocarpal joint with widening of the scapholunate ligament and advanced scaphoid collapse. He has been taking meloxicam which has  provided good relief, says that he has minimal pain in his wrist currently, does some stiffness, is wearing a wrist brace     With regards to his left shoulder, symptoms are improving, was having some shoulder pain after the injury, he did have x-rays of the shoulder while in [place] at the hospital there, these showed moderate degenerative changes of the glenohumeral joint with high riding humeral head consistent with chronic rotator cuff arthropathy. He denies any definitive shoulder injury in the past, was having some intermittent mild shoulder discomfort prior to the ski injury, range of motion is getting better each day.  Plan:     Left wrist, this is more of a chronic issue, fortunately no acute injury with his ski accident. Discussed with family that treatment can be conservative with occupational therapy, bracing for comfort but did encourage range of motion exercises out of the brace, he can continue the meloxicam, wean off meloxicam as tolerated by symptoms. There is surgical intervention that could be done if he has worsening pain, function in the wrist, he could follow-up with Dr. Baker in the future if interested in more aggressive management. He is scheduled to start some occupational therapy in the near future  Left shoulder glenohumeral joint osteoarthritis, chronic in nature, given ongoing issues from accident recommending initial conservative management, he will do occupational therapy similar for his wrist, we discussed consideration of possible glenohumeral joint injection in the future as well as at some point more definitive management shoulder replacement surgery if he has consistent symptoms despite conservative treatment. Patient and wife in agreement with plan.”  Precautions:  Precautions  STEADI Fall Risk Score (The score of 4 or more indicates an increased risk of falling): 7  Vital Signs:   Not taken   Pain Assessment:  Pain Assessment  Pain Assessment: 0-10  0-10 (Numeric) Pain Score: 0  "- No pain  L shoulder pain 7/10 when lifting L shoulder over head  Work History:  Semi Retired/Retail  Prior Level of Function:  Independent   Roles/Routines:  Likes to work; gardening  Patient Goal:  \"Get back to myself\"  Personal Factors that my impact Care:   TBI    Objective   Neuro:  Observation:  Liquid diet  Jarvis   Walks without a device  R handed  Palpation:  L supraspinatus and infraspinatus atrophy   ROM  RUE:   Shoulder flexion: 0-165 degrees  External rotation: 80 degrees  WNL    LUE:   shoulder flexion: 0-95 due to pain  shoulder abduction: 0- 85  External rotation: 0-55  Internal rotation: T12  Will assess wrist next session due to time constraint    Strength:  RUE:   shoulder flexion:  5/5  shoulder abduction: 5/5   External rotation: 5 /5  Internal rotation:   5/5  Elbow flexion:    5/5  Wrist extension:   5/5  :  75#  LUE:   shoulder flexion:  3+/5  shoulder abduction: 3+/5   External rotation:  3+/5  Internal rotation:  4- /5  Elbow flexion:    4-/5  Wrist extension: 3+/5  :  10 #  Coordination:  Patient able to complete digit opposition with extra time  Sensation:  Reports chronic occasional parathesias in the ulnar distribution of the L hand  Tone:  None noted  Tremor   None noted   Outcome Measures:  OT Adult Other Outcome Measures  9 Hole Peg Test: R: 24 seconds   L: 45 seconds    OP EDUCATION:/Treatment   Patient educated on and completed pendulums to the L shoulder: forward and back, L<>R, clockwise/counter clockwise. Patient also educated on and completed shoulder flexion towel slides on table top. All exercises given in handout form.    Goals:  Active       OT Goals       HEP       Start:  04/22/25    Expected End:  06/21/25       Patient will be independent with established HEP for the LUE to maximize function for ADL/IADL tasks          ROM       Start:  04/22/25    Expected End:  07/01/25       Patient will demonstrate >= 130 degrees of L shoulder flexion for functional reaching " tasks and ADLS         Strength       Start:  04/22/25    Expected End:  07/01/25       Patient will increase L shoulder strength by 1/2 muscle grade and L  strength by 10# or more for ADL/IADL tasks

## 2025-04-22 NOTE — PROGRESS NOTES
"Physical Therapy    Physical Therapy Evaluation and Treatment    Patient Name: Kike Conde  MRN: [id number]  Today's Date: 4/24/2025  Time Calculation  Start Time: 0900  Stop Time: 1000  Time Calculation (min): 60 min  PT Evaluation Time Entry  PT Evaluation (Moderate) Time Entry: 35  PT Therapeutic Procedures Time Entry  Neuromuscular Re-Education Time Entry: 25          Visit Number: 1  Insurance: Payor: [employer]IC[employer] / Plan: [employer] MEDICARE ASSURE / Product Type: *No Product type* /   Plan of Care: 1-2x a week  Authorization: Medical necessity  Primary Diagnosis: Impaired gait and mobility, impairment of balance after TBI    Assessment:   Kike Conde  is a 69 y.o. old patient who participated in a physical therapy evaluation today due to Impairment of balance, mobility and cognition after suffering TBI while skiing in [place] on 3/1/2025. \"-Pt experienced a severe injury on 3/1/25 in [place] after colliding with another skier. He was taken to [facility] by air. Had LOC for unknown time and found to have grade 1 BRANDON. He was intubated, extubated, and soon after re-intubated. By 3/11 was doing well on room air. On 3/12 went to OR for surgical fixation of facial fx.\"  Patient with a past medical history of Lumbar surgery in 2018.  Patient presents in Outpatient Rehabilitation Clinic today with impaired LE and core coordination, impaired balance, visual function and executive function. Due to these impairments, he demonstrates mild gait deviations, increased fall risk, impaired stair negotiation. Patient barriers to rehab include: impaired executive function and impaired vision. Patient facilitators for rehab include: social and emotional support and significant progress since this devastating injury. Patient would benefit from skilled physical therapy services to improve above mentioned impairments, allow for independent and safe functional mobility, attain his therapy-related goals, and establish home program. " The patient and his spouse verbalized understanding and is in agreement with all goals and plan of care. Plan of care was developed with input and agreement by the patient.    Patient to follow up with OT and ST in this clinic, and neuro-ophthalmologist for appropriate diagnoses.    Plan:   OP PT Plan  Treatment/Interventions: Education/ Instruction, Gait training, Neuromuscular re-education, Therapeutic activities, Therapeutic exercises  PT Plan: Skilled PT  PT Frequency: 2 times per week  Duration: 8-10 (weeks)  Onset Date: 04/14/25  Certification Period Start Date: 04/22/25  Certification Period End Date: 07/21/25  Number of Treatments Authorized: MN  Rehab Potential: Excellent  Plan of Care Agreement: Patient    Current Problem:  Problem List Items Addressed This Visit           ICD-10-CM    Injury due to skiing accident V00.328A    Relevant Orders    Follow Up In Physical Therapy    Impaired gait and mobility R26.89    Impairment of balance - Primary R26.89    Lack of coordination R27.9         Subjective   Kike Conde  is a 69 y.o. male  presenting to the clinic with chief complaint of impaired balance and mobility after suffering a TBI while skiing.    -Falls:  2 falls since injury.    PRIOR LEVEL OF FUNCTION: Independent Living, Independent ADLs/IADLs, Independent Ambulation, (+) Driving, worked fulltime, frequent travel.  CURRENT LEVEL OF FUNCTION: 24 hour supervision for judgement and memory.  Ambulated without device, dresses and bathes with supervision/cues/set up.  Patient has a muniz catheter and liquid diet.  No driving.  CURRENT DME: none      SOCIAL HISTORY/LIVING ARRANGEMENT:   Patient lives with spouse in a multistory home.  Patient with second floor bedroom/bathroom     ACTIVITY/PARTICIPATION LIMITATIONS AND RESTRICTIONS: does not drive, requires supervision and cuing for memory and judgement    EXERCISE/ACTIVITY LEVEL: active lifestyle prior to injury.  Lots of therapy since  injury.    PATIENT GOAL: Regain strength and balance, address vision issues, think clearer.     General:     Payor: [facility] MEDICARE / Plan: [facility] MEDICARE ASSURE / Product Type: *No Product type* /   Sumi Jovel    Precautions  Precautions  STEADI Fall Risk Score (The score of 4 or more indicates an increased risk of falling): 7       Pain  Pain Assessment: 0-10  0-10 (Numeric) Pain Score: 0 - No pain    Objective     MUSCULOSKELETAL SYSTEM:  GROSS POSTURE:  stand/sitting: Erect posture, slightly forward head.  Wired jaw.  PASSIVE RANGE OF MOTION: WFL BL LE  LE STRENGTH - tested in sitting position:  Muscle Right LE Left LE   Hip Flexion (L1-L2) 5/5 5/5   Hip Extension 5/5 5/5   Hip Abduction 5/5 5/5   Hip Adduction 5/5 5/5   Knee Flexion (L3) 5/5 5/5   Knee Extension (L4) 5/5 5/5   Ankle Dorsiflexion (L5) 5/5 5/5   Ankle Plantarflexion (S1) 5/5 5/5     NEUROMUSCULAR SYSTEM:   OBSERVATION: no tremor or unusual posturing  GROSS SENSATION:  Intact to light touch R/L    TONE: WFL  COORDINATION:    HTS: slight L LE dysmetria when tested in sitting  PROPRIOCEPTION: Intact R/L at ankle  TRANSFERS:       SIT <> STAND:  Modified Independent   BALANCE:       4 Stage Balance Test:  Narrow Base of Support: 10 sec  Semi-Tandem: 10 sec  Tandem: 10 sec  SLS R: 10 sec  SLS L: 4 sec  GAIT:  Patient ambulated 30 feet with supervision and no device at. Patient demonstrated symmetrical and automatic gait pattern until perturbed with head turns, obstacles to navigate or cognitive task.  Slowed gait and distractible with challenges.   STAIRS: up/down 3 steps with 1 HR demonstrating reciprocal pattern.        Outcome Measures:  ABC - 930 (58%)  TUG - 9.8 sec  Cog TUG - 16.8 sec with cog errors. (Serial backwards 3s)  Tinetti Balance - 15/16  Tinetti Gait - 12/12 without device  MiniBest Test, Gait scale - 6/10    Treatments:  NEUROMUSCULAR REEDUCATION x 25 minutes  Provided manual facilitation for correct timing and accuracy of  postural responses as needed during functional mobility and assessment.   Patient entered and exited clinic ambulating with modified independence and no device 50' x 2.   Moderate time redirecting patient during conversation and during distracted motor tasks.  Initiated HEP:   - marching bridge with arms across chest - instructed to focuson pelvic stability and control while switching legs.      EDUCATION: see treatment section above for details. Patient verbalized and expressed good understanding of all information.  Educated patient on purpose and benefits of neurologic physical therapy for diagnosis along with results of examination findings and how this correlates to their chief complaint  Educated patient on purpose of exercises and importance of regular daily home program compliance    Educated patient on multi-factorial nature of balance systems to maintain balance    HOME EXERCISE PROGRAM:   - marching bridge with arms across chest - instructed to focuson pelvic stability and control while switching legs.    Goals:  Active       PT Problem       Motor control/coordination       Start:  04/24/25    Expected End:  07/21/25       Patient will demonstrate improved LE coordination as evidenced by symmetrical effortless heel to shin test in sitting and in supine to allow improved automatic motor control for more efficient functional mobility.    Patient will demonstrate independence and compliance on HEP with written and verbal cues as needed to maximize LE and core motor control.         Balance       Start:  04/24/25    Expected End:  07/21/25       Patient will demonstrate improved dynamic balance  as evidenced by symmetrical SLS of at least 20 seconds, and 12/12 on MiniBest Test balance scale to allow progression of safety and independence during higher level activities such as hiking on uneven terrain.    Patient will demonstrate improved confidence and insight into challenges as evidenced by ABC score of at  least 1120 (70%).         Gait       Start:  04/24/25    Expected End:  07/21/25       Patient will demonstrate improved gait in complex environments as evidenced by 10/10 on MiniBest Test gait scale, and <10% difference between TUG and Cog TUG to allow improved automaticity and cognitive endurance during crowded community based activities like shopping, eating out, and working out in gym.         Patient Stated Goal 1       Start:  04/24/25    Expected End:  07/21/25       Regain strength and balance, address vision issues, think clearer.  (80 - 90% recovery from injury - self rated)

## 2025-04-25 ENCOUNTER — TREATMENT (OUTPATIENT)
Dept: SPEECH THERAPY | Facility: CLINIC | Age: 70
End: 2025-04-25
Payer: MEDICARE
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
## 2025-04-25 DIAGNOSIS — H53.459 DECREASED PERIPHERAL VISION, UNSPECIFIED LATERALITY: Primary | ICD-10-CM

## 2025-04-25 DIAGNOSIS — R41.841 COGNITIVE COMMUNICATION DEFICIT: ICD-10-CM

## 2025-04-25 PROCEDURE — 92507 TX SP LANG VOICE COMM INDIV: CPT | Mod: GN

## 2025-04-25 ASSESSMENT — PAIN SCALES - GENERAL: PAINLEVEL_OUTOF10: 0 - NO PAIN

## 2025-04-25 ASSESSMENT — PAIN - FUNCTIONAL ASSESSMENT: PAIN_FUNCTIONAL_ASSESSMENT: 0-10

## 2025-04-25 NOTE — PROGRESS NOTES
Speech-Language Pathology    SLP Adult Outpatient Speech-Language Pathology Treatment     Patient Name: Kike Conde  MRN: [id number]  Today's Date: 4/25/2025  Time Calculation  Start Time: 0930  Stop Time: 1015  Time Calculation (min): 45 min     Current Problem:        ICD-10-CM    Injury due to skiing accident V00.328A            Cognitive communication deficit R41.841       SLP Assessment:  SLP TX Intervention Outcome: Making Progress Towards Goals  Treatment Tolerance: Patient tolerated treatment well       Plan:  SLP TX Plan: Continue Plan of Care  SLP Frequency: 2x per week  Discussed POC: Patient  Discussed Risks/Benefits: Yes, Patient  Patient/Caregiver Agreeable: Yes      Subjective   Patient arrived to and attended treatment accompanied by his friend Marcy. Patient appeared to be in a good mood, excited that this is the first time he has been able to come somewhere more independently, only being accompanied by a friend. He also had his jaw wires removed. Patient shared that he really enjoys jazz music, which we will try to incorporate for stress management.     General Visit Information:  Certification Period Start Date: 4/18/2025  Certification Period End Date: 7/17/2025  Number of Authorized Treatments : Based on [place] ([employer] MEDICARE, $40 CO PAY)  Total Number of Visits : 3    Pain Assessment:  Pain Assessment  Pain Assessment: 0-10  0-10 (Numeric) Pain Score: 0 - No pain      Objective     [place] Naming Test (BNT):  --Spontaneous correct responses: 45/60  --Stimulus cues given: 2  --Correct responses with stimulus cues: 0  --Phonemic cues give: 15  --Correct responses with phonemic cues: 13  --Multiple choice cues given: 4  --Correct responses with multiple choice cues: 4  *Patient noted to use gestures, delays, description, and related words independently to cue himself     GOALS: Start date: 4/18/2025 ; End/re-eval date: 7/17/2025  By discharge:  LTG:   Patient will independently and effectively  "communicate in valued conversations in the home and social settings using compensatory strategies as needed.   >GOAL STATUS: Ongoing; progressing    Patient will increase functional independence to participate in the home, community, and work environment.   >GOAL STATUS: Ongoing; progressing    STGs:   Patient will perform word retrieval tasks for vocabulary/topics/tasks identified as personally-relevant to the patient with 90% accuracy given minimal cueing.  >ADDRESSED: Patient brought back some worksheets given for HEP. He completed two pages, having the most difficulty with \"Opposites.\" Patient able to complete with moderate assistance, and continuation of HEP was encouraged. Semantic Feature Analysis was completed today using the [employer]. Patient required max assistance on describing how things look and moderate assistance on assigning the object to a category. For all other prompts, patient answered with min-0 assistance. Patient's descriptions became more thorough with less assistance as the activity progressed.   >GOAL STATUS: Ongoing; making progress  Patient will demonstrate knowledge and use of >= 2 compensatory strategies for impaired word recall and will independently utilize them in 4/5 opportunities at the discourse level to prevent communication breakdowns.  >ADDRESSED: Circumlocution briefly described today while completing SFA activity. Patient expressed understanding of the concept, citing how he would use it in conversation.   >GOAL STATUS: Ongoing; making progress  Patient will utilize compensatory strategies for memory in order to perform tasks relevant to the patient with 90% accuracy given minimal cueing.    >NOT ADDRESSED:   >GOAL STATUS: Ongoing; progressing    Patient will utilize compensatory strategies for executive functioning in order to perform tasks relevant to the patient with 90% accuracy given minimal cueing.    >NOT ADDRESSED:   >GOAL STATUS: Ongoing; " progressing    Patient will utilize learned fatigue/frustration management strategies independently.    >ADDRESSED: Patient asked about any current strategies he is using during the las session. Patient says he often just gets angry/overwhelmed and walks away. Today, patient added some tools into his toolbox for stress management. Patient was instructed on the Spoons Theory, and a handout was given for review at home. Patient was also sent a link of a list of calming/relaxation techniques, including breathing exercises, to begin using when frustrated or overwhelmed. Patient practiced Box Breathing on a guided video, and felt he could see places to implement it. Lastly, patient expressed enjoyment for jazz music. Clinician pointed out that music is a great way to reduce stress in a way that is active (patient noted that he does not like being still, he likes to be busy), as well as the added benefit of language stimulation.   >GOAL STATUS: Ongoing; making progress      Outpatient Education:  Adult Outpatient Education  Individual(s) Educated: Patient  Written Education : HEP re: language activities; Spoon Theory Handout, Breathing Exercise Link  Verbal Education : Feedback on session performance  Risk and Benefits Discussed with Patient/Caregiver/Other: yes  Patient/Caregiver Demonstrated Understanding: yes  Plan of Care Discussed and Agreed Upon: yes  Patient Response to Education: Patient/Caregiver Verbalized Understanding of Information, Patient/Caregiver Asked Appropriate Questions

## 2025-04-28 ENCOUNTER — CONSULT (OUTPATIENT)
Dept: PHYSICAL MEDICINE AND REHAB | Facility: CLINIC | Age: 70
End: 2025-04-28
Payer: MEDICARE

## 2025-04-28 ENCOUNTER — TREATMENT (OUTPATIENT)
Dept: PHYSICAL THERAPY | Facility: CLINIC | Age: 70
End: 2025-04-28
Payer: MEDICARE

## 2025-04-28 ENCOUNTER — TELEMEDICINE (OUTPATIENT)
Dept: PRIMARY CARE | Facility: CLINIC | Age: 70
End: 2025-04-28
Payer: MEDICARE

## 2025-04-28 VITALS
RESPIRATION RATE: 20 BRPM | HEART RATE: 73 BPM | TEMPERATURE: 98.2 F | SYSTOLIC BLOOD PRESSURE: 123 MMHG | DIASTOLIC BLOOD PRESSURE: 75 MMHG

## 2025-04-28 DIAGNOSIS — R26.89 IMPAIRMENT OF BALANCE: ICD-10-CM

## 2025-04-28 DIAGNOSIS — R41.841 COGNITIVE COMMUNICATION DEFICIT: ICD-10-CM

## 2025-04-28 DIAGNOSIS — R26.89 IMPAIRED GAIT AND MOBILITY: ICD-10-CM

## 2025-04-28 DIAGNOSIS — R27.9 LACK OF COORDINATION: ICD-10-CM

## 2025-04-28 DIAGNOSIS — R33.8 BENIGN PROSTATIC HYPERPLASIA WITH URINARY RETENTION: ICD-10-CM

## 2025-04-28 DIAGNOSIS — R26.89 IMPAIRMENT OF BALANCE: Primary | ICD-10-CM

## 2025-04-28 DIAGNOSIS — R05.9 COUGH, UNSPECIFIED TYPE: Primary | ICD-10-CM
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
## 2025-04-28 DIAGNOSIS — S06.2X9A: ICD-10-CM
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
## 2025-04-28 DIAGNOSIS — N40.1 BENIGN PROSTATIC HYPERPLASIA WITH URINARY RETENTION: ICD-10-CM

## 2025-04-28 DIAGNOSIS — S06.2X9A: Primary | ICD-10-CM

## 2025-04-28 PROCEDURE — 97112 NEUROMUSCULAR REEDUCATION: CPT | Mod: GP | Performed by: PHYSICAL THERAPIST

## 2025-04-28 PROCEDURE — 99212 OFFICE O/P EST SF 10 MIN: CPT

## 2025-04-28 PROCEDURE — 1036F TOBACCO NON-USER: CPT

## 2025-04-28 PROCEDURE — 99215 OFFICE O/P EST HI 40 MIN: CPT | Performed by: PHYSICAL MEDICINE & REHABILITATION

## 2025-04-28 PROCEDURE — 99205 OFFICE O/P NEW HI 60 MIN: CPT | Performed by: PHYSICAL MEDICINE & REHABILITATION

## 2025-04-28 PROCEDURE — 1159F MED LIST DOCD IN RCRD: CPT

## 2025-04-28 PROCEDURE — G2211 COMPLEX E/M VISIT ADD ON: HCPCS | Performed by: PHYSICAL MEDICINE & REHABILITATION

## 2025-04-28 PROCEDURE — 1160F RVW MEDS BY RX/DR IN RCRD: CPT

## 2025-04-28 PROCEDURE — 1157F ADVNC CARE PLAN IN RCRD: CPT

## 2025-04-28 RX ORDER — PREDNISONE 20 MG/1
40 TABLET ORAL DAILY
Qty: 10 TABLET | Refills: 0 | Status: SHIPPED | OUTPATIENT
Start: 2025-04-28 | End: 2025-05-03

## 2025-04-28 RX ORDER — AMANTADINE HYDROCHLORIDE 100 MG/1
100 CAPSULE, GELATIN COATED ORAL 2 TIMES DAILY
Qty: 60 CAPSULE | Refills: 0 | Status: SHIPPED | OUTPATIENT
Start: 2025-04-28 | End: 2025-05-28

## 2025-04-28 ASSESSMENT — PAIN SCALES - GENERAL: PAINLEVEL_OUTOF10: 0-NO PAIN

## 2025-04-28 NOTE — PROGRESS NOTES
Subjective   Patient ID: Kike Conde is a 69 y.o. male who presents for Cough.  HPI  - wife with cold like sx earlier last week   - friday he started with productive cough and cold sx  - lots of sputum and snot   - no sinus pain   - no blood in sputum -- was clear to light green   - no fevers, chills, body aches  - no n/v/d/c  - no sore throat  - no ear pain   - no sob, wheezing   - using cold and flu multi sx relief-- tylenol, dexomethorphan    Current Medications[1]   Surgical History[2]   Medical History[3]  Social History[4]   Family History[5]   Review of Systems  10 point ROS negative except as otherwise noted in the HPI.      Objective   There were no vitals taken for this visit.   Physical Exam    Limited exam due to nature of virtual visit.      Assessment/Plan   Problem List Items Addressed This Visit    None  Visit Diagnoses         Cough, unspecified type    -  Primary  - Pt with productive cough and congestion since friday.   - no fevers, chills  - taking tylenol cold/flu  - suspect viral given wife w similar sx   - recommend starting prednisone for the cough and message me if no improvement and will add abx. Continue supportive care, add mucinex.     Relevant Medications    predniSONE (Deltasone) 20 mg tablet            Virtual or Telephone Consent    While technically available, the patient was unable or unwilling to consent to connect via audio/video telehealth technology; therefore, I performed this visit using a real-time audio only connection between Kike Conde & Sumi Jovel PA-C.  Verbal consent was requested and obtained from Kike Conde on this date, 04/28/25 for a telehealth visit and the patient's location was confirmed at the time of the visit.    I discussed with the patient the potential benefits and risks of the use of telephone or video-conferencing that differ from in-person services (e.g., limits to patient confidentiality, limitations on the provider’s ability to observe  "the patient, limitations on the diagnostic tools available). I explained to the patient that I may determine at any point that telehealth services are not appropriate based on the patient’s circumstances and either party may therefore end the service to schedule an alternative in-person service or contact 911 to address a medical emergency. With the understanding of these risks, benefits and alternatives, the patient agreed to use the telephone or video-conferencing platform selected for this virtual session and further the patient confirmed his/her understanding that the services do not guarantee a specific outcome or recovery.  \"Spent 12 minutes with patient on phone discussing health concerns.\"    Discussed at visit any disease processes that were of concern as well as the risks, benefits and instructions on any new medication provided. Patient (and/or caretaker of patient if present) stated all questions were answered, and they voiced understanding of instructions.     Sumi Jovel PA-C     Patient was identified as a fall risk. Risk prevention instructions provided.       [1]   Current Outpatient Medications:     acetaminophen 160 mg/5 mL (5 mL) suspension, 650 mg by nasogastric tube route every 6 hours if needed for mild pain (1 - 3). Indications: pain, Disp: , Rfl:     amantadine (Symmetrel) 100 mg capsule, Take 1 capsule (100 mg) by mouth 2 times a day. Take 1 cap in the morning for a week and then twice a day (morning and lunch), Disp: 60 capsule, Rfl: 0    aspirin 81 mg chewable tablet, 1 tablet (81 mg) by nasogastric tube route once daily. crush and give via gt, Disp: , Rfl:     carvedilol (Coreg) 6.25 mg tablet, Take 1 tablet (6.25 mg) by g-tube 2 times a day. given via ngt, Disp: , Rfl:     chlorhexidine (Peridex) 0.12 % solution, Use 15 mL in the mouth or throat 2 times a day. swish and spit, Disp: , Rfl:     doxazosin (Cardura) 4 mg tablet, Take 1 tablet (4 mg) by g-tube once daily at bedtime. " given via ngt, Disp: , Rfl:     meloxicam (Mobic) 15 mg tablet, Take 1 tablet (15 mg) by g-tube once daily. given via ngt, Disp: , Rfl:     multivitamin with minerals (MULTIPLE VITAMIN-MINERALS ORAL), Take by mouth. CLARIFY DIRECTIONS, Disp: , Rfl:     omega-3 fatty acids-fish oil 300-1,000 mg capsule, Take by mouth., Disp: , Rfl:     predniSONE (Deltasone) 20 mg tablet, Take 2 tablets (40 mg) by mouth once daily for 5 days., Disp: 10 tablet, Rfl: 0    QUEtiapine (SEROquel) 50 mg tablet, Take 1 tablet (50 mg) by g-tube once daily at bedtime. given via ngt (Patient taking differently: Take 0.5 tablets (25 mg) by g-tube once daily at bedtime. given via ngt), Disp: , Rfl:     traZODone (Desyrel) 100 mg tablet, Take 1 tablet (100 mg) by g-tube once daily at bedtime. given via ngt, Disp: , Rfl:     zolpidem (Ambien) 10 mg tablet, TAKE ONE TABLET BY MOUTH AT BEDTIME AS NEEDED for insomnia, Disp: 30 tablet, Rfl: 4  [2]   Past Surgical History:  Procedure Laterality Date    BACK SURGERY  04/18/2018    Back Surgery    HERNIA REPAIR  04/18/2018    Hernia Repair    OTHER SURGICAL HISTORY  03/28/2017    Hemilaminectomy With Disc Removal One Lumbar Interspace   [3]   Past Medical History:  Diagnosis Date    Adjustment insomnia 03/07/2016    Transient insomnia    Hemorrhage of anus and rectum 04/12/2019    BRBPR (bright red blood per rectum)    Impacted cerumen, right ear 03/07/2016    Impacted cerumen of right ear    Opioid abuse, in remission 03/12/2017    History of heroin abuse    Personal history of other diseases of the respiratory system 01/24/2018    History of influenza   [4]   Social History  Tobacco Use    Smoking status: Never    Smokeless tobacco: Never   Vaping Use    Vaping status: Never Used   Substance Use Topics    Alcohol use: Yes     Alcohol/week: 3.0 standard drinks of alcohol     Types: 3 Glasses of wine per week     Comment: not since accident    Drug use: Not Currently     Types: Marijuana     Comment:  recrational rare   [5]   Family History  Problem Relation Name Age of Onset    Other (CREUTZFELDT LORNA DISEASE) Mother      Melanoma Other SIBLING

## 2025-04-28 NOTE — PROGRESS NOTES
Physical Therapy    Physical Therapy Treatment    Patient Name: Kike Conde  MRN: [id number]  Today's Date: 4/29/2025        PT Therapeutic Procedures Time Entry  Neuromuscular Re-Education Time Entry: 50       Visit number: 2  Insurance: Payor: [employer] / Plan: AET MEDICARE ASSURE / Product Type: *No Product type* /   Plan of Care Dates: 4/22/2025  Authorized visits: [place]  Primary diagnosis: Impairment of balance      Assessment:  Patient presents for first follow-up PT session this date. Patient tolerated all therapeutic activities well with min encouragement and cued concentration. Home exercises were assigned and reviewed with patient and spouse. Patient required min cues with review of home program exercises to ensure proper form and technique.  Patient reminded to work on good form and completion of task for a sense of success v comparing current abilities to pre-injury skills.  This is very hard for patient.  He will continue to benefit from skilled PT to continue to improve strength, balance, gait, and overall functional mobility.  Patient has had wiring of fractured jaw removed and he no longer has feeding tube through his nose.    Plan: Continue per plan of care to improve coordination and balance during progressively challenging standing and functional activities.       Current Problem  Problem List Items Addressed This Visit           ICD-10-CM    Injury due to skiing accident V00.328A    Impaired gait and mobility R26.89    Impairment of balance - Primary R26.89    Lack of coordination R27.9         Subjective    Patient states he feels like his vision has been affected by his head injury and that he has a tunnel of poor, fuzzy vision in the middle of visual field.  He has started to wear reading glasses all the time, as he feels they help him see.  No headaches or nausea.    Precautions:   Precautions  STEADI Fall Risk Score (The score of 4 or more indicates an increased risk of falling):  "7  Moderate falls risk due to distractibility and coordination deficits.  Continues with muniz catheter.    Pain  Pain Assessment  Pain Assessment: 0-10  0-10 (Numeric) Pain Score: 0 - No pain    Objective   Outcome Measures:  None assessed this date    Treatments:  NEUROMUSCULAR REEDUCATION x 50 minutes  Provided manual facilitation for correct timing and accuracy of postural responses as needed during functional mobility and assessment.   Patient entered and exited clinic ambulating with modified independence and no device 50' x 2.   Gait check   - 30' x 4   - walking lunge, 30' x 4 with occasional CGA   - high knee hesitation walk with frequent CGA - 30' x 4   - high knee side stepping - 30' x 2 each way   - increased difficulty accurately loading on R and adducting L        OP EDUCATION: Instructed patient via verbal cues, tactile cues, and demonstration for proper form and technique of exercises.  Educated patient regarding purpose of all activities.   Discussed criteria for \"successful\" motor responses and progression of challenge.   - Patient perseverates on pre/post morbid differences   - encouraged to compare motor control to other post injury abilities v premorbid abilities.    HOME EXERCISE PROGRAM:  - marching bridge with arms across chest - instructed to focus on pelvic stability and control while switching legs.    - walking lunge, high knee side stepping.    Goals:  Active       PT Problem       Motor control/coordination       Start:  04/24/25    Expected End:  07/21/25       Patient will demonstrate improved LE coordination as evidenced by symmetrical effortless heel to shin test in sitting and in supine to allow improved automatic motor control for more efficient functional mobility.    Patient will demonstrate independence and compliance on HEP with written and verbal cues as needed to maximize LE and core motor control.         Balance       Start:  04/24/25    Expected End:  07/21/25       " Patient will demonstrate improved dynamic balance  as evidenced by symmetrical SLS of at least 20 seconds, and 12/12 on MiniBest Test balance scale to allow progression of safety and independence during higher level activities such as hiking on uneven terrain.    Patient will demonstrate improved confidence and insight into challenges as evidenced by ABC score of at least 1120 (70%).         Gait       Start:  04/24/25    Expected End:  07/21/25       Patient will demonstrate improved gait in complex environments as evidenced by 10/10 on MiniBest Test gait scale, and <10% difference between TUG and Cog TUG to allow improved automaticity and cognitive endurance during crowded community based activities like shopping, eating out, and working out in gym.         Patient Stated Goal 1       Start:  04/24/25    Expected End:  07/21/25       Regain strength and balance, address vision issues, think clearer.  (80 - 90% recovery from injury - self rated)

## 2025-04-28 NOTE — PROGRESS NOTES
Physical Medicine and Rehabilitation  Consultation   04/28/25    Chief Complaint:  Neck Pain     HPI:  Kike Conde is a 69 y.o. old R handed male who presents to the clinic today at the request of neuro for evaluation of neck pain.    Has a history of grade I BRANDON injury on 3/1 in [place] when skiing. He was initially seen at [facility], presumably might have collided with another skier. Had LOC.   Per record;   Edgar Conde is a 69-year-old male who admitted to [facility] by air on 3/1/25 after he collided with another skier. He had a loss of consciousness. 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     Admitted to acute rehab at [facility] from 3.17-4/9. Came back to [place] 4/10.  Started outpatient therapies, speech, OT and PT at [facility].      Per his wife his physical state is doing well. He is a downhill skiier and likely was hit from before.   States his biggest issue is his vision, feels he has double vision which is main shaneka, perception, states regular reading glasses and feels some things are better.  Went to optometrist at [facility] and was found he needs two different treatments.     States covering one is better for double vision.      He still has a muniz, failed trial wo muniz. He was started on Cardura, has a ho of BPH.    No issues w bowel.  Working w therapy on balance. Had a fall didn't pay attention.     States that left arm shoulder rom is an issue, was prior to injury now worse. States main issues are memory and coordination.    Feels overall frustrated. Has some days that he gets down.    Sleeping is  better w seroquel. States sleeping is good seroquel 25 mg and trazadone 100 mg.     He is now on a liquid diet due to jaw surgery.     Difficulty w concentration        Medical History[1]     Surgical History[2]     Patient Active Problem List    Diagnosis Date Noted    Left shoulder pain 04/22/2025    LUE weakness 04/22/2025    Decreased ROM of left shoulder 04/22/2025    Impaired gait and mobility 04/22/2025    Impairment of balance 04/22/2025    Lack of coordination 04/22/2025    Cognitive communication deficit 04/18/2025    Injury due to skiing accident 04/11/2025    Positive hepatitis C antibody test 03/28/2023    Medicare welcome exam 03/28/2023    Altitude sickness 02/15/2023    Bilateral asymmetric sensorineural hearing loss 02/15/2023    BPH (benign prostatic hyperplasia) 02/15/2023    Cervical radiculopathy 02/15/2023    Neural foraminal stenosis of cervical spine 02/15/2023    Primary insomnia 02/15/2023    Right lumbar radiculopathy 02/15/2023    Subjective tinnitus, bilateral 02/15/2023    Urinary retention 04/15/2025    BPH with obstruction/lower urinary tract symptoms 04/15/2025        Family History[3]     Current Medications[4]     Allergies[5]     Social History[6]     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 , 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, has business    Review of Systems  Constitutional:  Denies fever or chills   Eyes:  Denies change in visual acuity   HENT:  Denies nasal congestion or sore throat   Respiratory:  Denies cough or shortness of breath   Cardiovascular:  Denies chest pain or edema   GI:  Denies abdominal pain, nausea, vomiting, bloody stools or diarrhea   :  Denies dysuria   Integument:  Denies rash   Neurologic: as per HPI  MSK: Per above HPI  Endocrine:  Denies polyuria or polydipsia   Lymphatic:  Denies swollen glands   Psychiatric:  Denies depression or anxiety         Physical Exam:  /75 (BP Location: Left arm, Patient Position: Sitting)   Pulse 73   Temp 36.8 °C (98.2 °F)   Resp 20      General:  NAD, M    Psychiatric: appropriate mood & affect.   Cardiovascular:  Normal radial pulses; no UE  edema.  Respiratory:  Normal rate; unlabored breathing.  Skin:  Intact; no erythema; no ecchymosis or rash.  Lymphatic:  No lymphadenopathy or lymphedema.  NEURO:  Alert and appropriate.  Speech fluent, conversing appropriately.   Toya  Nystagmus looking left  Left facial droop mild    Motor:    Rt: SA 5/5, SER 5/5, EE 5/5, EF 5/5, WE 5/5, FDIM 5/5, ADM 5/5    Lt: SA 5/5, SER 5/5, EE 5/5, EF 5/5, WE 5/5, FDIM 5/5, ADM 5/5  LE strength 5/5  Sensation:     Light touch decrease left hand     Reflexes:     Right: Biceps 2+, brachioradialis 2+, triceps 2+, patellar 2+, achilles 2+    Left: Biceps 3+, brachioradialis 3+, triceps 3+, patellar 3+, achilles 3+     Babinski's downgoing; no clonus     Hoffmans: positive left   Gait: spastic on L side  Praneeth intact  Finger to nose intact bl         Impression:  Kike Conde is a 69 y.o. M w pmh history of grade I BARNDON injury on 3/1 in [place] when skiing. He was initially seen at [facility], presumably might have collided with another skier. Had LOC.   He was admitted to [facility] by air on 3/1/25 after he collided with another skier. He had a loss of consciousness. 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  He presents with cognitive fatigue, impaired sleep, difficulty w concentration, decrease left shoulder ROM due to oa and likely rtc tear, urinary retention likely due to mostly BPH, carotid dissection and mandibular fx.     .      Plan:  Impaired concentration, cognitive fatigue  - start amantadine 100 mg am, can  then in one week increase to bid    Adjustment/depression/cog impairment  - referral to Dr. 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     Keira Garrett MD      Physical Medicine and Rehabilitation          [1]   Past Medical History:  Diagnosis Date    Adjustment insomnia 03/07/2016    Transient insomnia    Hemorrhage of anus and rectum 04/12/2019    BRBPR (bright red blood per rectum)    Impacted cerumen, right ear 03/07/2016    Impacted cerumen of right ear    Opioid abuse, in remission 03/12/2017    History of heroin abuse    Personal history of other diseases of the respiratory system 01/24/2018    History of influenza   [2]   Past Surgical History:  Procedure Laterality Date    BACK SURGERY  04/18/2018    Back Surgery    HERNIA REPAIR  04/18/2018    Hernia Repair    OTHER SURGICAL HISTORY  03/28/2017    Hemilaminectomy With Disc Removal One Lumbar Interspace   [3]   Family History  Problem Relation Name Age of Onset    Other (CREUTZFELDT LORNA DISEASE) Mother      Melanoma Other SIBLING    [4]   Current Outpatient Medications   Medication Sig Dispense Refill    acetaminophen 160 mg/5 mL (5 mL) suspension 650 mg by nasogastric tube route  every 6 hours if needed for mild pain (1 - 3). Indications: pain      aspirin 81 mg chewable tablet 1 tablet (81 mg) by nasogastric tube route once daily. crush and give via gt      carvedilol (Coreg) 6.25 mg tablet Take 1 tablet (6.25 mg) by g-tube 2 times a day. given via ngt      chlorhexidine (Peridex) 0.12 % solution Use 15 mL in the mouth or throat 2 times a day. swish and spit      doxazosin (Cardura) 4 mg tablet Take 1 tablet (4 mg) by g-tube once daily at bedtime. given via ngt      meloxicam (Mobic) 15 mg tablet Take 1 tablet (15 mg) by g-tube once daily. given via ngt      QUEtiapine (SEROquel) 50 mg tablet Take 1 tablet (50 mg) by g-tube once daily at bedtime. given via ngt (Patient taking differently: Take 0.5 tablets (25 mg) by g-tube once daily at bedtime. given via ngt)      traZODone (Desyrel) 100 mg tablet Take 1 tablet (100 mg) by g-tube once daily at bedtime. given via ngt      zolpidem (Ambien) 10 mg tablet TAKE ONE TABLET BY MOUTH AT BEDTIME AS NEEDED for insomnia 30 tablet 4    Comirnaty 2024-25, 12y up,,PF, 30 mcg/0.3 mL syringe  (Patient not taking: Reported on 4/28/2025)      Fluad Triv 2024-25,65y up,,PF, 45 mcg (15 mcg x 3)/0.5 mL syringe  (Patient not taking: Reported on 4/28/2025)      lubricating eye drops (Refresh Classic, PF,) ophthalmic solution Administer 1 drop into the right eye 4 times a day as needed for dry eyes. (Patient not taking: Reported on 4/28/2025)      multivitamin with minerals (MULTIPLE VITAMIN-MINERALS ORAL) Take by mouth. CLARIFY DIRECTIONS      omega-3 fatty acids-fish oil 300-1,000 mg capsule Take by mouth.      oxyCODONE (Roxicodone) 5 mg/5 mL solution 5 mL (5 mg) by nasogastric tube route 3 times a day as needed for severe pain (7 - 10). (Patient not taking: Reported on 4/28/2025)      white petrolatum-mineral oiL 94-3 % ophthalmic ointment Apply a small amount (one-fourth inch) of ointment to the inside of the affected eyelids as needed for dryness or  irritation. (Patient not taking: Reported on 4/28/2025)       No current facility-administered medications for this visit.   [5] No Known Allergies  [6]   Social History  Socioeconomic History    Marital status:    Tobacco Use    Smoking status: Never    Smokeless tobacco: Never   Vaping Use    Vaping status: Never Used   Substance and Sexual Activity    Alcohol use: Yes     Alcohol/week: 3.0 standard drinks of alcohol     Types: 3 Glasses of wine per week     Comment: not since accident    Drug use: Not Currently     Types: Marijuana     Comment: recrational rare     Social Drivers of Health     Transportation Needs: No Transportation Needs (4/17/2025)    OASIS : Transportation     Lack of Transportation (Medical): No     Lack of Transportation (Non-Medical): No     Patient Unable or Declines to Respond: No   Social Connections: Feeling Socially Integrated (4/17/2025)    OASIS : Social Isolation     Frequency of experiencing loneliness or isolation: Never

## 2025-04-29 ENCOUNTER — APPOINTMENT (OUTPATIENT)
Dept: UROLOGY | Facility: CLINIC | Age: 70
End: 2025-04-29
Payer: MEDICARE

## 2025-04-29 VITALS
SYSTOLIC BLOOD PRESSURE: 135 MMHG | DIASTOLIC BLOOD PRESSURE: 78 MMHG | HEART RATE: 82 BPM | WEIGHT: 157.2 LBS | HEIGHT: 72 IN | TEMPERATURE: 97.5 F | BODY MASS INDEX: 21.29 KG/M2

## 2025-04-29 DIAGNOSIS — N40.1 BPH WITH OBSTRUCTION/LOWER URINARY TRACT SYMPTOMS: ICD-10-CM

## 2025-04-29 DIAGNOSIS — N13.8 BPH WITH OBSTRUCTION/LOWER URINARY TRACT SYMPTOMS: ICD-10-CM

## 2025-04-29 DIAGNOSIS — R33.9 URINARY RETENTION: ICD-10-CM

## 2025-04-29 PROCEDURE — 52000 CYSTOURETHROSCOPY: CPT | Performed by: STUDENT IN AN ORGANIZED HEALTH CARE EDUCATION/TRAINING PROGRAM

## 2025-04-29 PROCEDURE — 99214 OFFICE O/P EST MOD 30 MIN: CPT | Performed by: STUDENT IN AN ORGANIZED HEALTH CARE EDUCATION/TRAINING PROGRAM

## 2025-04-29 ASSESSMENT — PAIN SCALES - GENERAL: PAINLEVEL_OUTOF10: 0 - NO PAIN

## 2025-04-29 ASSESSMENT — PAIN - FUNCTIONAL ASSESSMENT: PAIN_FUNCTIONAL_ASSESSMENT: 0-10

## 2025-04-29 NOTE — PROGRESS NOTES
Chief complaint:  cystoscopy (Cath change)  Referring physician:  Esdras Pena MD     SUBJECTIVE:  HPI:  Kike Conde is a 69 y.o. male with a history of TBI on ppx ASA 81mg who presents follow up of urinary retention and BPH, pre-op discussion for TURP 5/19.  Here with his wife who assists with history.    Today doing better, NJ has been removed.  CT pelvis reviewed, NJ was still in place, prostate 48g, no bladder stones.    4/15 - Skiing TBI early March in [place], protracted recovery since.  LUTS prior but no intervention, now in retention, failed void trial x2 even on alpha blocker and back to ambulatory near baseline.  One episode of mild gross hematuria related to exertion, otherwise draining well yellow.  Patient and wife eager to be rid of catheter.  Patient alert, oriented but long term memory loss.    Medical history:   has a past medical history of Adjustment insomnia (03/07/2016), Hemorrhage of anus and rectum (04/12/2019), Impacted cerumen, right ear (03/07/2016), Opioid abuse, in remission (03/12/2017), and Personal history of other diseases of the respiratory system (01/24/2018).   Surgical history:   has a past surgical history that includes Other surgical history (03/28/2017); Back surgery (04/18/2018); and Hernia repair (04/18/2018).  Family history:  family history includes CREUTZFELDT LORNA DISEASE in his mother; Melanoma in an other family member.  Social history:   reports that he has never smoked. He has never used smokeless tobacco. He reports current alcohol use of about 3.0 standard drinks of alcohol per week. He reports that he does not currently use drugs after having used the following drugs: Marijuana.    Medications:    Current Outpatient Medications   Medication Instructions    acetaminophen (TYLENOL) 650 mg, Every 6 hours PRN    amantadine (SYMMETREL) 100 mg, oral, 2 times daily, Take 1 cap in the morning for a week and then twice a day (morning and lunch)    aspirin 81 mg,  "Daily    carvedilol (COREG) 6.25 mg, 2 times daily    chlorhexidine (Peridex) 0.12 % solution 15 mL, 2 times daily    doxazosin (CARDURA) 4 mg, Nightly    meloxicam (MOBIC) 15 mg, Daily    multivitamin with minerals (MULTIPLE VITAMIN-MINERALS ORAL) Take by mouth. CLARIFY DIRECTIONS    omega-3 fatty acids-fish oil 300-1,000 mg capsule Take by mouth.    predniSONE (DELTASONE) 40 mg, oral, Daily    QUEtiapine (SEROQUEL) 50 mg, Nightly    traZODone (DESYREL) 100 mg, Nightly    zolpidem (AMBIEN) 10 mg, oral, Nightly PRN      Allergies:    RX Allergies[1]     ROS:  14-point review of systems negative except as noted above.    OBJECTIVE:  Visit Vitals  /78   Pulse 82   Temp 36.4 °C (97.5 °F)   Body mass index is 21.32 kg/m².    Physical exam  General:  No acute distress  HEENT:  EOMI  CV:  Regular rate  Pulm:  Nonlabored respirations  Abd:  Soft, non-distended  :  No suprapubic or CVA tenderness  MSK:  No contractures  Neuro:  Motor intact  Psych:  Appropriate affect    Labs:    Lab Results   Component Value Date    WBC 7.0 04/17/2025    HGB 12.1 (L) 04/17/2025    HCT 36.4 (L) 04/17/2025     (H) 04/17/2025    ALT 16 04/17/2025    AST 16 04/17/2025     04/17/2025    K 4.1 04/17/2025     04/17/2025    CREATININE 0.71 04/17/2025    BUN 16 04/17/2025    CO2 30 04/17/2025   No results found for: \"URINECULTURE\"   Lab Results   Component Value Date    PSA 1.27 04/17/2025     Imaging:  All imaging discussed in [facility] was independently reviewed.    PROCEDURE NOTE - CYSTOSCOPY:  The patient was brought into the procedure suite and informed consent was reviewed and confirmed.  The patient was escorted onto the stretcher, placed supine, existing Jarvis removed, prepped with betadine and draped in the usual fashion.  Intraurethral 2% viscous lidocaine jelly was used for local analgesia.  A 16 [place] flexible cystourethroscope was inserted per urethra.     The bladder was filled and careful pancystoscopy including " retroflexion noted:  - Urethra:  patent  - Prostate: trilobar obstructing with large intravesical protrusion  - Bladder: urine cloudy, no papillary urothelial lesions, moderate trabeculations, difficult to see Uos   Jarvis placed, clear urine collected for culture.  This concluded the procedure which the patient tolerated well.  Patient was cleaned and dried.    ASSESSMENT:  BPH (48g) with obstruction  Urinary retention    Given size 48g appropriate to proceed with TURP.  Discussed risks/benefits/alternatives, expected yareli-op course.    PLAN:  TURP 5/19, plan dc same day, 1 night prn if CBI  Can continue ppx asa 81mg  Ucx today    Follow-up 1 week post op void trial    Esdras Pena MD    Problem List Items Addressed This Visit       Urinary retention    Relevant Orders    Urine Culture    BPH with obstruction/lower urinary tract symptoms    Relevant Orders    Urine Culture             [1] No Known Allergies

## 2025-04-30 ENCOUNTER — APPOINTMENT (OUTPATIENT)
Dept: PHYSICAL THERAPY | Facility: CLINIC | Age: 70
End: 2025-04-30
Payer: MEDICARE

## 2025-04-30 ENCOUNTER — TREATMENT (OUTPATIENT)
Dept: SPEECH THERAPY | Facility: CLINIC | Age: 70
End: 2025-04-30
Payer: MEDICARE

## 2025-04-30 DIAGNOSIS — R41.841 COGNITIVE COMMUNICATION DEFICIT: Primary | ICD-10-CM
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
## 2025-04-30 PROCEDURE — 92507 TX SP LANG VOICE COMM INDIV: CPT | Mod: GN

## 2025-04-30 ASSESSMENT — PAIN - FUNCTIONAL ASSESSMENT: PAIN_FUNCTIONAL_ASSESSMENT: 0-10

## 2025-04-30 ASSESSMENT — PAIN SCALES - GENERAL: PAINLEVEL_OUTOF10: 0 - NO PAIN

## 2025-04-30 NOTE — PROGRESS NOTES
"Speech-Language Pathology    SLP Adult Outpatient Speech-Language Pathology Treatment     Patient Name: Kike Conde  MRN: [id number]  Today's Date: 4/30/2025  Time Calculation  Start Time: 0905  Stop Time: 0950  Time Calculation (min): 45 min     Current Problem:        ICD-10-CM    Injury due to skiing accident V00.328A            Cognitive communication deficit R41.841       SLP Assessment:  SLP TX Intervention Outcome: Making Progress Towards Goals  Treatment Tolerance: Patient tolerated treatment well       Plan:  SLP TX Plan: Continue Plan of Care  SLP Frequency: 2x per week  Discussed POC: Patient  Discussed Risks/Benefits: Yes, Patient  Patient/Caregiver Agreeable: Yes      Subjective   Patient arrived to and attended treatment accompanied by his wife. Patient appeared to be in a good mood, having recently had all \"metal\" taken out of his mouth. Patient now with limited jaw mobility, but was given a couple exercises to address this. Patient has started to slowly transition off liquid diet without any pain or fatigue. They expressed interest in [employer] technology. Wife also reports vision is improving. Patient with a cold over the weekend, so use of HEP was limited, especially with all of the doctor appointments.     Certification Period End Date: 7/17/2025  Number of Authorized Treatments : Based on [place] ([employer]ET[employer] MEDICARE, $40 CO PAY)  Total Number of Visits : 4    Pain Assessment:  Pain Assessment  Pain Assessment: 0-10  0-10 (Numeric) Pain Score: 0 - No pain      Objective     [place] Naming Test (BNT):  --Spontaneous correct responses: 45/60  --Stimulus cues given: 2  --Correct responses with stimulus cues: 0  --Phonemic cues give: 15  --Correct responses with phonemic cues: 13  --Multiple choice cues given: 4  --Correct responses with multiple choice cues: 4  *Patient noted to use gestures, delays, description, and related words independently to cue himself     GOALS: Start date: 4/18/2025 ; End/re-eval " date: 7/17/2025  By discharge:  LTG:   Patient will independently and effectively communicate in valued conversations in the home and social settings using compensatory strategies as needed.   >GOAL STATUS: Ongoing; progressing    Patient will increase functional independence to participate in the home, community, and work environment.   >GOAL STATUS: Ongoing; progressing    STGs:   Patient will perform word retrieval tasks for vocabulary/topics/tasks identified as personally-relevant to the patient with 90% accuracy given minimal cueing.  >ADDRESSED: Semantic Feature Analysis was completed today using the [employer]. Patient required max assistance on describing how things look and moderate assistance on assigning the object to a category. For all other prompts, patient answered with min-0 assistance. Patient's descriptions became more thorough with less assistance as the activity progressed. Patient also completed generative naming on personally relevant topics, which he completed with 100% accuracy given mild assistance. Continuation of HEP encouraged.   >GOAL STATUS: Ongoing; making progress  Patient will demonstrate knowledge and use of >= 2 compensatory strategies for impaired word recall and will independently utilize them in 4/5 opportunities at the discourse level to prevent communication breakdowns.  >ADDRESSED: Circumlocution reviewed today while completing SFA activity. Patient expressed understanding of the concept, citing how he would use it in conversation. It was then formally practiced with an object description activity. Patient completed the activity with 100% accuracy with x1 trial necessitating prompting.   >GOAL STATUS: Ongoing; making progress  Patient will utilize compensatory strategies for memory in order to perform tasks relevant to the patient with 90% accuracy given minimal cueing.    >NOT ADDRESSED:   >GOAL STATUS: Ongoing; progressing    Patient will utilize compensatory  strategies for executive functioning in order to perform tasks relevant to the patient with 90% accuracy given minimal cueing.    >NOT ADDRESSED:   >GOAL STATUS: Ongoing; progressing    Patient will utilize learned fatigue/frustration management strategies independently.    >NOT ADDRESSED:   >GOAL STATUS: Ongoing; making progress      Outpatient Education:  Adult Outpatient Education  Individual(s) Educated: Patient  Verbal Education : Feedback on session performance  Risk and Benefits Discussed with Patient/Caregiver/Other: yes  Patient/Caregiver Demonstrated Understanding: yes  Plan of Care Discussed and Agreed Upon: yes  Patient Response to Education: Patient/Caregiver Verbalized Understanding of Information, Patient/Caregiver Asked Appropriate Questions

## 2025-05-01 ENCOUNTER — OFFICE VISIT (OUTPATIENT)
Dept: VASCULAR SURGERY | Facility: CLINIC | Age: 70
End: 2025-05-01
Payer: MEDICARE

## 2025-05-01 ENCOUNTER — APPOINTMENT (OUTPATIENT)
Dept: PHYSICAL THERAPY | Facility: CLINIC | Age: 70
End: 2025-05-01
Payer: MEDICARE

## 2025-05-01 VITALS
SYSTOLIC BLOOD PRESSURE: 127 MMHG | BODY MASS INDEX: 21.77 KG/M2 | HEIGHT: 72 IN | OXYGEN SATURATION: 95 % | HEART RATE: 63 BPM | WEIGHT: 160.7 LBS | DIASTOLIC BLOOD PRESSURE: 75 MMHG

## 2025-05-01 DIAGNOSIS — I77.72 ILIAC ARTERY DISSECTION (MULTI): Primary | ICD-10-CM

## 2025-05-01 PROCEDURE — 99214 OFFICE O/P EST MOD 30 MIN: CPT | Performed by: SURGERY

## 2025-05-01 PROCEDURE — 3008F BODY MASS INDEX DOCD: CPT | Performed by: SURGERY

## 2025-05-01 PROCEDURE — 1159F MED LIST DOCD IN RCRD: CPT | Performed by: SURGERY

## 2025-05-01 PROCEDURE — 1126F AMNT PAIN NOTED NONE PRSNT: CPT | Performed by: SURGERY

## 2025-05-01 PROCEDURE — 99204 OFFICE O/P NEW MOD 45 MIN: CPT | Performed by: SURGERY

## 2025-05-01 PROCEDURE — 1036F TOBACCO NON-USER: CPT | Performed by: SURGERY

## 2025-05-01 PROCEDURE — 1160F RVW MEDS BY RX/DR IN RCRD: CPT | Performed by: SURGERY

## 2025-05-01 ASSESSMENT — PATIENT HEALTH QUESTIONNAIRE - PHQ9
2. FEELING DOWN, DEPRESSED OR HOPELESS: NOT AT ALL
1. LITTLE INTEREST OR PLEASURE IN DOING THINGS: NOT AT ALL
SUM OF ALL RESPONSES TO PHQ9 QUESTIONS 1 AND 2: 0

## 2025-05-01 ASSESSMENT — PAIN SCALES - GENERAL: PAINLEVEL_OUTOF10: 0-NO PAIN

## 2025-05-01 NOTE — PROGRESS NOTES
Vascular Surgery Clinic Note    CC: Hx of right ALLEN dissection    HPI:  Kike Conde is 69 y.o. male with history of traumatic right ALLEN dissection here to establish care. Patient was in an unfortunate ski accident in [place] with multiple injuries. He was treated there and workup included CTA which demonstrated right ALLEN dissection. From review of notes, it does not appear that this was flow limiting and did not cause vascular compromise. He has returned home and is still recovering. No claudication, rest pain or tissue loss. He has never had vascular surgeries. Does not smoke. Otherwise prior to accident was very active and healthy.     Past Vascular History:  None    Past Vascular Testing:      Medical History:  Problem List[1]     Meds:   Medications Ordered Prior to Encounter[2]     Allergies:   RX Allergies[3]    SH:    Social Drivers of Health     Tobacco Use: Low Risk  (5/1/2025)    Patient History     Smoking Tobacco Use: Never     Smokeless Tobacco Use: Never     Passive Exposure: Not on file   Alcohol Use: Not on file   Financial Resource Strain: Not on file   Food Insecurity: Not on file   Transportation Needs: No Transportation Needs (4/17/2025)    OASIS : Transportation     Lack of Transportation (Medical): No     Lack of Transportation (Non-Medical): No     Patient Unable or Declines to Respond: No   Physical Activity: Not on file   Stress: Not on file   Social Connections: Feeling Socially Integrated (4/17/2025)    OASIS : Social Isolation     Frequency of experiencing loneliness or isolation: Never   Intimate Partner Violence: Not on file   Depression: Not at risk (5/1/2025)    PHQ-2     PHQ-2 Score: 0   Recent Concern: Depression - Moderate depression (4/8/2025)    Received from [facility]    PHQ-9     PHQ-9 Total Score (5-9 = mild, 10-14 = moderate, 15-19 = Moderately severe, 20 and up = severe): 11   Housing Stability: Not on file   Utilities: Not on file   Digital Equity:  Not on file   Health Literacy: Adequate Health Literacy (4/17/2025)    OASIS : Health Literacy     Frequency of needing help to read materials from doctor or pharmacy: Never        FH:  Family History[4]     ROS:  All systems were reviewed and are negative except as per HPI.    Objective:  Vitals:  Vitals:    05/01/25 1318   BP: 127/75   Pulse: 63   SpO2:         Exam:  Constitutional: normal, well appearing  HEENT: normocephalic  CV: regular rate  RESP: symmetric expansion, unlabored breathing  GI: soft, nontender, nondistended  MSK: normal ROM  INT: no lesions  PSYCH: appropriate mood  NEURO: no deficits  VASC: Palpable DP and PT bilaterally    Assessment & Plan:  Kike Conde is 69 y.o. male with traumatic right ALLEN dissection    - CTA a/p to evaluate left common iliac artery dissection  - Telephone visit in 1-2 weeks to discuss results after scan completed      Meds  - ASA 81 mg    Screening/Surveillance  - CTA a/p (May 2025)    Next Followup  - 1-2 weeks telephone visit    Karmen Benitez MD               [1]   Patient Active Problem List  Diagnosis    Altitude sickness    Bilateral asymmetric sensorineural hearing loss    BPH (benign prostatic hyperplasia)    Cervical radiculopathy    Neural foraminal stenosis of cervical spine    Primary insomnia    Right lumbar radiculopathy    Subjective tinnitus, bilateral    Positive hepatitis C antibody test    Medicare welcome exam    Injury due to skiing accident    Urinary retention    BPH with obstruction/lower urinary tract symptoms    Cognitive communication deficit    Left shoulder pain    LUE weakness    Decreased ROM of left shoulder    Impaired gait and mobility    Impairment of balance    Lack of coordination   [2]   Current Outpatient Medications on File Prior to Visit   Medication Sig Dispense Refill    acetaminophen 160 mg/5 mL (5 mL) suspension 650 mg by nasogastric tube route every 6 hours if needed for mild pain (1 - 3). Indications: pain       amantadine (Symmetrel) 100 mg capsule Take 1 capsule (100 mg) by mouth 2 times a day. Take 1 cap in the morning for a week and then twice a day (morning and lunch) 60 capsule 0    aspirin 81 mg chewable tablet 1 tablet (81 mg) by nasogastric tube route once daily. crush and give via gt      carvedilol (Coreg) 6.25 mg tablet Take 1 tablet (6.25 mg) by g-tube 2 times a day. given via ngt      chlorhexidine (Peridex) 0.12 % solution Use 15 mL in the mouth or throat 2 times a day. swish and spit      doxazosin (Cardura) 4 mg tablet Take 1 tablet (4 mg) by g-tube once daily at bedtime. given via ngt      meloxicam (Mobic) 15 mg tablet Take 1 tablet (15 mg) by g-tube once daily. given via ngt      multivitamin with minerals (MULTIPLE VITAMIN-MINERALS ORAL) Take by mouth. CLARIFY DIRECTIONS      omega-3 fatty acids-fish oil 300-1,000 mg capsule Take by mouth.      predniSONE (Deltasone) 20 mg tablet Take 2 tablets (40 mg) by mouth once daily for 5 days. 10 tablet 0    QUEtiapine (SEROquel) 50 mg tablet Take 1 tablet (50 mg) by g-tube once daily at bedtime. given via ngt      traZODone (Desyrel) 100 mg tablet Take 1 tablet (100 mg) by g-tube once daily at bedtime. given via ngt      zolpidem (Ambien) 10 mg tablet TAKE ONE TABLET BY MOUTH AT BEDTIME AS NEEDED for insomnia 30 tablet 4    [DISCONTINUED] Comirnaty 2024-25, 12y up,,PF, 30 mcg/0.3 mL syringe  (Patient not taking: Reported on 4/28/2025)      [DISCONTINUED] Fluad Triv 2024-25,65y up,,PF, 45 mcg (15 mcg x 3)/0.5 mL syringe  (Patient not taking: Reported on 4/28/2025)      [DISCONTINUED] lubricating eye drops (Refresh Classic, PF,) ophthalmic solution Administer 1 drop into the right eye 4 times a day as needed for dry eyes. (Patient not taking: Reported on 4/28/2025)      [DISCONTINUED] oxyCODONE (Roxicodone) 5 mg/5 mL solution 5 mL (5 mg) by nasogastric tube route 3 times a day as needed for severe pain (7 - 10). (Patient not taking: Reported on 4/28/2025)       [DISCONTINUED] white petrolatum-mineral oiL 94-3 % ophthalmic ointment Apply a small amount (one-fourth inch) of ointment to the inside of the affected eyelids as needed for dryness or irritation. (Patient not taking: Reported on 4/28/2025)       No current facility-administered medications on file prior to visit.   [3] No Known Allergies  [4]   Family History  Problem Relation Name Age of Onset    Other (CREUTZFELDT LORNA DISEASE) Mother      Melanoma Other [place]

## 2025-05-01 NOTE — PATIENT INSTRUCTIONS
- CTA a/p to evaluate left common iliac artery dissection  - Telephone visit in 1-2 weeks to discuss results after scan completed  - Call office [phone] with any questions or concerns

## 2025-05-02 ENCOUNTER — TREATMENT (OUTPATIENT)
Dept: SPEECH THERAPY | Facility: CLINIC | Age: 70
End: 2025-05-02
Payer: MEDICARE

## 2025-05-02 DIAGNOSIS — R41.841 COGNITIVE COMMUNICATION DEFICIT: ICD-10-CM
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
## 2025-05-02 LAB — BACTERIA UR CULT: ABNORMAL

## 2025-05-02 PROCEDURE — 97130 THER IVNTJ EA ADDL 15 MIN: CPT | Mod: GN

## 2025-05-02 PROCEDURE — 97129 THER IVNTJ 1ST 15 MIN: CPT | Mod: GN

## 2025-05-02 ASSESSMENT — PAIN - FUNCTIONAL ASSESSMENT: PAIN_FUNCTIONAL_ASSESSMENT: 0-10

## 2025-05-02 ASSESSMENT — PAIN SCALES - GENERAL: PAINLEVEL_OUTOF10: 0 - NO PAIN

## 2025-05-02 NOTE — PROGRESS NOTES
"Speech-Language Pathology    SLP Adult Outpatient Speech-Language Pathology Treatment     Patient Name: Kike Conde  MRN: [id number]  Today's Date: 5/2/2025  Time Calculation  Start Time: 0845  Stop Time: 0935  Time Calculation (min): 50 min    Current Problem:        ICD-10-CM    Injury due to skiing accident V00.328A            Cognitive communication deficit R41.841       SLP Assessment:  SLP TX Intervention Outcome: Making Progress Towards Goals  Treatment Tolerance: Patient tolerated treatment well       Plan:  SLP TX Plan: Continue Plan of Care  SLP Frequency: 2x per week  Discussed POC: Patient  Discussed Risks/Benefits: Yes, Patient  Patient/Caregiver Agreeable: Yes  Next Session: executive functioning tasks (ex. medication management, planning/organization), word-finding, check in re: fatigue/stress management tool box  HEP: word-finding and language packet    Subjective   Patient arrived to and attended treatment accompanied by his wife. Patient appeared to be in a good mood, and remained engaged throughout the session. He did express some frustrations (ex. Frequently apologizing, saying he was \"retarded\"), but continued to work through the activities. Patient and wife excited that patient is beginning to do more at home, specifically doing the dishes and putting them away correctly (reports about 95% accuracy).    General Visit Information:  Certification Period Start Date: 4/18/2025  Certification Period End Date: 7/17/2025  Number of Authorized Treatments : Based on [place] (AETNA MEDICARE, $40 CO PAY)  Total Number of Visits : 5    Pain Assessment:  Pain Assessment  Pain Assessment: 0-10  0-10 (Numeric) Pain Score: 0 - No pain      Objective     [place] Naming Test (BNT):  --Spontaneous correct responses: 45/60  --Stimulus cues given: 2  --Correct responses with stimulus cues: 0  --Phonemic cues give: 15  --Correct responses with phonemic cues: 13  --Multiple choice cues given: 4  --Correct responses with " multiple choice cues: 4  *Patient noted to use gestures, delays, description, and related words independently to cue himself     GOALS: Start date: 4/18/2025 ; End/re-eval date: 7/17/2025  By discharge:  LTG:   Patient will independently and effectively communicate in valued conversations in the home and social settings using compensatory strategies as needed.   >GOAL STATUS: Ongoing; progressing    Patient will increase functional independence to participate in the home, community, and work environment.   >GOAL STATUS: Ongoing; progressing    STGs:   Patient will perform word retrieval tasks for vocabulary/topics/tasks identified as personally-relevant to the patient with 90% accuracy given minimal cueing.  >ADDRESSED: Patient completed generative naming tasks for functional topics, like kitchen items and cleaning products/tools. Patient completed task given moderate assistance. Episodes of word-finding issues were evident throughout, patient worked through these and was sometimes successful by using gestures, synonyms, and description independently. Continuation of HEP encouraged.    >GOAL STATUS: Ongoing; making progress  Patient will demonstrate knowledge and use of >= 2 compensatory strategies for impaired word recall and will independently utilize them in 4/5 opportunities at the discourse level to prevent communication breakdowns.  >ADDRESSED: During episodes of word-finding, patient often used gestures, synonyms, and description independently. These attempts were sometimes successful.   >GOAL STATUS: Ongoing; making progress  Patient will utilize compensatory strategies for memory in order to perform tasks relevant to the patient with 90% accuracy given minimal cueing.    >NOT ADDRESSED:   >GOAL STATUS: Ongoing; progressing    Patient will utilize compensatory strategies for executive functioning in order to perform tasks relevant to the patient with 90% accuracy given minimal cueing.    >ADDRESSED: Patient  "participated in certain subtests of the FSTAC , including medication management and grocery shopping. He utilized self-talk to complete activities. Patient's vision appeared adequate for reading, but he did report some difficulties making out the pictures in the grocery shopping activities. The instructions for the grocery task instructed the patient to look at the list (list items were random/not grouped by aisle) and plan the shortest route through the grocery store. Patient recited and understood those instructions, but did not execute them. He said that in his own life, he would be able to \"zip around\" the store and not have to follow those instructions. Time to complete the activity was mildly prolonged. Patient also with errors on medication management activity, specifically with the bottle label that said \"three times a day on Sun, Tues, Thurs.\" Some signs of impulsivity throughout the tasks, ex. disregarding pop-ups and closing them without reading them. Patient added 4 pills on the correct days.   >GOAL STATUS: Ongoing; progressing    Patient will utilize learned fatigue/frustration management strategies independently.    >NOT ADDRESSED:   >GOAL STATUS: Ongoing; making progress      Outpatient Education:  Adult Outpatient Education  Individual(s) Educated: Patient  Verbal Education : Feedback on session performance  Risk and Benefits Discussed with Patient/Caregiver/Other: yes  Patient/Caregiver Demonstrated Understanding: yes  Plan of Care Discussed and Agreed Upon: yes  Patient Response to Education: Patient/Caregiver Verbalized Understanding of Information, Patient/Caregiver Asked Appropriate Questions       "

## 2025-05-05 ENCOUNTER — APPOINTMENT (OUTPATIENT)
Dept: NEUROSURGERY | Facility: CLINIC | Age: 70
End: 2025-05-05
Payer: MEDICARE

## 2025-05-05 ENCOUNTER — APPOINTMENT (OUTPATIENT)
Dept: PHYSICAL THERAPY | Facility: CLINIC | Age: 70
End: 2025-05-05
Payer: MEDICARE

## 2025-05-05 VITALS
TEMPERATURE: 97.3 F | RESPIRATION RATE: 17 BRPM | HEIGHT: 72 IN | HEART RATE: 67 BPM | BODY MASS INDEX: 21.77 KG/M2 | DIASTOLIC BLOOD PRESSURE: 78 MMHG | WEIGHT: 160.72 LBS | SYSTOLIC BLOOD PRESSURE: 117 MMHG
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
## 2025-05-05 DIAGNOSIS — R33.9 URINARY RETENTION: Primary | ICD-10-CM

## 2025-05-05 DIAGNOSIS — D64.9 ANEMIA, UNSPECIFIED TYPE: Primary | ICD-10-CM

## 2025-05-05 PROCEDURE — 1157F ADVNC CARE PLAN IN RCRD: CPT | Performed by: NEUROLOGICAL SURGERY

## 2025-05-05 PROCEDURE — 3008F BODY MASS INDEX DOCD: CPT | Performed by: NEUROLOGICAL SURGERY

## 2025-05-05 PROCEDURE — 1126F AMNT PAIN NOTED NONE PRSNT: CPT | Performed by: NEUROLOGICAL SURGERY

## 2025-05-05 PROCEDURE — 99212 OFFICE O/P EST SF 10 MIN: CPT | Performed by: NEUROLOGICAL SURGERY

## 2025-05-05 PROCEDURE — 1159F MED LIST DOCD IN RCRD: CPT | Performed by: NEUROLOGICAL SURGERY

## 2025-05-05 RX ORDER — SULFAMETHOXAZOLE AND TRIMETHOPRIM 800; 160 MG/1; MG/1
1 TABLET ORAL 2 TIMES DAILY
Qty: 28 TABLET | Refills: 0 | Status: SHIPPED | OUTPATIENT
Start: 2025-05-05

## 2025-05-05 ASSESSMENT — ENCOUNTER SYMPTOMS
HEMATOLOGIC/LYMPHATIC NEGATIVE: 1
ALLERGIC/IMMUNOLOGIC NEGATIVE: 1
RESPIRATORY NEGATIVE: 1
NUMBNESS: 1
LIGHT-HEADEDNESS: 1
GASTROINTESTINAL NEGATIVE: 1
ACTIVITY CHANGE: 1
DEPRESSION: 1
OCCASIONAL FEELINGS OF UNSTEADINESS: 1
CARDIOVASCULAR NEGATIVE: 1
DIZZINESS: 1
LOSS OF SENSATION IN FEET: 1
CONFUSION: 1
FATIGUE: 1
ENDOCRINE NEGATIVE: 1
WEAKNESS: 1

## 2025-05-05 ASSESSMENT — PAIN SCALES - GENERAL: PAINLEVEL_OUTOF10: 0-NO PAIN

## 2025-05-05 NOTE — PROGRESS NOTES
Had a skiing accident on March 1st . Today has unsteady gait and dizziness. Is getting better.    69-year-old man who was involved in a skiing accident 2 months ago with traumatic brain injury with loss of consciousness presents to establish care.  The [place] the time of his injury.  He was hospitalized and then required inpatient rehabilitation.  Other injuries include a mandible fracture.  The patient is back here now.  He has started physical, occupational, and speech therapy.  He feels that he is starting to make a good recovery though he still has some difficulty with concentration and organization.  He also feels that his balance and vision are still a problem.    Review of Systems   Constitutional:  Positive for activity change and fatigue.   HENT: Negative.     Eyes:  Positive for visual disturbance.   Respiratory: Negative.     Cardiovascular: Negative.    Gastrointestinal: Negative.    Endocrine: Negative.    Musculoskeletal:  Positive for gait problem.   Skin: Negative.    Allergic/Immunologic: Negative.    Neurological:  Positive for dizziness, weakness, light-headedness and numbness.   Hematological: Negative.    Psychiatric/Behavioral:  Positive for confusion.        Visit Vitals  /78 (BP Location: Left arm, Patient Position: Sitting, BP Cuff Size: Adult)   Pulse 67   Temp 36.3 °C (97.3 °F) (Temporal)   Resp 17   Ht 1.829 m (6')   Wt 72.9 kg (160 lb 11.5 oz)   BMI 21.80 kg/m²   Smoking Status Never   BSA 1.92 m²         Current Medications[1]      Objective   Neurological Exam    On physical exam, he is alert and interactive.  Extraocular movements are intact.  Face moves symmetrically.  He moves all extremities symmetrically.    The patient suffered a traumatic brain injury and is recovering.  He should continue with his therapies as directed.  He already has established himself with a rehabilitation physician and has ophthalmology and neurology appointment scheduled.   Should his cognitive status not improve, formal neuropsychological testing could be considered.  I be happy to see him again if any new issues arise.         [1]   Current Outpatient Medications:     acetaminophen 160 mg/5 mL (5 mL) suspension, 650 mg by nasogastric tube route every 6 hours if needed for mild pain (1 - 3). Indications: pain, Disp: , Rfl:     amantadine (Symmetrel) 100 mg capsule, Take 1 capsule (100 mg) by mouth 2 times a day. Take 1 cap in the morning for a week and then twice a day (morning and lunch), Disp: 60 capsule, Rfl: 0    aspirin 81 mg chewable tablet, 1 tablet (81 mg) by nasogastric tube route once daily. crush and give via gt, Disp: , Rfl:     carvedilol (Coreg) 6.25 mg tablet, Take 1 tablet (6.25 mg) by g-tube 2 times a day. given via ngt, Disp: , Rfl:     chlorhexidine (Peridex) 0.12 % solution, Use 15 mL in the mouth or throat 2 times a day. swish and spit, Disp: , Rfl:     doxazosin (Cardura) 4 mg tablet, Take 1 tablet (4 mg) by g-tube once daily at bedtime. given via ngt, Disp: , Rfl:     meloxicam (Mobic) 15 mg tablet, Take 1 tablet (15 mg) by g-tube once daily. given via ngt, Disp: , Rfl:     multivitamin with minerals (MULTIPLE VITAMIN-MINERALS ORAL), Take by mouth. CLARIFY DIRECTIONS, Disp: , Rfl:     omega-3 fatty acids-fish oil 300-1,000 mg capsule, Take by mouth., Disp: , Rfl:     QUEtiapine (SEROquel) 50 mg tablet, Take 1 tablet (50 mg) by g-tube once daily at bedtime. given via ngt, Disp: , Rfl:     traZODone (Desyrel) 100 mg tablet, Take 1 tablet (100 mg) by g-tube once daily at bedtime. given via ngt, Disp: , Rfl:     zolpidem (Ambien) 10 mg tablet, TAKE ONE TABLET BY MOUTH AT BEDTIME AS NEEDED for insomnia, Disp: 30 tablet, Rfl: 4

## 2025-05-06 ENCOUNTER — HOME CARE VISIT (OUTPATIENT)
Dept: HOME HEALTH SERVICES | Facility: HOME HEALTH | Age: 70
End: 2025-05-06
Payer: MEDICARE

## 2025-05-07 ENCOUNTER — APPOINTMENT (OUTPATIENT)
Dept: NEUROLOGY | Facility: HOSPITAL | Age: 70
End: 2025-05-07
Payer: MEDICARE

## 2025-05-07 ENCOUNTER — TREATMENT (OUTPATIENT)
Dept: OCCUPATIONAL THERAPY | Facility: CLINIC | Age: 70
End: 2025-05-07
Payer: MEDICARE

## 2025-05-07 DIAGNOSIS — M25.512 LEFT SHOULDER PAIN: ICD-10-CM

## 2025-05-07 DIAGNOSIS — M25.612 DECREASED ROM OF LEFT SHOULDER: Primary | ICD-10-CM

## 2025-05-07 DIAGNOSIS — R29.898 LUE WEAKNESS: ICD-10-CM
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
## 2025-05-07 PROCEDURE — 97110 THERAPEUTIC EXERCISES: CPT | Mod: GO

## 2025-05-07 ASSESSMENT — PAIN SCALES - GENERAL: PAINLEVEL_OUTOF10: 0 - NO PAIN

## 2025-05-07 ASSESSMENT — PAIN - FUNCTIONAL ASSESSMENT: PAIN_FUNCTIONAL_ASSESSMENT: 0-10

## 2025-05-07 NOTE — PROGRESS NOTES
Occupational Therapy    Occupational Therapy Treatment    Name: Kike Conde  MRN: [id number]  : 1955  Date: 25      Time Calculation  Start Time: 845  Stop Time: 930  Time Calculation (min): 45 min        OT Therapeutic Procedures Time Entry  Therapeutic Exercise Time Entry: 40              Visit: 2  Anjum   25-25  Problem List Items Addressed This Visit           ICD-10-CM    Injury due to skiing accident V00.328A    Left shoulder pain M25.512    LUE weakness R29.898    Decreased ROM of left shoulder - Primary M25.612       Assessment:   Patient responded well to treatment session, demonstrated good carryover of cane exercises.     Plan:   Continue with skilled OT POC. Will add in light strengthening exercises next session.     Subjective   Patient agreeable to treatment session. Accompanied by spouse. Patient reports doing HEP.  Also reports doing more at home and more without assist (showering independently.)  Precautions:   STEADI: 7  Pain Assessment:  Pain Assessment  Pain Assessment: 0-10  0-10 (Numeric) Pain Score: 0 - No pain    Objective    Therapeutic Exercise: 40 mins  -Towel slides on table top 2 sets of 10   -L shoulder pendulums: forward and back, L<>R, clockwise/counter clockwise  -Cane AAROM exercises 2 sets of 10 each: shoulder extension, internal rotation, external rotation. Cues given for proper technique.    OP EDUCATION:   Continue with HEP  Added in cane exercises -given in handout form    Goals:  Active       OT Goals       HEP       Start:  25    Expected End:  25       Patient will be independent with established HEP for the LUE to maximize function for ADL/IADL tasks          ROM       Start:  25    Expected End:  25       Patient will demonstrate >= 130 degrees of L shoulder flexion for functional reaching tasks and ADLS         Strength       Start:  25    Expected End:  25       Patient will increase L shoulder strength by  1/2 muscle grade and L  strength by 10# or more for ADL/IADL tasks

## 2025-05-08 ENCOUNTER — APPOINTMENT (OUTPATIENT)
Dept: OPHTHALMOLOGY | Facility: CLINIC | Age: 70
End: 2025-05-08
Payer: MEDICARE

## 2025-05-08 ENCOUNTER — APPOINTMENT (OUTPATIENT)
Dept: OCCUPATIONAL THERAPY | Facility: CLINIC | Age: 70
End: 2025-05-08
Payer: MEDICARE

## 2025-05-08 ENCOUNTER — TREATMENT (OUTPATIENT)
Dept: PHYSICAL THERAPY | Facility: CLINIC | Age: 70
End: 2025-05-08
Payer: MEDICARE

## 2025-05-08 DIAGNOSIS — H53.9 VISION DISTURBANCE: ICD-10-CM

## 2025-05-08 DIAGNOSIS — Z87.820 HISTORY OF TRAUMATIC BRAIN INJURY: Primary | ICD-10-CM

## 2025-05-08 DIAGNOSIS — R26.89 IMPAIRED GAIT AND MOBILITY: ICD-10-CM

## 2025-05-08 DIAGNOSIS — R27.9 LACK OF COORDINATION: ICD-10-CM
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
## 2025-05-08 DIAGNOSIS — R26.89 IMPAIRMENT OF BALANCE: Primary | ICD-10-CM

## 2025-05-08 PROCEDURE — 99203 OFFICE O/P NEW LOW 30 MIN: CPT | Performed by: OPHTHALMOLOGY

## 2025-05-08 PROCEDURE — 97112 NEUROMUSCULAR REEDUCATION: CPT | Mod: GP | Performed by: PHYSICAL THERAPIST

## 2025-05-08 PROCEDURE — 92083 EXTENDED VISUAL FIELD XM: CPT | Performed by: OPHTHALMOLOGY

## 2025-05-08 ASSESSMENT — CUP TO DISC RATIO
OD_RATIO: 0.2
OS_RATIO: 0.4

## 2025-05-08 ASSESSMENT — EXTERNAL EXAM - RIGHT EYE: OD_EXAM: NORMAL

## 2025-05-08 ASSESSMENT — VISUAL ACUITY
OD_PH_SC: 20/20-1
METHOD: SNELLEN - LINEAR
OS_PH_SC: 20/20
OS_SC: 20/40+2
OD_SC: 20/50-2

## 2025-05-08 ASSESSMENT — TONOMETRY
OS_IOP_MMHG: 11
IOP_METHOD: TONOPEN APPLANATION
OD_IOP_MMHG: 10

## 2025-05-08 ASSESSMENT — SLIT LAMP EXAM - LIDS
COMMENTS: NORMAL
COMMENTS: NORMAL

## 2025-05-08 ASSESSMENT — EXTERNAL EXAM - LEFT EYE: OS_EXAM: NORMAL

## 2025-05-08 NOTE — PROGRESS NOTES
Assessment/Plan   Diagnoses and all orders for this visit:  History of traumatic brain injury  Vision disturbance  -     [facility] Visual Field - OU - Both Eyes  Intermittent double vision with full extraocular muscle movement, normal disc and retina  Vision should be better in new glasses, would defer any vision therapy, may continue to spontaneously improve  Re check in 3-4 months  or as needed for symptoms (RSVP: redness, sensitivity to light, vision loss, pain)

## 2025-05-08 NOTE — PROGRESS NOTES
Physical Therapy    Physical Therapy Treatment    Patient Name: Kike Conde  MRN: [id number]  Today's Date: 5/8/2025  Time Calculation  Start Time: 1105  Stop Time: 1200  Time Calculation (min): 55 min     PT Therapeutic Procedures Time Entry  Neuromuscular Re-Education Time Entry: 55       Visit number: 3  Insurance: Payor: [employer] / Plan: AET MEDICARE ASSURE / Product Type: *No Product type* /   Plan of Care Dates: 4/22/2025  Authorized visits: [place]  Primary diagnosis: Impairment of balance      Assessment:  Patient presents for follow-up PT session this date, eager to demonstrate current abilities and progress achieved by practicing HEP.  Patient tolerated all therapeutic activities well with min encouragement and cued concentration. Home exercises were reviewed with patient and spouse.  Patient demonstrated excellent recall and effort performing home program exercises.  Patient reminded to work on good form and completion of task for a sense of success v comparing current abilities to pre-injury skills.  This is very hard for patient.  He will continue to benefit from skilled PT to continue to improve strength, balance, gait, and overall functional mobility.      Plan: Continue per plan of care to improve coordination and balance during progressively challenging standing and functional activities.       Current Problem  Problem List Items Addressed This Visit           ICD-10-CM    Injury due to skiing accident V00.328A    Impaired gait and mobility R26.89    Impairment of balance - Primary R26.89    Lack of coordination R27.9         Subjective    Patient states he feels like his vision has been affected by his head injury and that he has a tunnel of poor, fuzzy vision in the middle of visual field.  He has started to wear reading glasses some of the time, as he feels they help him see.  No headaches or nausea.  Scheduled to see ophthalmologist this afternoon.    Precautions:   Moderate falls risk due  "to distractibility and coordination deficits.  Continues with muniz catheter - discussed availability of leg bags v bigger bag.    Pain   Denies pain today.    Objective   Outcome Measures:  None assessed this date    Treatments:  NEUROMUSCULAR REEDUCATION x 50 minutes  Provided manual facilitation for correct timing and accuracy of postural responses as needed during functional mobility and assessment.   Patient entered and exited clinic ambulating with modified independence and no device 50' x 2.   Gait check   - 30' x 2  HEP check:   - walking lunge, 30' x 4 with occasional CGA   - patient modified exercise to include hesitation at SLS prior to each lunge with big, low lunge - good form.  MODIFIED: - full smooth step through after each lunge, Step- lunge with hesitation/hold at lunge   - high knee hesitation walk with frequent CGA - 30' x 4    - patient modified exercises to include big step and lunge after each SLS - with good form   MODIFIED: high knee/marching walk without hesitation with smooth combo of movements   - high knee side stepping - 30' x 2 each way   - increased difficulty accurately loading on R and adducting L   - patient modified to SLS, side step lunge, hesitation then continue shift into contra SLS  MODIFIED:  Knee lift during lateral step, contra knee lift during step-to.  NO lunge, squat or hesitation at SLS.  NOTE:  modifications made in clinic are not corrections of technique used by patient, but clarification of skills and movements for each exercise and an attempt to carefully blend specific skills (SLS and long steps) into functional and well controlled movement patterns (sidestepping into bathtub, or stepping over a puddle).    OP EDUCATION: Instructed patient via verbal cues, tactile cues, and demonstration for proper form and technique of exercises.  Educated patient regarding purpose of all activities.   Discussed criteria for \"successful\" motor responses and progression of " challenge.   - Patient perseverates on pre/post morbid differences   - encouraged to compare motor control to other post injury abilities v premorbid abilities.    HOME EXERCISE PROGRAM:  - marching bridge with arms across chest - instructed to focus on pelvic stability and control while switching legs.    - walking lunge, high knee side stepping.    Goals:  Active       PT Problem       Motor control/coordination       Start:  04/24/25    Expected End:  07/21/25       Patient will demonstrate improved LE coordination as evidenced by symmetrical effortless heel to shin test in sitting and in supine to allow improved automatic motor control for more efficient functional mobility.    Patient will demonstrate independence and compliance on HEP with written and verbal cues as needed to maximize LE and core motor control.         Balance       Start:  04/24/25    Expected End:  07/21/25       Patient will demonstrate improved dynamic balance  as evidenced by symmetrical SLS of at least 20 seconds, and 12/12 on MiniBest Test balance scale to allow progression of safety and independence during higher level activities such as hiking on uneven terrain.    Patient will demonstrate improved confidence and insight into challenges as evidenced by ABC score of at least 1120 (70%).         Gait       Start:  04/24/25    Expected End:  07/21/25       Patient will demonstrate improved gait in complex environments as evidenced by 10/10 on MiniBest Test gait scale, and <10% difference between TUG and Cog TUG to allow improved automaticity and cognitive endurance during crowded community based activities like shopping, eating out, and working out in gym.         Patient Stated Goal 1       Start:  04/24/25    Expected End:  07/21/25       Regain strength and balance, address vision issues, think clearer.  (80 - 90% recovery from injury - self rated)

## 2025-05-09 ENCOUNTER — CLINICAL SUPPORT (OUTPATIENT)
Dept: PRIMARY CARE | Facility: CLINIC | Age: 70
End: 2025-05-09
Payer: MEDICARE
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
## 2025-05-10 LAB
BASOPHILS # BLD AUTO: 70 CELLS/UL (ref 0–200)
BASOPHILS NFR BLD AUTO: 0.9 %
EOSINOPHIL # BLD AUTO: 172 CELLS/UL (ref 15–500)
EOSINOPHIL NFR BLD AUTO: 2.2 %
ERYTHROCYTE [DISTWIDTH] IN BLOOD BY AUTOMATED COUNT: 13 % (ref 11–15)
HCT VFR BLD AUTO: 39.7 % (ref 38.5–50)
HGB BLD-MCNC: 12.9 G/DL (ref 13.2–17.1)
LYMPHOCYTES # BLD AUTO: 1568 CELLS/UL (ref 850–3900)
LYMPHOCYTES NFR BLD AUTO: 20.1 %
MCH RBC QN AUTO: 30.9 PG (ref 27–33)
MCHC RBC AUTO-ENTMCNC: 32.5 G/DL (ref 32–36)
MCV RBC AUTO: 95.2 FL (ref 80–100)
MONOCYTES # BLD AUTO: 655 CELLS/UL (ref 200–950)
MONOCYTES NFR BLD AUTO: 8.4 %
NEUTROPHILS # BLD AUTO: 5335 CELLS/UL (ref 1500–7800)
NEUTROPHILS NFR BLD AUTO: 68.4 %
PLATELET # BLD AUTO: 380 THOUSAND/UL (ref 140–400)
PMV BLD REES-ECKER: 9.2 FL (ref 7.5–12.5)
RBC # BLD AUTO: 4.17 MILLION/UL (ref 4.2–5.8)
WBC # BLD AUTO: 7.8 THOUSAND/UL (ref 3.8–10.8)

## 2025-05-12 ENCOUNTER — DOCUMENTATION (OUTPATIENT)
Dept: SPEECH THERAPY | Facility: CLINIC | Age: 70
End: 2025-05-12
Payer: MEDICARE

## 2025-05-12 ENCOUNTER — APPOINTMENT (OUTPATIENT)
Dept: SPEECH THERAPY | Facility: CLINIC | Age: 70
End: 2025-05-12
Payer: MEDICARE

## 2025-05-12 ENCOUNTER — TREATMENT (OUTPATIENT)
Dept: OCCUPATIONAL THERAPY | Facility: CLINIC | Age: 70
End: 2025-05-12
Payer: MEDICARE

## 2025-05-12 DIAGNOSIS — R41.841 COGNITIVE COMMUNICATION DEFICIT: ICD-10-CM
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
## 2025-05-12 DIAGNOSIS — R29.898 LUE WEAKNESS: ICD-10-CM

## 2025-05-12 DIAGNOSIS — M25.512 LEFT SHOULDER PAIN: ICD-10-CM

## 2025-05-12 DIAGNOSIS — M25.612 DECREASED ROM OF LEFT SHOULDER: Primary | ICD-10-CM

## 2025-05-12 PROCEDURE — 97110 THERAPEUTIC EXERCISES: CPT | Mod: GO

## 2025-05-12 ASSESSMENT — PAIN - FUNCTIONAL ASSESSMENT: PAIN_FUNCTIONAL_ASSESSMENT: 0-10

## 2025-05-12 ASSESSMENT — PAIN SCALES - GENERAL: PAINLEVEL_OUTOF10: 0 - NO PAIN

## 2025-05-12 NOTE — PROGRESS NOTES
"Occupational Therapy    Occupational Therapy Treatment    Name: Kike Conde  MRN: [id number]  : 1955  Date: 25      Time Calculation  Start Time: 1130  Stop Time: 1210  Time Calculation (min): 40 min        OT Therapeutic Procedures Time Entry  Therapeutic Exercise Time Entry: 38              Visit: 3  Anjum   25-25  Problem List Items Addressed This Visit           ICD-10-CM    Injury due to skiing accident V00.328A    Left shoulder pain M25.512    LUE weakness R29.898    Decreased ROM of left shoulder - Primary M25.612       Assessment:   Patient responded well to treatment session, demonstrated good carryover of  new TB exercises. Patient's L shoulder flexion increased to 120 degrees today vs 95 degrees at eval on 25.    Plan:   Continue with skilled OT POC with focus on maximizing UE function for ADL/IADL tasks.    Subjective   Patient agreeable to treatment session. \"Sorry we were late, we got stuck in traffic\"    Precautions:   STEADI: 7  Pain Assessment:  Pain Assessment  Pain Assessment: 0-10  0-10 (Numeric) Pain Score: 0 - No pain    Objective      L shoulder flexion: 120 degrees     Therapeutic Exercise: 38 mins  -Red TB exercises to B shoulders 2 sets of 10 each muscle movement:  shoulder extension, rows, external rotation, internal rotation, elbow flexion, extension. Cues given for proper technique.    OP EDUCATION:   Continue with HEP  Added in red TB exercises today-given in handout form    Goals:  Active       OT Goals       HEP       Start:  25    Expected End:  25       Patient will be independent with established HEP for the LUE to maximize function for ADL/IADL tasks          ROM       Start:  25    Expected End:  25       Patient will demonstrate >= 130 degrees of L shoulder flexion for functional reaching tasks and ADLS         Strength       Start:  25    Expected End:  25       Patient will increase L shoulder strength by 1/2 " muscle grade and L  strength by 10# or more for ADL/IADL tasks

## 2025-05-12 NOTE — PROGRESS NOTES
Speech-Language Pathology    { SLP ALL NOTES:[id number]}      Current Problem:        ICD-10-CM    Injury due to skiing accident V00.328A    Cognitive communication deficit R41.841       SLP Assessment:  SLP TX Intervention Outcome: Making Progress Towards Goals  Treatment Tolerance: Patient tolerated treatment well      Plan:  SLP TX Plan: Continue Plan of Care  SLP Frequency: 2x per week  Discussed POC: Patient  Discussed Risks/Benefits: Yes, Patient  Patient/Caregiver Agreeable: Yes  Next Session: executive functioning tasks (ex. medication management, planning/organization), word-finding, check in re: fatigue/stress management tool box  HEP: word-finding and language packet      Subjective   Patient arrived to and attended treatment accompanied by his wife. ***        General Visit Information:  Certification Period Start Date: 4/18/2025  Certification Period End Date: 7/17/2025  Number of Authorized Treatments : Based on [place] ([employer] MEDICARE, $40 CO PAY)  Total Number of Visits : 5      Pain Assessment:  Pain Assessment  Pain Assessment: 0-10  0-10 (Numeric) Pain Score: 0 - No pain      Objective     [place] Naming Test (BNT):  --Spontaneous correct responses: 45/60  --Stimulus cues given: 2  --Correct responses with stimulus cues: 0  --Phonemic cues give: 15  --Correct responses with phonemic cues: 13  --Multiple choice cues given: 4  --Correct responses with multiple choice cues: 4  *Patient noted to use gestures, delays, description, and related words independently to cue himself     GOALS: Start date: 4/18/2025 ; End/re-eval date: 7/17/2025  By discharge:  LTG:   Patient will independently and effectively communicate in valued conversations in the home and social settings using compensatory strategies as needed.   >GOAL STATUS: Ongoing; progressing    Patient will increase functional independence to participate in the home, community, and work environment.   >GOAL STATUS: Ongoing; progressing    STGs:    Patient will perform word retrieval tasks for vocabulary/topics/tasks identified as personally-relevant to the patient with 90% accuracy given minimal cueing.  >ADDRESSED: Patient completed generative naming tasks for functional topics, like kitchen items and cleaning products/tools. Patient completed task given moderate assistance. Episodes of word-finding issues were evident throughout, patient worked through these and was sometimes successful by using gestures, synonyms, and description independently. Continuation of HEP encouraged.    >GOAL STATUS: Ongoing; making progress  Patient will demonstrate knowledge and use of >= 2 compensatory strategies for impaired word recall and will independently utilize them in 4/5 opportunities at the discourse level to prevent communication breakdowns.  >ADDRESSED: During episodes of word-finding, patient often used gestures, synonyms, and description independently. These attempts were sometimes successful.   >GOAL STATUS: Ongoing; making progress  Patient will utilize compensatory strategies for memory in order to perform tasks relevant to the patient with 90% accuracy given minimal cueing.    >NOT ADDRESSED:   >GOAL STATUS: Ongoing; progressing    Patient will utilize compensatory strategies for executive functioning in order to perform tasks relevant to the patient with 90% accuracy given minimal cueing.    >ADDRESSED: Patient participated in certain subtests of the FSTAC , including medication management and grocery shopping. He utilized self-talk to complete activities. Patient's vision appeared adequate for reading, but he did report some difficulties making out the pictures in the grocery shopping activities. The instructions for the grocery task instructed the patient to look at the list (list items were random/not grouped by aisle) and plan the shortest route through the grocery store. Patient recited and understood those instructions, but did not execute them. He  "said that in his own life, he would be able to \"zip around\" the store and not have to follow those instructions. Time to complete the activity was mildly prolonged. Patient also with errors on medication management activity, specifically with the bottle label that said \"three times a day on Sun, Tues, Thurs.\" Some signs of impulsivity throughout the tasks, ex. disregarding pop-ups and closing them without reading them. Patient added 4 pills on the correct days.   >GOAL STATUS: Ongoing; progressing    Patient will utilize learned fatigue/frustration management strategies independently.    >NOT ADDRESSED:   >GOAL STATUS: Ongoing; making progress      Outpatient Education:  Adult Outpatient Education  Individual(s) Educated: Patient  Verbal Education : Feedback on session performance  Risk and Benefits Discussed with Patient/Caregiver/Other: yes  Patient/Caregiver Demonstrated Understanding: yes  Plan of Care Discussed and Agreed Upon: yes  Patient Response to Education: Patient/Caregiver Verbalized Understanding of Information, Patient/Caregiver Asked Appropriate Questions   "

## 2025-05-12 NOTE — PROGRESS NOTES
Speech-Language Pathology                 Therapy Communication Note    Patient Name: Kike Conde  MRN: [id number]  Today's Date: 5/12/2025     Discipline: Speech Language Pathology    Missed Visit Reason: Patient cancelled his scheduled Speech Therapy appointment this date.    Missed Time: Cancel

## 2025-05-14 NOTE — PREPROCEDURE INSTRUCTIONS
Current Medications    Medication Instructions    amantadine (Symmetrel) 100 mg capsule On hold until after surgery     aspirin 81 mg chewable tablet On hold until after surgery     carvedilol (Coreg) 6.25 mg tablet *Take morning of surgery with sip of water    chlorhexidine (Peridex) 0.12 % solution *Use morning of surgery    doxazosin (Cardura) 4 mg tablet *Take morning of surgery with sip of water    meloxicam (Mobic) 15 mg tablet On hold until after surgery     QUEtiapine (SEROquel) 50 mg tablet Continue until night before surgery     sulfamethoxazole-trimethoprim (Bactrim DS) 800-160 mg tablet Continue until night before surgery     traZODone (Desyrel) 100 mg tablet Continue until night before surgery          NPO Instructions:  Do not eat any food after midnight the night before your surgery.  You may have 13 ounces of clear liquids until TWO hours before ARRIVAL to hospital. This includes water, black tea/coffee, (no milk or cream) apple juice and electrolyte drinks (Gatorade).    Additional Instructions:     Friday May 16:  You will be contacted by Surgery Scheduling regarding the time of your arrival to facility and surgery time. If you have not received a call by 2:00 pm, please call [phone]    Day of Surgery:  Enter through the main entrance of [facility], located at [address] [facility]. Proceed to registration, located on the right hand side of the staircase. You will need your ID and insurance card for registration. Please ensure you have a responsible adult to drive you home.    Take a shower before your procedure. After your shower avoid lotions, powders, deodorants or anything applied to the skin. If you wear contacts or glasses, wear the glasses. If you do not have glasses, please bring a case for your contacts. You may wear hearing aids and dentures, bring a case for them or we will provide one. Make sure you wear something loose and comfortable. Keep in mind your surgical procedure  and wear something that will accommodate incisions or bandages. Please remove all jewelry and piercing's.     For further questions Kayla CHRIS can be contacted at [phone] between 7AM-3PM.

## 2025-05-15 ENCOUNTER — TREATMENT (OUTPATIENT)
Dept: OCCUPATIONAL THERAPY | Facility: CLINIC | Age: 70
End: 2025-05-15
Payer: MEDICARE

## 2025-05-15 ENCOUNTER — TREATMENT (OUTPATIENT)
Dept: PHYSICAL THERAPY | Facility: CLINIC | Age: 70
End: 2025-05-15
Payer: MEDICARE
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
## 2025-05-15 DIAGNOSIS — R42 DIZZINESS: ICD-10-CM

## 2025-05-15 DIAGNOSIS — R26.89 IMPAIRED GAIT AND MOBILITY: Primary | ICD-10-CM

## 2025-05-15 DIAGNOSIS — R26.89 IMPAIRMENT OF BALANCE: ICD-10-CM

## 2025-05-15 DIAGNOSIS — R29.898 LUE WEAKNESS: ICD-10-CM

## 2025-05-15 DIAGNOSIS — R27.9 LACK OF COORDINATION: ICD-10-CM

## 2025-05-15 DIAGNOSIS — M25.612 DECREASED ROM OF LEFT SHOULDER: Primary | ICD-10-CM

## 2025-05-15 DIAGNOSIS — M25.512 LEFT SHOULDER PAIN: ICD-10-CM

## 2025-05-15 PROCEDURE — 97110 THERAPEUTIC EXERCISES: CPT | Mod: GO

## 2025-05-15 PROCEDURE — 97112 NEUROMUSCULAR REEDUCATION: CPT | Mod: GP | Performed by: PHYSICAL THERAPIST

## 2025-05-15 ASSESSMENT — PAIN SCALES - GENERAL: PAINLEVEL_OUTOF10: 0 - NO PAIN

## 2025-05-15 ASSESSMENT — PAIN - FUNCTIONAL ASSESSMENT: PAIN_FUNCTIONAL_ASSESSMENT: 0-10

## 2025-05-15 NOTE — PROGRESS NOTES
"Physical Therapy    Physical Therapy Treatment    Patient Name: Kike Conde  MRN: [id number]  Today's Date: 5/15/2025  Time Calculation  Start Time: 0935  Stop Time: 1030  Time Calculation (min): 55 min     PT Therapeutic Procedures Time Entry  Neuromuscular Re-Education Time Entry: 55       Visit number: 4  Insurance: Payor: [employer] / Plan: [employer] MEDICARE ASSURE / Product Type: *No Product type* /   Plan of Care Dates: 4/22/2025  Authorized visits: [place]  Primary diagnosis: Impairment of balance    Assessment:  Patient presents for follow-up PT session this date, eager to demonstrate current abilities and progress achieved by practicing HEP.  Patient tolerated all therapeutic activities well with min encouragement and cued concentration.  Home exercises were reviewed with patient and spouse.  Patient demonstrated excellent recall and effort performing home program exercises.  He continues to progress balance and motor control during dynamic gait exercises with focus on smooth, complete position transfers v interrupting full motions and focusing on partial task quality.  This is very hard for patient and he benefits from cues and coaching from spouse.      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  Patient will be ordering prescribed glasses and is eager to progress his visual acuity and reduce his \"visual fuzziness\".  We initiated smooth pursuits, horizontal and vertical as a monocular exercises into HEP.  He will continue to benefit from skilled PT to continue to improve strength, balance, gait, and overall functional mobility.      Plan: Continue per plan of care to improve coordination and balance during progressively challenging standing and functional activities.  Initiate visual acuity  and conjugate gaze training.     Current Problem  Problem List Items Addressed This Visit           " ICD-10-CM    Injury due to skiing accident V00.328A    Impaired gait and mobility - Primary R26.89    Impairment of balance R26.89    Lack of coordination R27.9    Dizziness R42         Subjective    Patient states he feels like his vision has been affected by his head injury and that he has a tunnel of poor, fuzzy vision in the middle of visual field.  He has started to wear reading glasses some of the time, as he feels they help him see.  No headaches or nausea.  I have been working in the garden, and I think it's helping my balance.    Precautions:   Moderate falls risk due to distractibility and coordination deficits.  Continues with muniz catheter.  Dizzy    Pain   Denies pain today.    Objective   Outcome Measures:  None assessed this date    Treatments:  NEUROMUSCULAR REEDUCATION x 55 minutes  Provided manual facilitation for correct timing and accuracy of postural responses as needed during functional mobility and assessment.   Patient entered and exited clinic ambulating with modified independence and no device 50' x 2.   HEP check:   - walking lunge, 30' x 4 with occasional CGA  Reminded with purposeful practice in clinic: - full smooth step through after each lunge, Step- lunge with hesitation/hold at lunge   - high knee hesitation walk with frequent CGA - 30' x 4  Reminded with purposeful practice in clinic: high knee/marching walk without hesitation with smooth combo of movements   - high knee side stepping - 30' x 2 each way   - increased difficulty accurately loading on R and adducting L  Reminded with purposeful practice in clinic:  Knee lift during lateral step, contra knee lift during step-to.  NO lunge, squat or hesitation at SLS.  NOTE:  modifications made in clinic are not corrections of technique used by patient, but clarification of skills and movements for each exercise and an attempt to carefully blend specific skills (SLS and long steps) into functional and well controlled movement patterns  "(sidestepping into bathtub, or stepping over a puddle).  ADD to HEP:   - wall sits - even and staggered stance   - wall squats - even and staggered stance.    Visual training:  Attempting to focus on capital B on tip of tongue depressor held at arm length   - Binocular - unable to focus and fully lose fuzziness, static and horizontal smooth pursuits.   - Monocular -    R - fuzzy in all area for visual field and all distances from eye - 6 inches to arm's length.   L - fuzzy in all area, but able to focus best at 1/2 arm's length.  HEP: - Monocular smooth pursuits horizontal and vertical in sitting (in sitting).    - 4x horizontal,  4 x vertical with each eye.    - maintain distance from eye at position of clearest vision.    OP EDUCATION: Instructed patient via verbal cues, tactile cues, and demonstration for proper form and technique of exercises.  Educated patient regarding purpose of all activities.   Discussed criteria for \"successful\" motor responses and progression of challenge.   - Patient perseverates on pre/post morbid differences   - encouraged to compare motor control to other post injury abilities v premorbid abilities.    HOME EXERCISE PROGRAM:  - marching bridge with arms across chest - instructed to focus on pelvic stability and control while switching legs.    - walking lunge, high knee side stepping.   - wall sits - even and staggered stance   - wall squats - even and staggered stance.  Vision exercises as listed above    Goals:  Active       PT Problem       Motor control/coordination       Start:  04/24/25    Expected End:  07/21/25       Patient will demonstrate improved LE coordination as evidenced by symmetrical effortless heel to shin test in sitting and in supine to allow improved automatic motor control for more efficient functional mobility.    Patient will demonstrate independence and compliance on HEP with written and verbal cues as needed to maximize LE and core motor control.         " Balance       Start:  04/24/25    Expected End:  07/21/25       Patient will demonstrate improved dynamic balance  as evidenced by symmetrical SLS of at least 20 seconds, and 12/12 on MiniBest Test balance scale to allow progression of safety and independence during higher level activities such as hiking on uneven terrain.    Patient will demonstrate improved confidence and insight into challenges as evidenced by ABC score of at least 1120 (70%).         Gait       Start:  04/24/25    Expected End:  07/21/25       Patient will demonstrate improved gait in complex environments as evidenced by 10/10 on MiniBest Test gait scale, and <10% difference between TUG and Cog TUG to allow improved automaticity and cognitive endurance during crowded community based activities like shopping, eating out, and working out in gym.         Patient Stated Goal 1       Start:  04/24/25    Expected End:  07/21/25       Regain strength and balance, address vision issues, think clearer.  (80 - 90% recovery from injury - self rated)

## 2025-05-15 NOTE — PROGRESS NOTES
"Occupational Therapy    Occupational Therapy Treatment    Name: Kike Conde  MRN: [id number]  : 1955  Date: 05/15/25      Time Calculation  Start Time: 08  Stop Time: 930  Time Calculation (min): 45 min        OT Therapeutic Procedures Time Entry  Therapeutic Exercise Time Entry: 40              Visit: 4  Richardtfrandy   25-25  Problem List Items Addressed This Visit           ICD-10-CM    Injury due to skiing accident V00.328A    Left shoulder pain M25.512    LUE weakness R29.898    Decreased ROM of left shoulder - Primary M25.612         Assessment:   Patient responded well to treatment session, demonstrated good carryover of putty exercises. Patient's L   strength has improved by 11# since evaluation.     Plan:   Continue with skilled OT POC with focus on maximizing UE function for ADL/IADL tasks.    Subjective   Patient agreeable to treatment session. \"I've been doing my exercises.\"    Precautions:   STEADI: 7  Pain Assessment:  Pain Assessment  Pain Assessment: 0-10  0-10 (Numeric) Pain Score: 0 - No pain    Objective      5/15/25:  L shoulder flexion:  L : 21#    Therapeutic Exercise: 40 mins  -L gentle wrist extension stretch with supinated and pronated wrist, 5x 5 second holds    -Completed red putty exercises to the L hand for strengthening: digit flexion, digit extension, digit adduction, tip to tip pinch, lateral pinch, digit abduction. Cues given for proper technique.      OP EDUCATION:   Continue with HEP  Added in red putty exercises- given in handout form    Goals:  Active       OT Goals       HEP       Start:  25    Expected End:  25       Patient will be independent with established HEP for the LUE to maximize function for ADL/IADL tasks          ROM       Start:  25    Expected End:  25       Patient will demonstrate >= 130 degrees of L shoulder flexion for functional reaching tasks and ADLS         Strength       Start:  25    Expected End:  " 07/01/25       Patient will increase L shoulder strength by 1/2 muscle grade and L  strength by 10# or more for ADL/IADL tasks

## 2025-05-16 ENCOUNTER — HOSPITAL ENCOUNTER (OUTPATIENT)
Dept: RADIOLOGY | Facility: HOSPITAL | Age: 70
Discharge: HOME | End: 2025-05-16
Payer: MEDICARE

## 2025-05-16 ENCOUNTER — HOSPITAL ENCOUNTER (OUTPATIENT)
Dept: RADIOLOGY | Facility: HOSPITAL | Age: 70
End: 2025-05-16
Payer: MEDICARE

## 2025-05-16 DIAGNOSIS — I77.72 ILIAC ARTERY DISSECTION (MULTI): ICD-10-CM

## 2025-05-16 PROCEDURE — 2550000001 HC RX 255 CONTRASTS: Performed by: SURGERY

## 2025-05-16 PROCEDURE — 74174 CTA ABD&PLVS W/CONTRAST: CPT

## 2025-05-16 RX ADMIN — IOHEXOL 75 ML: 350 INJECTION, SOLUTION INTRAVENOUS at 14:30

## 2025-05-19 ENCOUNTER — ANESTHESIA EVENT (OUTPATIENT)
Dept: OPERATING ROOM | Facility: HOSPITAL | Age: 70
End: 2025-05-19
Payer: MEDICARE

## 2025-05-19 ENCOUNTER — PHARMACY VISIT (OUTPATIENT)
Dept: PHARMACY | Facility: CLINIC | Age: 70
End: 2025-05-19
Payer: COMMERCIAL

## 2025-05-19 ENCOUNTER — HOSPITAL ENCOUNTER (OUTPATIENT)
Dept: CARDIOLOGY | Facility: HOSPITAL | Age: 70
Setting detail: OUTPATIENT SURGERY
Discharge: HOME | End: 2025-05-19
Payer: MEDICARE

## 2025-05-19 ENCOUNTER — HOSPITAL ENCOUNTER (OUTPATIENT)
Facility: HOSPITAL | Age: 70
Setting detail: OUTPATIENT SURGERY
Discharge: HOME | End: 2025-05-19
Attending: STUDENT IN AN ORGANIZED HEALTH CARE EDUCATION/TRAINING PROGRAM | Admitting: STUDENT IN AN ORGANIZED HEALTH CARE EDUCATION/TRAINING PROGRAM
Payer: MEDICARE

## 2025-05-19 ENCOUNTER — ANESTHESIA (OUTPATIENT)
Dept: OPERATING ROOM | Facility: HOSPITAL | Age: 70
End: 2025-05-19
Payer: MEDICARE

## 2025-05-19 VITALS
HEIGHT: 72 IN | WEIGHT: 160 LBS | TEMPERATURE: 97 F | SYSTOLIC BLOOD PRESSURE: 123 MMHG | BODY MASS INDEX: 21.67 KG/M2 | RESPIRATION RATE: 16 BRPM | OXYGEN SATURATION: 97 % | DIASTOLIC BLOOD PRESSURE: 75 MMHG | HEART RATE: 60 BPM

## 2025-05-19 DIAGNOSIS — R33.9 URINARY RETENTION: Primary | ICD-10-CM

## 2025-05-19 DIAGNOSIS — N40.1 BPH WITH OBSTRUCTION/LOWER URINARY TRACT SYMPTOMS: ICD-10-CM

## 2025-05-19 DIAGNOSIS — N13.8 BPH WITH OBSTRUCTION/LOWER URINARY TRACT SYMPTOMS: ICD-10-CM

## 2025-05-19 LAB
POCT INTERNATIONAL NORMALIZATION RATIO: 1.1
POCT PROTHROMBIN TIME: 13.3 SECONDS

## 2025-05-19 PROCEDURE — 3700000002 HC GENERAL ANESTHESIA TIME - EACH INCREMENTAL 1 MINUTE: Performed by: STUDENT IN AN ORGANIZED HEALTH CARE EDUCATION/TRAINING PROGRAM

## 2025-05-19 PROCEDURE — 3700000001 HC GENERAL ANESTHESIA TIME - INITIAL BASE CHARGE: Performed by: STUDENT IN AN ORGANIZED HEALTH CARE EDUCATION/TRAINING PROGRAM

## 2025-05-19 PROCEDURE — 0753T DGTZ GLS MCRSCP SLD LEVEL IV: CPT | Mod: TC,PARLAB | Performed by: STUDENT IN AN ORGANIZED HEALTH CARE EDUCATION/TRAINING PROGRAM

## 2025-05-19 PROCEDURE — 7100000001 HC RECOVERY ROOM TIME - INITIAL BASE CHARGE: Performed by: STUDENT IN AN ORGANIZED HEALTH CARE EDUCATION/TRAINING PROGRAM

## 2025-05-19 PROCEDURE — 2500000005 HC RX 250 GENERAL PHARMACY W/O HCPCS: Performed by: NURSE ANESTHETIST, CERTIFIED REGISTERED

## 2025-05-19 PROCEDURE — A52601 PR TRANSURETHRAL ELEC-SURG PROSTATECTOM: Performed by: ANESTHESIOLOGY

## 2025-05-19 PROCEDURE — 7100000009 HC PHASE TWO TIME - INITIAL BASE CHARGE: Performed by: STUDENT IN AN ORGANIZED HEALTH CARE EDUCATION/TRAINING PROGRAM

## 2025-05-19 PROCEDURE — 7100000010 HC PHASE TWO TIME - EACH INCREMENTAL 1 MINUTE: Performed by: STUDENT IN AN ORGANIZED HEALTH CARE EDUCATION/TRAINING PROGRAM

## 2025-05-19 PROCEDURE — 93005 ELECTROCARDIOGRAM TRACING: CPT

## 2025-05-19 PROCEDURE — 52601 PROSTATECTOMY (TURP): CPT | Performed by: STUDENT IN AN ORGANIZED HEALTH CARE EDUCATION/TRAINING PROGRAM

## 2025-05-19 PROCEDURE — 2500000004 HC RX 250 GENERAL PHARMACY W/ HCPCS (ALT 636 FOR OP/ED): Mod: JZ | Performed by: STUDENT IN AN ORGANIZED HEALTH CARE EDUCATION/TRAINING PROGRAM

## 2025-05-19 PROCEDURE — 2500000004 HC RX 250 GENERAL PHARMACY W/ HCPCS (ALT 636 FOR OP/ED): Mod: JZ | Performed by: NURSE ANESTHETIST, CERTIFIED REGISTERED

## 2025-05-19 PROCEDURE — A52601 PR TRANSURETHRAL ELEC-SURG PROSTATECTOM: Performed by: NURSE ANESTHETIST, CERTIFIED REGISTERED

## 2025-05-19 PROCEDURE — RXMED WILLOW AMBULATORY MEDICATION CHARGE

## 2025-05-19 PROCEDURE — 2500000005 HC RX 250 GENERAL PHARMACY W/O HCPCS: Mod: JZ | Performed by: STUDENT IN AN ORGANIZED HEALTH CARE EDUCATION/TRAINING PROGRAM

## 2025-05-19 PROCEDURE — 93010 ELECTROCARDIOGRAM REPORT: CPT | Performed by: INTERNAL MEDICINE

## 2025-05-19 PROCEDURE — 7100000002 HC RECOVERY ROOM TIME - EACH INCREMENTAL 1 MINUTE: Performed by: STUDENT IN AN ORGANIZED HEALTH CARE EDUCATION/TRAINING PROGRAM

## 2025-05-19 PROCEDURE — 3600000008 HC OR TIME - EACH INCREMENTAL 1 MINUTE - PROCEDURE LEVEL THREE: Performed by: STUDENT IN AN ORGANIZED HEALTH CARE EDUCATION/TRAINING PROGRAM

## 2025-05-19 PROCEDURE — 2720000007 HC OR 272 NO HCPCS: Performed by: STUDENT IN AN ORGANIZED HEALTH CARE EDUCATION/TRAINING PROGRAM

## 2025-05-19 PROCEDURE — 3600000003 HC OR TIME - INITIAL BASE CHARGE - PROCEDURE LEVEL THREE: Performed by: STUDENT IN AN ORGANIZED HEALTH CARE EDUCATION/TRAINING PROGRAM

## 2025-05-19 RX ORDER — CHLORHEXIDINE GLUCONATE 40 MG/ML
SOLUTION TOPICAL DAILY PRN
Status: DISCONTINUED | OUTPATIENT
Start: 2025-05-19 | End: 2025-05-19 | Stop reason: HOSPADM

## 2025-05-19 RX ORDER — ACETAMINOPHEN 500 MG
1000 TABLET ORAL EVERY 6 HOURS PRN
Qty: 56 TABLET | Refills: 0 | Status: SHIPPED | OUTPATIENT
Start: 2025-05-19 | End: 2025-05-26

## 2025-05-19 RX ORDER — ONDANSETRON HYDROCHLORIDE 2 MG/ML
INJECTION, SOLUTION INTRAVENOUS AS NEEDED
Status: DISCONTINUED | OUTPATIENT
Start: 2025-05-19 | End: 2025-05-19

## 2025-05-19 RX ORDER — LIDOCAINE HCL/PF 100 MG/5ML
SYRINGE (ML) INTRAVENOUS AS NEEDED
Status: DISCONTINUED | OUTPATIENT
Start: 2025-05-19 | End: 2025-05-19

## 2025-05-19 RX ORDER — MIDAZOLAM HYDROCHLORIDE 1 MG/ML
INJECTION, SOLUTION INTRAMUSCULAR; INTRAVENOUS CONTINUOUS PRN
Status: DISCONTINUED | OUTPATIENT
Start: 2025-05-19 | End: 2025-05-19

## 2025-05-19 RX ORDER — SODIUM CHLORIDE 0.9 G/100ML
INJECTION, SOLUTION IRRIGATION AS NEEDED
Status: DISCONTINUED | OUTPATIENT
Start: 2025-05-19 | End: 2025-05-19 | Stop reason: HOSPADM

## 2025-05-19 RX ORDER — CEFAZOLIN SODIUM 2 G/50ML
2 SOLUTION INTRAVENOUS ONCE
Status: DISCONTINUED | OUTPATIENT
Start: 2025-05-19 | End: 2025-05-19 | Stop reason: HOSPADM

## 2025-05-19 RX ORDER — PROPOFOL 10 MG/ML
INJECTION, EMULSION INTRAVENOUS AS NEEDED
Status: DISCONTINUED | OUTPATIENT
Start: 2025-05-19 | End: 2025-05-19

## 2025-05-19 RX ORDER — NORETHINDRONE AND ETHINYL ESTRADIOL 0.5-0.035
KIT ORAL AS NEEDED
Status: DISCONTINUED | OUTPATIENT
Start: 2025-05-19 | End: 2025-05-19

## 2025-05-19 RX ORDER — FENTANYL CITRATE 50 UG/ML
INJECTION, SOLUTION INTRAMUSCULAR; INTRAVENOUS AS NEEDED
Status: DISCONTINUED | OUTPATIENT
Start: 2025-05-19 | End: 2025-05-19

## 2025-05-19 RX ORDER — IBUPROFEN 400 MG/1
400 TABLET, FILM COATED ORAL EVERY 6 HOURS PRN
Qty: 28 TABLET | Refills: 0 | Status: SHIPPED | OUTPATIENT
Start: 2025-05-19 | End: 2025-05-27

## 2025-05-19 RX ORDER — VANCOMYCIN HYDROCHLORIDE 1 G/200ML
1000 INJECTION, SOLUTION INTRAVENOUS ONCE
Status: COMPLETED | OUTPATIENT
Start: 2025-05-19 | End: 2025-05-19

## 2025-05-19 RX ORDER — CEFAZOLIN 1 G/1
INJECTION, POWDER, FOR SOLUTION INTRAVENOUS AS NEEDED
Status: DISCONTINUED | OUTPATIENT
Start: 2025-05-19 | End: 2025-05-19

## 2025-05-19 RX ADMIN — ONDANSETRON 4 MG: 2 INJECTION INTRAMUSCULAR; INTRAVENOUS at 12:17

## 2025-05-19 RX ADMIN — FENTANYL CITRATE 25 MCG: 50 INJECTION, SOLUTION INTRAMUSCULAR; INTRAVENOUS at 12:00

## 2025-05-19 RX ADMIN — LIDOCAINE HYDROCHLORIDE 100 MG: 20 INJECTION INTRAVENOUS at 11:14

## 2025-05-19 RX ADMIN — FENTANYL CITRATE 25 MCG: 50 INJECTION, SOLUTION INTRAMUSCULAR; INTRAVENOUS at 12:17

## 2025-05-19 RX ADMIN — CEFAZOLIN 2 G: 330 INJECTION, POWDER, FOR SOLUTION INTRAMUSCULAR; INTRAVENOUS at 11:20

## 2025-05-19 RX ADMIN — FENTANYL CITRATE 50 MCG: 50 INJECTION, SOLUTION INTRAMUSCULAR; INTRAVENOUS at 11:14

## 2025-05-19 RX ADMIN — PROPOFOL 200 MG: 10 INJECTION, EMULSION INTRAVENOUS at 11:14

## 2025-05-19 RX ADMIN — EPHEDRINE SULFATE 10 MG: 50 INJECTION, SOLUTION INTRAVENOUS at 11:37

## 2025-05-19 RX ADMIN — VANCOMYCIN HYDROCHLORIDE 1000 MG: 1 INJECTION, SOLUTION INTRAVENOUS at 11:30

## 2025-05-19 RX ADMIN — SODIUM CHLORIDE, POTASSIUM CHLORIDE, SODIUM LACTATE AND CALCIUM CHLORIDE: 600; 310; 30; 20 INJECTION, SOLUTION INTRAVENOUS at 11:09

## 2025-05-19 RX ADMIN — EPHEDRINE SULFATE 5 MG: 50 INJECTION, SOLUTION INTRAVENOUS at 11:31

## 2025-05-19 RX ADMIN — PROPOFOL 100 MG: 10 INJECTION, EMULSION INTRAVENOUS at 11:16

## 2025-05-19 RX ADMIN — DEXAMETHASONE SODIUM PHOSPHATE 4 MG: 4 INJECTION, SOLUTION INTRAMUSCULAR; INTRAVENOUS at 11:24

## 2025-05-19 SDOH — HEALTH STABILITY: MENTAL HEALTH: CURRENT SMOKER: 0

## 2025-05-19 ASSESSMENT — PAIN SCALES - GENERAL
PAIN_LEVEL: 0
PAINLEVEL_OUTOF10: 0 - NO PAIN

## 2025-05-19 ASSESSMENT — PAIN - FUNCTIONAL ASSESSMENT
PAIN_FUNCTIONAL_ASSESSMENT: 0-10

## 2025-05-19 ASSESSMENT — COLUMBIA-SUICIDE SEVERITY RATING SCALE - C-SSRS
2. HAVE YOU ACTUALLY HAD ANY THOUGHTS OF KILLING YOURSELF?: NO
1. IN THE PAST MONTH, HAVE YOU WISHED YOU WERE DEAD OR WISHED YOU COULD GO TO SLEEP AND NOT WAKE UP?: NO
6. HAVE YOU EVER DONE ANYTHING, STARTED TO DO ANYTHING, OR PREPARED TO DO ANYTHING TO END YOUR LIFE?: NO

## 2025-05-19 NOTE — ANESTHESIA PREPROCEDURE EVALUATION
Patient: Kike Conde    Procedure Information       Date/Time: 05/19/25 0910    Procedure: TRANSURETHRAL PROSTATE RESECTION    Location: [facility] 02 / [facility]    Surgeons: Esdras Pena MD            Relevant Problems   Anesthesia (within normal limits)      Neuro   (+) Cervical radiculopathy   (+) Right lumbar radiculopathy      /Renal   (+) BPH (benign prostatic hyperplasia)   (+) BPH with obstruction/lower urinary tract symptoms      HEENT   (+) Bilateral asymmetric sensorineural hearing loss       Clinical information reviewed:   Tobacco  Allergies  Meds   Med Hx  Surg Hx   Fam Hx  Soc Hx        NPO Detail:  NPO/Void Status  Date of Last Liquid: 05/19/25  Time of Last Liquid: 0530 (black coffee and water)  Date of Last Solid: 05/18/25  Time of Last Solid: 2100         Physical Exam    Airway  Mallampati: II  TM distance: >3 FB  Neck ROM: full  Mouth opening: 3 or more finger widths     Cardiovascular   Rhythm: regular     Dental - normal exam     Pulmonary    Abdominal            Anesthesia Plan    History of general anesthesia?: yes  History of complications of general anesthesia?: no    ASA 3     general     The patient is not a current smoker.  Patient was not previously instructed to abstain from smoking on day of procedure.  Patient did not smoke on day of procedure.    intravenous induction   Anesthetic plan and risks discussed with patient.  Use of blood products discussed with patient who.

## 2025-05-19 NOTE — ANESTHESIA POSTPROCEDURE EVALUATION
Patient: Kike Conde    Procedure Summary       Date: 05/19/25 Room / Location: [facility] / [facility]    Anesthesia Start: 1109 Anesthesia Stop:     Procedure: TRANSURETHRAL PROSTATE RESECTION Diagnosis:       Urinary retention      BPH with obstruction/lower urinary tract symptoms      (Urinary retention [R33.9])      (BPH with obstruction/lower urinary tract symptoms [N40.1, N13.8])    Surgeons: Esdras Pena MD Responsible Provider: Ronny Nova MD    Anesthesia Type: general ASA Status: 3            Anesthesia Type: general    Vitals Value Taken Time   /81 05/19/25 12:34   Temp 36.1 05/19/25 12:34   Pulse 62 05/19/25 12:34   Resp 18 05/19/25 12:34   SpO2 98 % 05/19/25 12:32       Anesthesia Post Evaluation    Patient location during evaluation: PACU  Patient participation: complete - patient participated  Level of consciousness: sleepy but conscious  Pain score: 0  Pain management: adequate  Airway patency: patent  Cardiovascular status: acceptable  Respiratory status: acceptable  Hydration status: acceptable  Postoperative Nausea and Vomiting: none        There were no known notable events for this encounter.

## 2025-05-19 NOTE — DISCHARGE INSTRUCTIONS
DEPARTMENT OF UROLOGY  DISCHARGE INSTRUCTIONS [facility]  Outpatient Surgery    C O N F I D E N T I A L   I N F O R M A T I O N    Kike Pena's contact information:  - Nurse line (clinical questions):  [phone]  - Admin line (scheduling questions):  [phone]    Call [phone] after 5:00 pm and ask for the Urology resident with any questions or concerns.      If it is a life-threatening situation, proceed to the nearest emergency department.        Follow-up appointment:    Future Appointments   Date Time Provider Department [place]   5/22/2025  9:00 AM Dajulianne Villegasc, PT [facility]   5/22/2025 10:15 AM Sagrario Grimm, OT [facility]   5/27/2025  9:30 AM Casandra Kumar, SLP [facility]   5/27/2025 11:30 AM Esdras Pena MD [id number] [place]   5/29/2025  9:45 AM Dajulianne Villegasc, PT [facility] [place]   5/29/2025 10:30 AM Sagrario Grimm, OT [facility]   6/2/2025  8:40 AM Keira Garrett MD SCCGEAPHY [facility]   6/5/2025 10:15 AM Akira M Burdharleyl, PT [facility] [place]   6/10/2025  9:30 AM Akira M Burdsall, PT [facility] [place]   6/12/2025 10:15 AM Akira M Sophiasall, PT [facility] [place]   6/16/2025 11:15 AM Akira M Burdsall, PT [facility]   6/19/2025 10:15 AM Akira M Burdsall, PT [facility] [place]   6/20/2025 10:00 AM Sumi Jovel PA-C [id number] [place]   6/23/2025 10:15 AM Akira M Burdsall, PT [facility] [place]   6/26/2025 10:15 AM Akira M Sophiasall, PT [facility]   8/19/2025  2:00 PM Esdras Rose MD PhD [id number] [place]   8/25/2025  9:00 AM Siomara Wynn MD [id number] [facility]          Thank you for the opportunity to care for you today.  Your health and healing are very important to us.  We hope we made you feel as comfortable as possible and are committed to your recovery and continued well-being.      The following is a brief overview of your transurethral prostate resection today. Some of the information contained on this summary may be  confidential.  This information should be kept in your records and should be shared with your regular doctor.    Physicians:   Dr. Pena      Procedure performed: Prostate Resection  Pending results:   pathology of tissue taken from your prostate    What to Expect During your Recovery and Home Care  Anesthesia Side Effects   You received anesthesia today.  You may feel sleepy, tired, or have a sore throat.   You may also feel drowsiness, dizziness, or inability to think clearly.  For your safety, do not drive, drink alcoholic beverages, take any unprescribed medication or make any important decisions for 24 hours.  A responsible adult should be with you for 24 hours.        Activity and Recovery    No heavy lifting over ten pounds. Limit activity while urinary catheter is in place. Avoid activities that would cause pulling or tugging on your catheter.    Do not drive or operate heavy machinery while taking narcotic pain medications as these medications can alter perception, impair judgement, and slow reaction times.    Pain Control  Unfortunately, you may experience pain after your procedure.  Adequate management can include alternative measures to help ease your pain and can include over the counter Tylenol. Do not take more than 4,000mg of Tylenol in a 24-hour period.      You may also experience bladder spasms due to the catheter. Ditropan may be prescribed to help alleviate this problem.    Nausea/Vomiting   Clear liquids are best tolerated at first. Start slow, advance your diet as tolerated to normal foods. Avoid spicy, greasy, heavy foods at first. Also, you may feel nauseous or like you need to vomit if you take any type of medication on an empty stomach.  Call your physician if you are unable to eat or drink and have persistent vomiting.    Signs of Bleeding   You are going to have some blood in your urine. Your urine will be light pink to yellow. You always want to look at the urine in the tubing of  your catheter and not in the large urine collection bag to check for bleeding. If urine becomes thick dark alirio red, has large clots or stops draining, please notify your physician.    Treatment/wound care:   Keep area(s) clean and dry. Clean around the tip or your penis were the catheter goes in daily with mild soap and water.  It is okay to shower 24 hours after time of surgery.    Do not submerge your catheter in standing water until seen for follow up appointment (no tub bathing, swimming, or hot tubs).      Signs of Infection  Signs of infection can include fever, drainage(green/yellow), chills, burning sensation with passing of urine, catheter leakage, or severe abdominal pain.  If you see any of these occur, please contact your doctor's office at [phone].  Any fever higher than 100.4, especially if associated with an ill feeling, abdominal pain, chills, or nausea should be reported to your surgeon.      Assist in bowel movements/urination  Increase fiber in diet  Increase water (6 to 8 glasses)  Increase walking     Additional Instructions: CATHETER CARE  Always keep the catheters tubing and drainage bag below the level of your bladder.  Avoid loops and kinks in the catheter tubing.  NOTIFY your physician if catheter falls out or catheter seems clogged and urine is not draining.   Do not wear the small leg bag to bed you should be provided with a larger overnight bag that you should wear to bed and can hang over the side of the bed.  We recommend wearing the large bag in the shower, as this is easy to dry, and you do not get your leg straps wet from your leg bag.   Your catheter should be secured to your upper thigh, do not allow it to hang or dangle.  Your catheter will be removed at your post-operative appointment.

## 2025-05-19 NOTE — OP NOTE
TRANSURETHRAL PROSTATE RESECTION Operative Note     Date: 2025  OR Location: [facility]    Name: Kike Conde, : 1955, Age: 69 y.o., MRN: [id number], Sex: male    Diagnosis  Pre-op Diagnosis      * Urinary retention [R33.9]     * BPH with obstruction/lower urinary tract symptoms [N40.1, N13.8] Post-op Diagnosis     * Urinary retention [R33.9]     * BPH with obstruction/lower urinary tract symptoms [N40.1, N13.8]     Procedures  Transurethral resection of prostate    Surgeons      * Esdras Pena - Primary    Resident/Fellow/Other Assistant:  Surgeons and Role:     * Cora Stockton MD - Resident - Assisting    Staff:   Circulator: Natacha Adam Person: Nidia  Circulator: Ema    Anesthesia Staff: Anesthesiologist: Ronny Nova MD  CRNA: DANA Olivier-CRNA    Procedure Summary  Anesthesia: General  ASA: III  Estimated Blood Loss: 5mL  Intra-op Medications:   Administrations occurring from 0910 to 1120 on 25:   Medication Name Total Dose   ceFAZolin (Ancef) vial 1 g 2 g   fentaNYL (Sublimaze) injection 50 mcg/mL 50 mcg   LR bolus Cannot be calculated   lidocaine (cardiac) injection 2% prefilled syringe 100 mg   propofol (Diprivan) injection 10 mg/mL 300 mg              Anesthesia Record               Intraprocedure I/O Totals          Intake    LR bolus 750.00 mL    vancomycin (Vancocin) 1,000 mg in dextrose 5%  mL 200.00 mL    Total Intake 950 mL          Specimen:   ID Type Source Tests Collected by Time   1 : PROSTATE CHIPS Tissue PROSTATE TURP SURGICAL PATHOLOGY EXAM Esdras Pena MD 2025 1146       Drains and/or Catheters:   22Fr latex 3-way Jarvis with 30ml in balloon on light traction, inflow capped    Findings:  Large median lobe with significant intravesical extension.    Wide opening of channel after full resection with median lobe and bilobar lateral lobes.    22 Albanian three-way Jarvis catheter placed with clear urine on placement.    Indications:  Kike Conde is an 69 y.o. male who is having surgery for Urinary retention [R33.9]  BPH with obstruction/lower urinary tract symptoms [N40.1, N13.8].     The patient was seen in the preoperative area. The risks, benefits, complications, treatment options, non-operative alternatives, expected recovery and outcomes were discussed with the patient. The possibilities of reaction to medication, pulmonary aspiration, injury to surrounding structures, bleeding, recurrent infection, the need for additional procedures, failure to diagnose a condition, and creating a complication requiring transfusion or operation were discussed with the patient. The patient concurred with the proposed plan, giving informed consent.  The site of surgery was properly noted/marked if necessary per policy. The patient has been actively warmed in preoperative area. Preoperative antibiotics have been ordered and given within 1 hours of incision. Venous thrombosis prophylaxis have been ordered including bilateral sequential compression devices    Procedure Details:   After obtaining informed consent, the patient was brought to the operating room, placed on the operating table in supine position.  Preoperative time-out was performed.  Preoperative urine cultures were reviewed.  The patient received preoperative antibiotics. General anesthesia was then obtained, and the patient was repositioned into the dorsal lithotomy position and prepped and draped in usual sterile fashion.      Cystoscopy was performed using 21 Maldivian cystoscope which revealed UOs in orthotopic position with inflammatory changes secondary to catheter use and large median lobe.  Rest of cystoscopy was unremarkable.    A 26-Maldivian resectoscope using a visual obturator was inserted into the bladder via the urethra atraumatically.  Pan-cystoscopy was performed.  The bladder had no masses or stones. The UOs were noted in normal orthotopic position.  Attention was then returned to  the prostate.  We then used a bipolar loop to resect the prostatic adenoma, starting with the median lobe.  Care was taken to avoid resecting the areas by the ureteral orifice which were protected throughout our resection. Then, we systematically resected the left, then the right lateral lobes.  Hemostasis was then obtained using the coagulation function of the bipolar loop.  At the end of the procedure, there was a TUR defect which was an excellent urinary channel that was open.  Hemostasis was achieved. Prostate chips were evacuated using the sheath of the resectoscope, and again any further hemostasis was obtained using the coagulation function of the bipolar loop.  At the conclusion of the procedure, a 22f 3-way Jarvis catheter was then placed into the bladder with return of clear urine.      The patient was awoken from general anesthesia and transferred to the PACU in stable condition.    Evidence of Infection: No   Complications:  None; patient tolerated the procedure well.    Disposition: PACU - hemodynamically stable.  Condition: stable     Additional Details:   Jarvis out 5/27  Follow up on pathology    Attending Attestation: I was present and scrubbed for the key portions of the procedure.    Esdras Pena  Phone Number: [phone]

## 2025-05-20 ASSESSMENT — PAIN SCALES - GENERAL: PAINLEVEL_OUTOF10: 0 - NO PAIN

## 2025-05-21 ENCOUNTER — OFFICE VISIT (OUTPATIENT)
Dept: NEUROLOGY | Facility: HOSPITAL | Age: 70
End: 2025-05-21
Payer: MEDICARE

## 2025-05-21 VITALS
RESPIRATION RATE: 17 BRPM | HEIGHT: 72 IN | DIASTOLIC BLOOD PRESSURE: 64 MMHG | HEART RATE: 61 BPM | SYSTOLIC BLOOD PRESSURE: 111 MMHG | WEIGHT: 160.05 LBS | BODY MASS INDEX: 21.68 KG/M2
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
## 2025-05-21 DIAGNOSIS — S06.2X9D DIFFUSE BRAIN INJURY WITH LOSS OF CONSCIOUSNESS, SUBSEQUENT ENCOUNTER: ICD-10-CM

## 2025-05-21 DIAGNOSIS — S06.9X9D TRAUMATIC BRAIN INJURY WITH LOSS OF CONSCIOUSNESS, SUBSEQUENT ENCOUNTER: ICD-10-CM

## 2025-05-21 DIAGNOSIS — G31.84 MILD COGNITIVE IMPAIRMENT: Primary | ICD-10-CM

## 2025-05-21 PROCEDURE — 99214 OFFICE O/P EST MOD 30 MIN: CPT | Mod: GC | Performed by: STUDENT IN AN ORGANIZED HEALTH CARE EDUCATION/TRAINING PROGRAM

## 2025-05-21 ASSESSMENT — PAIN SCALES - GENERAL: PAINLEVEL_OUTOF10: 0-NO PAIN

## 2025-05-21 NOTE — PROGRESS NOTES
Physical Therapy    Physical Therapy Treatment    Patient Name: Kike Conde  MRN: [id number]  Today's Date: 5/22/2025  Time Calculation  Start Time: 0901  Stop Time: 0946  Time Calculation (min): 45 min     PT Therapeutic Procedures Time Entry  Neuromuscular Re-Education Time Entry: 40       Visit number: 5  Insurance: Payor: [employer] / Plan: [employer] MED[employer]ARE ASSURE / Product Type: *No Product type* /   Plan of Care Dates: 4/22/2025  Authorized visits: [place]  Primary diagnosis: Impairment of balance    Assessment:  Patient has been cleared by vascular and neurology to initiate aerobic exercise, wife will message vascular regarding level of aerobic intensity allowed. Time was spent at length discussing cause of lightheadedness/dizziness with positional changes , orthostatic hypotension, along with importance of increasing fluid intake to  oz per day and performing mm pump exercises to allow for blood flow from the dependent position. Performed monocular vision exercises as noted below, patient with report of fuzzy/blurry vision at end range of eye motion. He will continue to benefit from skilled PT to continue to improve vision functioning, balance, gait, and overall functional mobility.      Plan: Continue per plan of care to improve coordination and balance during progressively challenging standing and functional activities.  Initiate visual acuity  and conjugate gaze training.     Current Problem  Problem List Items Addressed This Visit           ICD-10-CM    Injury due to skiing accident V00.328A    Impaired gait and mobility R26.89    Impairment of balance - Primary R26.89       Subjective    Patient states he feels that his blurry vision and decreased endurance are the greatest barriers. He notes that his balance varies a lot; panda due to the vision as he feels off. Patient and wife state that Vascular Doctor cleared for aerobic exercise - wife to check regarding the intensity allowed.     Precautions:  "  Moderate falls risk due to distractibility and coordination deficits.  Continues with muniz catheter.  Dizzy/Lightheadedness with positional changes    Pain    0/10 - no pain    Objective     Treatments:  NEUROMUSCULAR REEDUCATION x 40 minutes  - Seated diaphragmatic breathing   - Educated patient on importance of nutrition/hydration and the recommended daily amount of fluid being half of one's body weight in ounces  - Educated patient on orthostatic hypotension and method to improve blood flow such as heel-toe raises and marches; bending down and raising slowly  - Education on current evidence based research on moderate-high intensity aerobic training to allow for BDNF and neuroplasticity for patient's with TBI - patient's wife to contact Vascular doctor regarding weather or not there is a limitation of the aerobic intensity as he was just cleared for aerobic exercise   - Monocular - horizontal and vertical smooth pursuits x 20 each R/L   - Monocular - horizontal and vertical saccades x 20 each R/L   * R eye with medial-lateral deviation vertically   * Subjective report of hazy/blurry at end range bilaterally   - Monocular - pencil push-up x 10 R/L   * R eye decreased convergence compared to L    OP EDUCATION: Instructed patient via verbal cues, tactile cues, and demonstration for proper form and technique of exercises.  Educated patient regarding purpose of all activities.   Discussed criteria for \"successful\" motor responses and progression of challenge.   - Patient perseverates on pre/post morbid differences   - encouraged to compare motor control to other post injury abilities v premorbid abilities.    HOME EXERCISE PROGRAM:  - marching bridge with arms across chest - instructed to focus on pelvic stability and control while switching legs.    - walking lunge, high knee side stepping.   - wall sits - even and staggered stance   - wall squats - even and staggered stance.    Access Code: [email or web address]  URL: " [email or web address]  Date: 05/22/2025  Prepared by: Bharath Burnham    Exercises  - Seated Diaphragmatic Breathing  - 1 x daily - 7 x weekly - 1 sets - 10 reps  - Supine Diaphragmatic Breathing  - 1 x daily - 7 x weekly - 1 sets - 10 reps  - Seated Horizontal Saccades  - 1 x daily - 7 x weekly - 1 sets - 10 reps  - Seated Vertical Saccades  - 1 x daily - 7 x weekly - 1 sets - 10 reps  - Seated Horizontal Smooth Pursuit  - 1 x daily - 7 x weekly - 1 sets - 10 reps  - Seated Vertical Smooth Pursuit  - 1 x daily - 7 x weekly - 1 sets - 10 reps  - Pencil Pushups  - 1 x daily - 7 x weekly - 1 sets - 10 reps    Goals:  Active       PT Problem       Motor control/coordination       Start:  04/24/25    Expected End:  07/21/25       Patient will demonstrate improved LE coordination as evidenced by symmetrical effortless heel to shin test in sitting and in supine to allow improved automatic motor control for more efficient functional mobility.    Patient will demonstrate independence and compliance on HEP with written and verbal cues as needed to maximize LE and core motor control.         Balance       Start:  04/24/25    Expected End:  07/21/25       Patient will demonstrate improved dynamic balance  as evidenced by symmetrical SLS of at least 20 seconds, and 12/12 on MiniBest Test balance scale to allow progression of safety and independence during higher level activities such as hiking on uneven terrain.    Patient will demonstrate improved confidence and insight into challenges as evidenced by ABC score of at least 1120 (70%).         Gait       Start:  04/24/25    Expected End:  07/21/25       Patient will demonstrate improved gait in complex environments as evidenced by 10/10 on MiniBest Test gait scale, and <10% difference between TUG and Cog TUG to allow improved automaticity and cognitive endurance during crowded community based activities like shopping, eating out, and working out in gym.          Patient Stated Goal 1       Start:  04/24/25    Expected End:  07/21/25       Regain strength and balance, address vision issues, think clearer.  (80 - 90% recovery from injury - self rated)
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
## 2025-05-21 NOTE — PATIENT INSTRUCTIONS
Mr. Conde,    You were seen for cognitive impairment after a traumatic brain injury. We will refer you to Neuropsychology for further testing and cognitive rehabilitation. Please contact us at [phone] if you have any questions or concerns. Please return to clinic in 6 months.

## 2025-05-22 ENCOUNTER — TREATMENT (OUTPATIENT)
Dept: OCCUPATIONAL THERAPY | Facility: CLINIC | Age: 70
End: 2025-05-22
Payer: MEDICARE

## 2025-05-22 ENCOUNTER — TREATMENT (OUTPATIENT)
Dept: PHYSICAL THERAPY | Facility: CLINIC | Age: 70
End: 2025-05-22
Payer: MEDICARE

## 2025-05-22 DIAGNOSIS — M25.612 DECREASED ROM OF LEFT SHOULDER: Primary | ICD-10-CM

## 2025-05-22 DIAGNOSIS — R26.89 IMPAIRED GAIT AND MOBILITY: ICD-10-CM

## 2025-05-22 DIAGNOSIS — R29.898 LUE WEAKNESS: ICD-10-CM

## 2025-05-22 DIAGNOSIS — R26.89 IMPAIRMENT OF BALANCE: Primary | ICD-10-CM
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
## 2025-05-22 DIAGNOSIS — M25.512 LEFT SHOULDER PAIN: ICD-10-CM

## 2025-05-22 PROCEDURE — 97110 THERAPEUTIC EXERCISES: CPT | Mod: GO

## 2025-05-22 PROCEDURE — 97112 NEUROMUSCULAR REEDUCATION: CPT | Mod: GP

## 2025-05-22 ASSESSMENT — PAIN - FUNCTIONAL ASSESSMENT: PAIN_FUNCTIONAL_ASSESSMENT: 0-10

## 2025-05-22 ASSESSMENT — PAIN SCALES - GENERAL: PAINLEVEL_OUTOF10: 0 - NO PAIN

## 2025-05-22 NOTE — PROGRESS NOTES
Occupational Therapy    Occupational Therapy Treatment    Name: Kike Conde  MRN: [id number]  : 1955  Date: 25      Time Calculation  Start Time: 0950  Stop Time: 1030  Time Calculation (min): 40 min        OT Therapeutic Procedures Time Entry  Therapeutic Exercise Time Entry: 38              Visit: Maggi Valero   25-25  Problem List Items Addressed This Visit           ICD-10-CM    Injury due to skiing accident V00.328A    Left shoulder pain M25.512    LUE weakness R29.898    Decreased ROM of left shoulder - Primary M25.612         Assessment:   Patient responded well to treatment session, reporting less pain overall in the L shoulder. Patient also reported feeling overwhelmed with all the exercises he has to do. I recommended patient and spouse put PT/OT/SLP exercises on a calendar to be more organized and feel less overwhelmed on what exercises patient has or hasn't done.    Plan:   Continue with skilled OT POC with focus on maximizing UE function for ADL/IADL tasks.    Subjective   Patient agreeable to treatment session. No new issues to report.     Precautions:   STEADI: 7  Pain Assessment:  Pain Assessment  Pain Assessment: 0-10  0-10 (Numeric) Pain Score: 0 - No pain    Objective      25:  L shoulder flexion: 130 degrees  L : 21#    Therapeutic Exercise: 38 mins  -Cane exercises in supine 2 sets of 10 each: shoulder flexion, shoulder protraction, external rotation. Cues given for proper technique  -In supine completed shoulder press with 2# hand weights 2 sets of 10    OP EDUCATION:   Continue with HEP  Added in supine cane exercises and light dumbbell exercises -given in handout form       Goals:  Active       OT Goals       HEP       Start:  25    Expected End:  25       Patient will be independent with established HEP for the LUE to maximize function for ADL/IADL tasks          ROM       Start:  25    Expected End:  25       Patient will demonstrate  >= 130 degrees of L shoulder flexion for functional reaching tasks and ADLS         Strength       Start:  04/22/25    Expected End:  07/01/25       Patient will increase L shoulder strength by 1/2 muscle grade and L  strength by 10# or more for ADL/IADL tasks

## 2025-05-23 ENCOUNTER — APPOINTMENT (OUTPATIENT)
Dept: NEUROLOGY | Facility: HOSPITAL | Age: 70
End: 2025-05-23
Payer: MEDICARE

## 2025-05-23 NOTE — H&P
SUBJECTIVE:     69M with retention presenting for TURP.    OBJECTIVE:     Vitals:    05/19/25 1345   BP: 123/75   Pulse: 60   Resp: 16   Temp:    SpO2: 97%       General:  No acute distress  HEENT:  EOMI  CV:  Regular rate  Pulm:  Nonlabored respirations  Abd:  Soft, non-distended  :  Jarvis in place  MSK:  No contractures  Neuro:  Motor intact  Psych:  Appropriate affect    ASSESSMENT/PLAN:     To OR for TURP

## 2025-05-24 LAB
ATRIAL RATE: 57 BPM
P AXIS: 67 DEGREES
P OFFSET: 181 MS
P ONSET: 126 MS
PR INTERVAL: 162 MS
Q ONSET: 207 MS
QRS COUNT: 9 BEATS
QRS DURATION: 94 MS
QT INTERVAL: 442 MS
QTC CALCULATION(BAZETT): 430 MS
QTC FREDERICIA: 434 MS
R AXIS: -41 DEGREES
T AXIS: 31 DEGREES
T OFFSET: 428 MS
VENTRICULAR RATE: 57 BPM

## 2025-05-27 ENCOUNTER — TREATMENT (OUTPATIENT)
Dept: SPEECH THERAPY | Facility: CLINIC | Age: 70
End: 2025-05-27
Payer: MEDICARE

## 2025-05-27 ENCOUNTER — APPOINTMENT (OUTPATIENT)
Dept: UROLOGY | Facility: CLINIC | Age: 70
End: 2025-05-27
Payer: MEDICARE

## 2025-05-27 VITALS
SYSTOLIC BLOOD PRESSURE: 122 MMHG | WEIGHT: 161.4 LBS | HEIGHT: 72 IN | BODY MASS INDEX: 21.86 KG/M2 | TEMPERATURE: 97.1 F | HEART RATE: 66 BPM | DIASTOLIC BLOOD PRESSURE: 85 MMHG

## 2025-05-27 DIAGNOSIS — F19.11 OTHER PSYCHOACTIVE SUBSTANCE ABUSE, IN REMISSION: ICD-10-CM

## 2025-05-27 DIAGNOSIS — R41.841 COGNITIVE COMMUNICATION DEFICIT: ICD-10-CM
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
## 2025-05-27 DIAGNOSIS — N13.8 BPH WITH OBSTRUCTION/LOWER URINARY TRACT SYMPTOMS: ICD-10-CM

## 2025-05-27 DIAGNOSIS — R33.9 URINARY RETENTION: Primary | ICD-10-CM

## 2025-05-27 DIAGNOSIS — N40.1 BPH WITH OBSTRUCTION/LOWER URINARY TRACT SYMPTOMS: ICD-10-CM

## 2025-05-27 DIAGNOSIS — J96.01 ACUTE HYPOXIC RESPIRATORY FAILURE: ICD-10-CM

## 2025-05-27 PROCEDURE — 1036F TOBACCO NON-USER: CPT | Performed by: STUDENT IN AN ORGANIZED HEALTH CARE EDUCATION/TRAINING PROGRAM

## 2025-05-27 PROCEDURE — 3008F BODY MASS INDEX DOCD: CPT | Performed by: STUDENT IN AN ORGANIZED HEALTH CARE EDUCATION/TRAINING PROGRAM

## 2025-05-27 PROCEDURE — 1160F RVW MEDS BY RX/DR IN RCRD: CPT | Performed by: STUDENT IN AN ORGANIZED HEALTH CARE EDUCATION/TRAINING PROGRAM

## 2025-05-27 PROCEDURE — 97129 THER IVNTJ 1ST 15 MIN: CPT | Mod: GN

## 2025-05-27 PROCEDURE — 1159F MED LIST DOCD IN RCRD: CPT | Performed by: STUDENT IN AN ORGANIZED HEALTH CARE EDUCATION/TRAINING PROGRAM

## 2025-05-27 PROCEDURE — 97130 THER IVNTJ EA ADDL 15 MIN: CPT | Mod: GN

## 2025-05-27 PROCEDURE — 99024 POSTOP FOLLOW-UP VISIT: CPT | Performed by: STUDENT IN AN ORGANIZED HEALTH CARE EDUCATION/TRAINING PROGRAM

## 2025-05-27 NOTE — PROGRESS NOTES
Chief complaint:  Follow-up (TOV)  Referring physician:  No ref. provider found     SUBJECTIVE:  HPI:  Kike Conde is a 69 y.o. male with a history of TBI on ppx ASA 81mg, BPH and retention s/p TURP 5/19 who presents for post op void trial.  Here with his wife.    Doing well since surgery. Urine cleared up.  Filled 120ml, unable to tolerate more, spasm 50ml, then unable to void.  Wife having migraine today, yesterday. They want to avoid any ER trips.    Medical history:   has a past medical history of Adjustment insomnia (03/07/2016), Hemorrhage of anus and rectum (04/12/2019), Impacted cerumen, right ear (03/07/2016), Opioid abuse, in remission (03/12/2017), and Personal history of other diseases of the respiratory system (01/24/2018).   Surgical history:   has a past surgical history that includes Other surgical history (03/28/2017); Back surgery (04/18/2018); and Hernia repair (04/18/2018).  Family history:  family history includes CREUTZFELDT LORNA DISEASE in his mother; Melanoma in an other family member.  Social history:   reports that he has never smoked. He has never used smokeless tobacco. He reports current alcohol use of about 3.0 standard drinks of alcohol per week. He reports that he does not currently use drugs after having used the following drugs: Marijuana.    Medications:    Current Outpatient Medications   Medication Instructions    amantadine (SYMMETREL) 100 mg, oral, 2 times daily, Take 1 cap in the morning for a week and then twice a day (morning and lunch)    aspirin 81 mg, Daily    carvedilol (COREG) 6.25 mg, 2 times daily    chlorhexidine (Peridex) 0.12 % solution 15 mL, 2 times daily    doxazosin (CARDURA) 4 mg, Nightly    ibuprofen 400 mg, oral, Every 6 hours PRN    meloxicam (MOBIC) 15 mg, Daily    QUEtiapine (SEROQUEL) 50 mg, Nightly    sulfamethoxazole-trimethoprim (Bactrim DS) 800-160 mg tablet 1 tablet, oral, 2 times daily, Start 7 days prior to prostate procedure    traZODone  (DESYREL) 100 mg, Nightly      Allergies:    RX Allergies[1]     ROS:  14-point review of systems negative except as noted above.    OBJECTIVE:  Visit Vitals  /85   Pulse 66   Temp 36.2 °C (97.1 °F)   Body mass index is 21.89 kg/m².    Physical exam  General:  No acute distress  HEENT:  EOMI  CV:  Regular rate  Pulm:  Nonlabored respirations  Abd:  Soft, non-distended  :  No suprapubic or CVA tenderness  MSK:  No contractures  Neuro:  Motor intact  Psych:  Appropriate affect    Labs:    Lab Results   Component Value Date    WBC 7.8 05/09/2025    HGB 12.9 (L) 05/09/2025    HCT 39.7 05/09/2025     05/09/2025    ALT 16 04/17/2025    AST 16 04/17/2025     04/17/2025    K 4.1 04/17/2025     04/17/2025    CREATININE 0.71 04/17/2025    BUN 16 04/17/2025    CO2 30 04/17/2025    INR 1.1 05/19/2025     CULTURE, URINE, ROUTINE (no units)   Date Value   04/29/2025 SEE NOTE (A)      Lab Results   Component Value Date    PSA 1.27 04/17/2025       Imaging:  All imaging discussed in HPI was independently reviewed.    ASSESSMENT:  BPH (48g) with retention s/p TURP 5/19    Did not pass void trial today.  Discussed possibly sending home without catheter for extended void trial, risk of needing ED if retaining.  To avoid that they would prefer an early AM visit, repeat void trial and if needed return to clinic in PM for retention.    PLAN:  Replaced Jarvis, 16F    Follow-up next Monday, 8am, repeat trial of void    Esdras Pena MD    Problem List Items Addressed This Visit       Urinary retention - Primary    BPH with obstruction/lower urinary tract symptoms             [1] No Known Allergies

## 2025-05-27 NOTE — PROGRESS NOTES
"Speech-Language Pathology    SLP Adult Outpatient Speech-Language Pathology Treatment     Patient Name: Kike Conde  MRN: [id number]  Today's Date: 5/27/2025  Time Calculation  Start Time: 0930  Stop Time: 1020  Time Calculation (min): 50 min       Current Problem:        ICD-10-CM    Injury due to skiing accident V00.328A    Cognitive communication deficit R41.841       SLP Assessment:  SLP TX Intervention Outcome: Making Progress Towards Goals  Treatment Tolerance: Patient tolerated treatment well      Plan:  SLP TX Plan: Continue Plan of Care  SLP Frequency: 2x per week  Discussed POC: Patient  Discussed Risks/Benefits: Yes, Patient  Patient/Caregiver Agreeable: Yes  Next Session: Metacognitive strategy training for established/discussed goals  HEP: \"Brain dump\"/brainstorm of tasks needed to reach established/discussed goals      Subjective   Patient arrived to and attended treatment accompanied by his wife. He was last seen for treatment on 5/2. Since that time, patient has been busy with medical appointments and PT/OT. Patient had an eye exam and now has a new prescription. He was seen by neurology on 5/21 at which time he reported persistent cognitive deficits. Patient was referred for neuropsych evaluation (scheduled in 9/2025 but are hoping for sooner). He has an appointment with urology later today in hopes of getting catheter removed.    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 The process is slow to start, but patient is beginning to feel a little more proficient now that he has gone through the process of posting items a few times. He is being helped by a friend who began writing down steps to make a listing for patient's reference. Patient reported feeling very motivated getting back into the process, only calling it quits when working on it a few days ago because of technical issues (not because of fatigue).      General Visit Information:  Certification Period Start Date: " 4/18/2025  Certification Period End Date: 7/17/2025  Number of Authorized Treatments : Based on [place] ([employer] MEDICARE, $40 CO PAY)  Total Number of Visits : 6      Pain Assessment:  Pain Assessment  Pain Assessment: 0-10  0-10 (Numeric) Pain Score: 0 - No pain      Objective     [place] Naming Test (BNT):  --Spontaneous correct responses: 45/60  --Stimulus cues given: 2  --Correct responses with stimulus cues: 0  --Phonemic cues give: 15  --Correct responses with phonemic cues: 13  --Multiple choice cues given: 4  --Correct responses with multiple choice cues: 4  *Patient noted to use gestures, delays, description, and related words independently to cue himself       PATIENT'S PERSONAL UPCOMING GOALS:  1. CONTINUE TO LIST HATS ON [employer] AND INCREASE INDEPENDENCE  2. ORGANIZE A POP UP [facility] TENT FOR AN UPCOMING FOURTH OF JULY FESTIVAL  3. INDEPENDENT MEDICATION MANAGEMENT  4. INDEPENDENT SCHEDULING/APPOINTMENT MANAGEMENT      GOALS: Start date: 4/18/2025 ; anticipated end/re-eval date: 7/17/2025  By discharge:  LTG:   Patient will independently and effectively communicate in valued conversations in the home and social settings using compensatory strategies as needed.   > GOAL STATUS: Ongoing; progressing  Patient will increase functional independence to participate in the home, community, and work environment.   > GOAL STATUS: Ongoing; progressing    STGs:   Patient will perform word retrieval tasks for vocabulary/topics/tasks identified as personally-relevant to the patient with 90% accuracy given minimal cueing.  > NOT ADDRESSED. Patient communicated at the discourse level with 100% accurate and timely word recall independently.  > GOAL STATUS: Ongoing; progressing    Patient will demonstrate knowledge and use of >= 2 compensatory strategies for impaired word recall and will independently utilize them in 4/5 opportunities at the discourse level to prevent communication breakdowns.  > NOT ADDRESSED. Patient  "communicated at the discourse level with 100% accurate and timely word recall independently. He did not demonstrate need for use of strategies to compensate  > GOAL STATUS: Ongoing; progressing    Patient will utilize compensatory strategies for memory in order to perform tasks relevant to the patient with 90% accuracy given minimal cueing.    > NOT ADDRESSED.  >GOAL STATUS: Ongoing; progressing    Patient will utilize compensatory strategies for executive functioning in order to perform tasks relevant to the patient with 90% accuracy given minimal cueing.    > ADDRESSED: Patient's personal goals discussed (see list above). 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 Patient began listing steps, however, some aspects of the process required activities to be performed before the actual process could even begin (e.g. work truck needs cleaned out and items currently stored in the work truck need a place to be held so that merchandise for the festival can be put there, etc). Therapist suggested for HEP that patient begin with a \"brain dump\" or a brainstorm during which he jots down all tasks he can think of that need done in no particular order. Once written, patient was then instructed to begin sequencing and prioritizing tasks. 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  > GOAL STATUS: Ongoing; progressing    Patient will utilize learned fatigue/frustration management strategies independently.    > NOT ADDRESSED.  > GOAL STATUS: Ongoing; progressing      Outpatient Education:  Individual(s) Educated: Patient, spouse  Verbal Education : Patient's personal goals; metacognitive strategy training; HEP re: \"brain dump\" for upcoming festival  Risk and Benefits " Discussed with Patient/Caregiver/Other: yes  Patient/Caregiver Demonstrated Understanding: yes  Plan of Care Discussed and Agreed Upon: yes  Patient Response to Education: Patient/Caregiver Verbalized Understanding of Information, Patient/Caregiver Asked Appropriate Questions

## 2025-05-28 LAB
LABORATORY COMMENT REPORT: NORMAL
PATH REPORT.FINAL DX SPEC: NORMAL
PATH REPORT.GROSS SPEC: NORMAL
PATH REPORT.RELEVANT HX SPEC: NORMAL
PATH REPORT.TOTAL CANCER: NORMAL

## 2025-05-29 ENCOUNTER — APPOINTMENT (OUTPATIENT)
Dept: PHYSICAL THERAPY | Facility: CLINIC | Age: 70
End: 2025-05-29
Payer: MEDICARE

## 2025-05-29 ENCOUNTER — DOCUMENTATION (OUTPATIENT)
Dept: PHYSICAL THERAPY | Facility: CLINIC | Age: 70
End: 2025-05-29
Payer: MEDICARE

## 2025-05-29 ENCOUNTER — APPOINTMENT (OUTPATIENT)
Dept: OCCUPATIONAL THERAPY | Facility: CLINIC | Age: 70
End: 2025-05-29
Payer: MEDICARE

## 2025-05-29 ENCOUNTER — DOCUMENTATION (OUTPATIENT)
Dept: OCCUPATIONAL THERAPY | Facility: CLINIC | Age: 70
End: 2025-05-29
Payer: MEDICARE

## 2025-05-29 NOTE — PROGRESS NOTES
Physical Therapy                 Therapy Communication Note    Patient Name: Kike Conde  MRN: [id number]  Department:   Room: Room/bed info not found  Today's Date: 5/29/2025     Discipline: Physical Therapy      Missed Time: Cancel    Comment: Patient is sick

## 2025-05-29 NOTE — PROGRESS NOTES
Occupational Therapy                 Therapy Communication Note    Patient Name: Kike Conde  MRN: [id number]  Department:   Room: Room/bed info not found  Today's Date: 5/29/2025     Discipline: Occupational Therapy        Missed Time: Cancel    Comment:   Sick

## 2025-06-02 ENCOUNTER — APPOINTMENT (OUTPATIENT)
Dept: PHYSICAL MEDICINE AND REHAB | Facility: CLINIC | Age: 70
End: 2025-06-02
Payer: MEDICARE

## 2025-06-02 ENCOUNTER — APPOINTMENT (OUTPATIENT)
Dept: UROLOGY | Facility: CLINIC | Age: 70
End: 2025-06-02
Payer: MEDICARE

## 2025-06-02 VITALS
SYSTOLIC BLOOD PRESSURE: 132 MMHG | HEIGHT: 72 IN | TEMPERATURE: 97.2 F | HEART RATE: 71 BPM | DIASTOLIC BLOOD PRESSURE: 86 MMHG | BODY MASS INDEX: 22.16 KG/M2 | WEIGHT: 163.6 LBS

## 2025-06-02 DIAGNOSIS — R33.9 URINARY RETENTION: Primary | ICD-10-CM

## 2025-06-02 PROCEDURE — 1159F MED LIST DOCD IN RCRD: CPT | Performed by: STUDENT IN AN ORGANIZED HEALTH CARE EDUCATION/TRAINING PROGRAM

## 2025-06-02 PROCEDURE — 1036F TOBACCO NON-USER: CPT | Performed by: STUDENT IN AN ORGANIZED HEALTH CARE EDUCATION/TRAINING PROGRAM

## 2025-06-02 PROCEDURE — 99024 POSTOP FOLLOW-UP VISIT: CPT | Performed by: STUDENT IN AN ORGANIZED HEALTH CARE EDUCATION/TRAINING PROGRAM

## 2025-06-02 PROCEDURE — 3008F BODY MASS INDEX DOCD: CPT | Performed by: STUDENT IN AN ORGANIZED HEALTH CARE EDUCATION/TRAINING PROGRAM

## 2025-06-02 PROCEDURE — 1160F RVW MEDS BY RX/DR IN RCRD: CPT | Performed by: STUDENT IN AN ORGANIZED HEALTH CARE EDUCATION/TRAINING PROGRAM

## 2025-06-02 NOTE — PROGRESS NOTES
Chief complaint:  Follow-up (TOV)  Referring physician:  No ref. provider found     SUBJECTIVE:  HPI:  Kike Conde is a 69 y.o. male with a history of TBI on ppx ASA 81mg, BPH and retention s/p TURP 5/19 who presents for post op void trial #2.  Here with his wife.     6/2 - Instilled 300ml, voided 75ml, PVR 225ml by bladder scan.  5/27 - Failed void trial.  Elected to return 6/2 early AM for more extended void trial rather than risk ED visit.    Medical history:   has a past medical history of Adjustment insomnia (03/07/2016), Hemorrhage of anus and rectum (04/12/2019), Impacted cerumen, right ear (03/07/2016), Opioid abuse, in remission (03/12/2017), and Personal history of other diseases of the respiratory system (01/24/2018).   Surgical history:   has a past surgical history that includes Other surgical history (03/28/2017); Back surgery (04/18/2018); and Hernia repair (04/18/2018).  Family history:  family history includes CREUTZFELDT LORNA DISEASE in his mother; Melanoma in an other family member.  Social history:   reports that he has never smoked. He has never used smokeless tobacco. He reports current alcohol use of about 3.0 standard drinks of alcohol per week. He reports that he does not currently use drugs after having used the following drugs: Marijuana.    Medications:    Current Outpatient Medications   Medication Instructions    amantadine (SYMMETREL) 100 mg, oral, 2 times daily, Take 1 cap in the morning for a week and then twice a day (morning and lunch)    aspirin 81 mg, Daily    carvedilol (COREG) 6.25 mg, 2 times daily    doxazosin (CARDURA) 4 mg, Nightly    meloxicam (MOBIC) 15 mg, oral, Daily    QUEtiapine (SEROQUEL) 50 mg, Nightly    sulfamethoxazole-trimethoprim (Bactrim DS) 800-160 mg tablet 1 tablet, oral, 2 times daily, Start 7 days prior to prostate procedure    traZODone (DESYREL) 100 mg, Nightly      Allergies:    RX Allergies[1]     ROS:  14-point review of systems negative except  as noted above.    OBJECTIVE:  Visit Vitals  /86   Pulse 71   Temp 36.2 °C (97.2 °F)   Body mass index is 22.19 kg/m².    Physical exam  General:  No acute distress  HEENT:  EOMI  CV:  Regular rate  Pulm:  Nonlabored respirations  Abd:  Soft, non-distended  :  No suprapubic or CVA tenderness  MSK:  No contractures  Neuro:  Motor intact  Psych:  Appropriate affect    Labs:    Lab Results   Component Value Date    WBC 7.8 05/09/2025    HGB 12.9 (L) 05/09/2025    HCT 39.7 05/09/2025     05/09/2025    ALT 16 04/17/2025    AST 16 04/17/2025     04/17/2025    K 4.1 04/17/2025     04/17/2025    CREATININE 0.71 04/17/2025    BUN 16 04/17/2025    CO2 30 04/17/2025    INR 1.1 05/19/2025     CULTURE, URINE, ROUTINE (no units)   Date Value   04/29/2025 SEE NOTE (A)      Lab Results   Component Value Date    PSA 1.27 04/17/2025       Imaging:  All imaging discussed in HPI was independently reviewed.    ASSESSMENT:  BPH (48g) with retention s/p TURP 5/19   TBI    Discussed options at length.  Will send home for continued void trial.  May have elevated baseline PVR given bladder dysfunction secondary to chronic obstruction from BPH.  TBI likely also playing a role.  If discomfort or any concerns requested prompt return to clinic by no later than 3pm for PVR.  May proceed with either CIC teaching or Jarvis replacement.    PLAN:  Extended void trial at home  RTC by 3pm if any concerns  If needed, CIC teaching vs Jarvis    Follow-up 1 month PVR if successful    Esdras Pena MD    Problem List Items Addressed This Visit       Urinary retention - Primary    Relevant Orders    Voiding Trial (Completed)             [1] No Known Allergies

## 2025-06-04 ENCOUNTER — APPOINTMENT (OUTPATIENT)
Dept: PRIMARY CARE | Facility: CLINIC | Age: 70
End: 2025-06-04
Payer: MEDICARE

## 2025-06-05 ENCOUNTER — TREATMENT (OUTPATIENT)
Dept: PHYSICAL THERAPY | Facility: CLINIC | Age: 70
End: 2025-06-05
Payer: MEDICARE

## 2025-06-05 DIAGNOSIS — R42 DIZZINESS: ICD-10-CM

## 2025-06-05 DIAGNOSIS — R26.89 IMPAIRMENT OF BALANCE: ICD-10-CM
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
## 2025-06-05 DIAGNOSIS — R26.89 IMPAIRED GAIT AND MOBILITY: Primary | ICD-10-CM

## 2025-06-05 DIAGNOSIS — R27.9 LACK OF COORDINATION: ICD-10-CM

## 2025-06-05 PROCEDURE — 97112 NEUROMUSCULAR REEDUCATION: CPT | Mod: GP | Performed by: PHYSICAL THERAPIST

## 2025-06-05 NOTE — PROGRESS NOTES
Physical Therapy    Physical Therapy Treatment    Patient Name: Kike Conde  MRN: [id number]  Today's Date: 6/5/2025  Time Calculation  Start Time: 1025  Stop Time: 1115  Time Calculation (min): 50 min     PT Therapeutic Procedures Time Entry  Neuromuscular Re-Education Time Entry: 50       Visit number: 6  Insurance: Payor: [employer] / Plan: AET MEDICARE ASSURE / Product Type: *No Product type* /   Plan of Care Dates: 4/22/2025  Authorized visits: [place]  Primary diagnosis: Impairment of balance    Assessment:  Patient has been cleared by vascular and neurology to initiate aerobic exercise.  Slow and monitored initiation.  Time was spent at length discussing possible benefits of visual exercises to practice ocular convergence now that appropriate corrective glasses have been received.  Patient and spouse report ophthalmologist felt visual exercises may not b helpful, but probably wouldn't hurt.  Spouse's searches on line showed some individuals with TBI benefited from visual exercises.  Patient will restart exercises with spouse assist.  Patient demonstrated excellent ambulation and postural control during distracting and complex environment including bright light, variable surfaces, up and down inclines, while holding a thoughtful and indepth conversation.  He will continue to benefit from skilled PT to continue to improve vision functioning, balance, gait, and overall functional mobility.      Plan: Continue per plan of care to improve coordination and balance during progressively challenging standing and functional activities.  Progress visual acuity and conjugate gaze training.     Current Problem  Problem List Items Addressed This Visit           ICD-10-CM    Injury due to skiing accident V00.328A    Impaired gait and mobility - Primary R26.89    Impairment of balance R26.89    Lack of coordination R27.9    Dizziness R42       Subjective    Patient states he feels that his blurry vision and how his head  feels are biggest deficits and barriers to feeling normal right now.  Patient reports his brain feels off.  He acknowledges he is imperoving in his mobility and balance, but he still doesn't feel like himself.      Precautions:   Moderate falls risk due to distractibility and coordination deficits.  Dizzy/Lightheadedness with positional changes    Pain    0/10 - no pain    Objective     Treatments:  NEUROMUSCULAR REEDUCATION x 50 minutes  Provided manual facilitation for correct timing and accuracy of postural responses as needed during functional mobility.  Gait inside and outside clinic:   - supervision for all tasks and mobility, no device   - Gait indoors   - 400' x 1 including navigation on flat, well lit, even carpeted surfaces during conversation, keeping track of conversation and directed path without stumble, abrupt obstacle avoidence or path deviation.  Gait outside:    - 35 minutes walking without rest a self selected comfortable pace    - variable surfaces including asphalt, concrete, hard dirt, uneven thick grass, uneven long grass    - up, down and across slight, moderate and steep inclines including curbs and uneven grassy surfaces    - bright sunlight with moderate wind, loud traffic noises from nearby highway    - constant running 2 sided conversation about patient's progress, barriers to recovery and personal   insights into deficits.    Interventions not performed in clinic on 6/5/2025:  Visual acuity training  - Seated diaphragmatic breathing   - Educated patient on importance of nutrition/hydration and the recommended daily amount of fluid being half of one's body weight in ounces  - Educated patient on orthostatic hypotension and method to improve blood flow such as heel-toe raises and marches; bending down and raising slowly  - Education on current evidence based research on moderate-high intensity aerobic training to allow for BDNF and neuroplasticity for patient's with TBI - patient's wife  "to contact Vascular doctor regarding weather or not there is a limitation of the aerobic intensity as he was just cleared for aerobic exercise   - Monocular - horizontal and vertical smooth pursuits x 20 each R/L   - Monocular - horizontal and vertical saccades x 20 each R/L   * R eye with medial-lateral deviation vertically   * Subjective report of hazy/blurry at end range bilaterally   - Monocular - pencil push-up x 10 R/L   * R eye decreased convergence compared to L    OP EDUCATION: Instructed patient via verbal cues, tactile cues, and demonstration for proper form and technique of exercises.  Educated patient regarding purpose of all activities.   Discussed criteria for \"successful\" motor responses and progression of challenge.   - Patient perseverates on pre/post morbid differences   - encouraged to understand ability to perform increasingly difficult motor tasks even though he doesn't feel like himself - and encouraged to attempt to recognize this progress.    HOME EXERCISE PROGRAM:  - marching bridge with arms across chest - instructed to focus on pelvic stability and control while switching legs.    - walking lunge, high knee side stepping.   - wall sits - even and staggered stance   - wall squats - even and staggered stance.    Access Code: [email or web address]  URL: [email or web address]  Date: 05/22/2025  Prepared by: Bharath Burnham    Exercises  - Seated Diaphragmatic Breathing  - 1 x daily - 7 x weekly - 1 sets - 10 reps  - Supine Diaphragmatic Breathing  - 1 x daily - 7 x weekly - 1 sets - 10 reps  - Seated Horizontal Saccades  - 1 x daily - 7 x weekly - 1 sets - 10 reps  - Seated Vertical Saccades  - 1 x daily - 7 x weekly - 1 sets - 10 reps  - Seated Horizontal Smooth Pursuit  - 1 x daily - 7 x weekly - 1 sets - 10 reps  - Seated Vertical Smooth Pursuit  - 1 x daily - 7 x weekly - 1 sets - 10 reps  - Pencil Pushups  - 1 x daily - 7 x weekly - 1 sets - 10 reps    Goals:  Active       PT " Problem       Motor control/coordination       Start:  04/24/25    Expected End:  07/21/25       Patient will demonstrate improved LE coordination as evidenced by symmetrical effortless heel to shin test in sitting and in supine to allow improved automatic motor control for more efficient functional mobility.    Patient will demonstrate independence and compliance on HEP with written and verbal cues as needed to maximize LE and core motor control.         Balance       Start:  04/24/25    Expected End:  07/21/25       Patient will demonstrate improved dynamic balance  as evidenced by symmetrical SLS of at least 20 seconds, and 12/12 on MiniBest Test balance scale to allow progression of safety and independence during higher level activities such as hiking on uneven terrain.    Patient will demonstrate improved confidence and insight into challenges as evidenced by ABC score of at least 1120 (70%).         Gait       Start:  04/24/25    Expected End:  07/21/25       Patient will demonstrate improved gait in complex environments as evidenced by 10/10 on MiniBest Test gait scale, and <10% difference between TUG and Cog TUG to allow improved automaticity and cognitive endurance during crowded community based activities like shopping, eating out, and working out in gym.         Patient Stated Goal 1       Start:  04/24/25    Expected End:  07/21/25       Regain strength and balance, address vision issues, think clearer.  (80 - 90% recovery from injury - self rated)

## 2025-06-09 ENCOUNTER — TELEPHONE (OUTPATIENT)
Dept: PRIMARY CARE | Facility: CLINIC | Age: 70
End: 2025-06-09
Payer: MEDICARE
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
## 2025-06-09 RX ORDER — TRAZODONE HYDROCHLORIDE 100 MG/1
100 TABLET ORAL NIGHTLY
Qty: 90 TABLET | Refills: 3 | OUTPATIENT
Start: 2025-06-09

## 2025-06-09 RX ORDER — TRAZODONE HYDROCHLORIDE 100 MG/1
100 TABLET ORAL NIGHTLY
Qty: 90 TABLET | Refills: 3 | Status: SHIPPED | OUTPATIENT
Start: 2025-06-09

## 2025-06-09 NOTE — TELEPHONE ENCOUNTER
From Reynold:  Good morning... I hope you had a fabulous time up in the u.p. probably had to snuggle a little with this weather. Korey was able to get rid of the catheter on 2nd try.... yah. But peeing mostly at night. I am concerned about lack of sleep with TBI recovery... I have messaged urologist.. can you read the messages??? Also Korey needs the trazadone refill. Thanks for all

## 2025-06-10 ENCOUNTER — APPOINTMENT (OUTPATIENT)
Dept: SPEECH THERAPY | Facility: CLINIC | Age: 70
End: 2025-06-10
Payer: MEDICARE

## 2025-06-10 ENCOUNTER — APPOINTMENT (OUTPATIENT)
Dept: PHYSICAL THERAPY | Facility: CLINIC | Age: 70
End: 2025-06-10
Payer: MEDICARE

## 2025-06-10 ENCOUNTER — DOCUMENTATION (OUTPATIENT)
Dept: SPEECH THERAPY | Facility: CLINIC | Age: 70
End: 2025-06-10
Payer: MEDICARE

## 2025-06-10 NOTE — PROGRESS NOTES
Speech-Language Pathology                 Therapy Communication Note    Patient Name: Kike Conde  MRN: [id number]  Today's Date: 6/10/2025     Discipline: Speech Language Pathology    Missed Visit Reason: Patient cancelled his scheduled Speech Therapy appointment this date via [employer]    Missed Time: Cancel

## 2025-06-12 ENCOUNTER — TREATMENT (OUTPATIENT)
Dept: PHYSICAL THERAPY | Facility: CLINIC | Age: 70
End: 2025-06-12
Payer: MEDICARE
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
## 2025-06-12 DIAGNOSIS — N40.1 BPH WITH OBSTRUCTION/LOWER URINARY TRACT SYMPTOMS: Primary | ICD-10-CM

## 2025-06-12 DIAGNOSIS — N13.8 BPH WITH OBSTRUCTION/LOWER URINARY TRACT SYMPTOMS: Primary | ICD-10-CM

## 2025-06-12 PROCEDURE — 97112 NEUROMUSCULAR REEDUCATION: CPT | Mod: GP | Performed by: PHYSICAL THERAPIST

## 2025-06-12 RX ORDER — DOXAZOSIN 4 MG/1
4 TABLET ORAL NIGHTLY
Qty: 90 TABLET | Refills: 3 | Status: SHIPPED | OUTPATIENT
Start: 2025-06-12

## 2025-06-12 NOTE — PROGRESS NOTES
Physical Therapy    Physical Therapy Treatment    Patient Name: Kike Conde  MRN: [id number]  Today's Date: 6/12/2025  Time Calculation  Start Time: 1020  Stop Time: 1105  Time Calculation (min): 45 min     PT Therapeutic Procedures Time Entry  Neuromuscular Re-Education Time Entry: 45       Visit number: 7  Insurance: Payor: [employer] / Plan: AET MEDICARE ASSURE / Product Type: *No Product type* /   Plan of Care Dates: 4/22/2025  Authorized visits: [place]  Primary diagnosis: Impairment of balance    Assessment:  Patient has been cleared by vascular and neurology to initiate aerobic exercise.  Slow and monitored initiation.  Time was spent discussing possible benefits of visual exercises to practice ocular convergence now that appropriate corrective glasses have been received.  Patient and spouse report they have followed vision exercises at home 2-3x this week, but have also had significantly interrupted sleep and increased fatigue due to bladder issues during the day and overnight. Patient demonstrated excellent ambulation and postural control during distracting and complex environment including bright light, variable surfaces, up and down inclines, while holding a thoughtful and indepth conversation.  He will continue to benefit from skilled PT to continue to improve vision functioning, balance, gait, and overall functional mobility.      Plan: Continue per plan of care to improve coordination and balance during progressively challenging standing and functional activities.  Progress visual acuity and conjugate gaze training.     Current Problem  Problem List Items Addressed This Visit           ICD-10-CM    Injury due to skiing accident V00.328A       Subjective    Patient states he feels that his blurry vision and how his head feels are biggest deficits and barriers to feeling normal right now.  Patient reports his brain feels off.  He acknowledges he is improving in his mobility and balance, but he still  "doesn't feel like himself.      Precautions:   Moderate falls risk due to distractibility and coordination deficits.  Dizzy/Lightheadedness with positional changes    Pain    0/10 - no pain    Objective     Treatments:  NEUROMUSCULAR REEDUCATION x 45 minutes  Provided manual facilitation for correct timing and accuracy of postural responses as needed during functional mobility.  Gait and gait exercises in clinic   - close supervision for all tasks and mobility, no device  Gait ex -    - lateral shuffle in narrow hallway, 20' x10   - cues to focus on movement pattern, not visual stimuli   - discovered looking in direction he is moving helped him feel more balanced and in control   - Lateral shuffle in open gym - 15' x 4 each way - less visual overlay/difficulty   -\"off to see the wizard\" - forward walk with contralateral step behind.    - very difficult until cued and facilitated to allow shoulder and hip rotation during lateral step    - 20' x 20 in narrow hallway    - 15' x 8 in open gym.  Reviewed vertical tracking and convergence exercises - Binocular    Interventions not performed in clinic on 6/12/2025:  Visual acuity training  - Seated diaphragmatic breathing   - Education on current evidence based research on moderate-high intensity aerobic training to allow for BDNF and neuroplasticity for patient's with TBI -    - Monocular - horizontal and vertical smooth pursuits x 20 each R/L   - Monocular - horizontal and vertical saccades x 20 each R/L   * R eye with medial-lateral deviation vertically   * Subjective report of hazy/blurry at end range bilaterally   - Monocular - pencil push-up x 10 R/L   * R eye decreased convergence compared to L    OP EDUCATION: Instructed patient via verbal cues, tactile cues, and demonstration for proper form and technique of exercises.  Educated patient regarding purpose of all activities.     HOME EXERCISE PROGRAM:  - marching bridge with arms across chest - instructed to focus " on pelvic stability and control while switching legs.    - walking lunge, high knee side stepping.   - wall sits - even and staggered stance   - wall squats - even and staggered stance.    Access Code: [email or web address]  URL: [email or web address]  Date: 05/22/2025  Prepared by: Bharath Burnham    Exercises  - Seated Diaphragmatic Breathing  - 1 x daily - 7 x weekly - 1 sets - 10 reps  - Supine Diaphragmatic Breathing  - 1 x daily - 7 x weekly - 1 sets - 10 reps  - Seated Horizontal Saccades  - 1 x daily - 7 x weekly - 1 sets - 10 reps  - Seated Vertical Saccades  - 1 x daily - 7 x weekly - 1 sets - 10 reps  - Seated Horizontal Smooth Pursuit  - 1 x daily - 7 x weekly - 1 sets - 10 reps  - Seated Vertical Smooth Pursuit  - 1 x daily - 7 x weekly - 1 sets - 10 reps  - Pencil Pushups  - 1 x daily - 7 x weekly - 1 sets - 10 reps    Goals:  Active       PT Problem       Motor control/coordination       Start:  04/24/25    Expected End:  07/21/25       Patient will demonstrate improved LE coordination as evidenced by symmetrical effortless heel to shin test in sitting and in supine to allow improved automatic motor control for more efficient functional mobility.    Patient will demonstrate independence and compliance on HEP with written and verbal cues as needed to maximize LE and core motor control.         Balance       Start:  04/24/25    Expected End:  07/21/25       Patient will demonstrate improved dynamic balance  as evidenced by symmetrical SLS of at least 20 seconds, and 12/12 on MiniBest Test balance scale to allow progression of safety and independence during higher level activities such as hiking on uneven terrain.    Patient will demonstrate improved confidence and insight into challenges as evidenced by ABC score of at least 1120 (70%).         Gait       Start:  04/24/25    Expected End:  07/21/25       Patient will demonstrate improved gait in complex environments as evidenced by 10/10 on  MiniBest Test gait scale, and <10% difference between TUG and Cog TUG to allow improved automaticity and cognitive endurance during crowded community based activities like shopping, eating out, and working out in gym.         Patient Stated Goal 1       Start:  04/24/25    Expected End:  07/21/25       Regain strength and balance, address vision issues, think clearer.  (80 - 90% recovery from injury - self rated)

## 2025-06-13 ENCOUNTER — APPOINTMENT (OUTPATIENT)
Dept: PRIMARY CARE | Facility: CLINIC | Age: 70
End: 2025-06-13
Payer: MEDICARE

## 2025-06-13 ENCOUNTER — CLINICAL SUPPORT (OUTPATIENT)
Dept: PRIMARY CARE | Facility: CLINIC | Age: 70
End: 2025-06-13
Payer: MEDICARE

## 2025-06-13 ENCOUNTER — TELEPHONE (OUTPATIENT)
Dept: PRIMARY CARE | Facility: CLINIC | Age: 70
End: 2025-06-13

## 2025-06-13 DIAGNOSIS — R35.0 URINARY FREQUENCY: Primary | ICD-10-CM

## 2025-06-13 LAB
POC APPEARANCE, URINE: ABNORMAL
POC BILIRUBIN, URINE: ABNORMAL
POC BLOOD, URINE: ABNORMAL
POC COLOR, URINE: ABNORMAL
POC GLUCOSE, URINE: NEGATIVE MG/DL
POC KETONES, URINE: NEGATIVE MG/DL
POC LEUKOCYTES, URINE: ABNORMAL
POC NITRITE,URINE: NEGATIVE
POC PH, URINE: 6 PH
POC PROTEIN, URINE: ABNORMAL MG/DL
POC SPECIFIC GRAVITY, URINE: 1.02
POC UROBILINOGEN, URINE: 0.2 EU/DL

## 2025-06-13 PROCEDURE — 81003 URINALYSIS AUTO W/O SCOPE: CPT

## 2025-06-13 RX ORDER — NITROFURANTOIN 25; 75 MG/1; MG/1
100 CAPSULE ORAL 2 TIMES DAILY
Qty: 14 CAPSULE | Refills: 0 | Status: SHIPPED | OUTPATIENT
Start: 2025-06-13 | End: 2025-06-20

## 2025-06-15 LAB — BACTERIA UR CULT: ABNORMAL

## 2025-06-16 ENCOUNTER — APPOINTMENT (OUTPATIENT)
Dept: PHYSICAL THERAPY | Facility: CLINIC | Age: 70
End: 2025-06-16
Payer: MEDICARE

## 2025-06-17 ENCOUNTER — APPOINTMENT (OUTPATIENT)
Dept: UROLOGY | Facility: CLINIC | Age: 70
End: 2025-06-17
Payer: MEDICARE

## 2025-06-17 ENCOUNTER — OFFICE VISIT (OUTPATIENT)
Dept: PHYSICAL MEDICINE AND REHAB | Facility: CLINIC | Age: 70
End: 2025-06-17
Payer: MEDICARE

## 2025-06-17 VITALS
SYSTOLIC BLOOD PRESSURE: 139 MMHG | TEMPERATURE: 97.7 F | WEIGHT: 159.4 LBS | DIASTOLIC BLOOD PRESSURE: 78 MMHG | HEIGHT: 72 IN | BODY MASS INDEX: 21.59 KG/M2 | HEART RATE: 72 BPM

## 2025-06-17 VITALS
SYSTOLIC BLOOD PRESSURE: 130 MMHG | HEART RATE: 61 BPM | TEMPERATURE: 97.9 F | DIASTOLIC BLOOD PRESSURE: 77 MMHG | RESPIRATION RATE: 20 BRPM

## 2025-06-17 DIAGNOSIS — S06.2X9A: Primary | ICD-10-CM

## 2025-06-17 DIAGNOSIS — S06.2X9A: ICD-10-CM

## 2025-06-17 DIAGNOSIS — R35.1 NOCTURIA: Primary | ICD-10-CM

## 2025-06-17 DIAGNOSIS — N32.81 OVERACTIVE BLADDER: ICD-10-CM
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
## 2025-06-17 DIAGNOSIS — R33.9 URINARY RETENTION: ICD-10-CM

## 2025-06-17 DIAGNOSIS — S02.609D: ICD-10-CM

## 2025-06-17 PROCEDURE — 3008F BODY MASS INDEX DOCD: CPT | Performed by: STUDENT IN AN ORGANIZED HEALTH CARE EDUCATION/TRAINING PROGRAM

## 2025-06-17 PROCEDURE — 51798 US URINE CAPACITY MEASURE: CPT | Performed by: STUDENT IN AN ORGANIZED HEALTH CARE EDUCATION/TRAINING PROGRAM

## 2025-06-17 PROCEDURE — 1036F TOBACCO NON-USER: CPT | Performed by: STUDENT IN AN ORGANIZED HEALTH CARE EDUCATION/TRAINING PROGRAM

## 2025-06-17 PROCEDURE — 99024 POSTOP FOLLOW-UP VISIT: CPT | Performed by: STUDENT IN AN ORGANIZED HEALTH CARE EDUCATION/TRAINING PROGRAM

## 2025-06-17 PROCEDURE — 99215 OFFICE O/P EST HI 40 MIN: CPT | Performed by: PHYSICAL MEDICINE & REHABILITATION

## 2025-06-17 PROCEDURE — 1160F RVW MEDS BY RX/DR IN RCRD: CPT | Performed by: STUDENT IN AN ORGANIZED HEALTH CARE EDUCATION/TRAINING PROGRAM

## 2025-06-17 PROCEDURE — 1159F MED LIST DOCD IN RCRD: CPT | Performed by: STUDENT IN AN ORGANIZED HEALTH CARE EDUCATION/TRAINING PROGRAM

## 2025-06-17 ASSESSMENT — ENCOUNTER SYMPTOMS
OCCASIONAL FEELINGS OF UNSTEADINESS: 0
DEPRESSION: 1
LOSS OF SENSATION IN FEET: 0

## 2025-06-17 ASSESSMENT — PATIENT HEALTH QUESTIONNAIRE - PHQ9
1. LITTLE INTEREST OR PLEASURE IN DOING THINGS: NOT AT ALL
2. FEELING DOWN, DEPRESSED OR HOPELESS: SEVERAL DAYS
SUM OF ALL RESPONSES TO PHQ9 QUESTIONS 1 AND 2: 1

## 2025-06-17 ASSESSMENT — PAIN SCALES - GENERAL: PAINLEVEL_OUTOF10: 0-NO PAIN

## 2025-06-17 NOTE — PROGRESS NOTES
Physical Medicine and Rehabilitation  Consultation   06/17/25    Chief Complaint:  Neck Pain     HPI:  Kike Conde is a 69 y.o. old R handed male who presents to the clinic today at the request of neuro for evaluation of neck pain.    Has a history of grade I BRANDON injury on 3/1 in [place] when skiing. He was initially seen at [facility], presumably might have collided with another skier. Had LOC.   Per record;   Edgar Conde is a 69-year-old male who admitted to [facility] by air on 3/1/25 after he collided with another skier. He had a loss of consciousness. 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     Admitted to acute rehab at [facility] from 3.17-4/9. Came back to [place] 4/10.  Started outpatient therapies, speech, OT and PT at [facility].      Per his wife his physical state is doing well. He is a downhill skiier and likely was hit from before.   States his biggest issue is his vision, feels he has double vision which is main shaneka, perception, states regular reading glasses and feels some things are better.  Went to optometrist at [facility] and was found he needs two different treatments.     States covering one is better for double vision.      He still has a muniz, failed trial wo muniz. He was started on Cardura, has a ho of BPH.    No issues w bowel.  Working w therapy on balance. Had a fall didn't pay attention.     States that left arm shoulder rom is an issue, was prior to injury now worse. States main issues are memory and coordination.    Feels overall frustrated. Has some days that he gets down.    Sleeping is  better w seroquel. States sleeping is good seroquel 25 mg and trazadone 100 mg.     He is now on a liquid diet due to jaw surgery.     Difficulty w concentration     He was last seen in April 2025. At that time we discussed:  Impaired concentration, cognitive fatigue  - start amantadine 100 mg am, can then in one week increase to bid    Adjustment/depression/cog impairment  - referral to Dr. 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 Now urinating 5-8 x/night. Difficulty w sleep since constantly awake.  Trying to increase activity.  Increasing indurance. Walking drive way, trying to do yard work.  States that amantadine not taking consistently, states hard to see if it helping.   Stopped Seroquel since was making him   Will be seeing pelvic floor therapist for training,.   Saw neurology and referred neuropsych. He is working w speech for speech and , cog.   Metal removed out of the mouth, dental gave exercises. Eating more now not just soft foods.  Balance is improving depending fatigue. Has different prescriptions for eyes.   Double vision still there but in general its better.     Medical History[1]     Surgical History[2]     Patient Active Problem  List    Diagnosis Date Noted    Other psychoactive substance abuse, in remission 05/27/2025    Acute hypoxic respiratory failure 05/27/2025    Diffuse brain injury with loss of consciousness, subsequent encounter 05/21/2025    Traumatic brain injury with loss of consciousness, subsequent encounter 05/21/2025    Dizziness 05/15/2025    Left shoulder pain 04/22/2025    LUE weakness 04/22/2025    Decreased ROM of left shoulder 04/22/2025    Impaired gait and mobility 04/22/2025    Impairment of balance 04/22/2025    Lack of coordination 04/22/2025    Cognitive communication deficit 04/18/2025    Injury due to skiing accident 04/11/2025    Positive hepatitis C antibody test 03/28/2023    Medicare welcome exam 03/28/2023    Altitude sickness 02/15/2023    Bilateral asymmetric sensorineural hearing loss 02/15/2023    BPH (benign prostatic hyperplasia) 02/15/2023    Cervical radiculopathy 02/15/2023    Neural foraminal stenosis of cervical spine 02/15/2023    Primary insomnia 02/15/2023    Right lumbar radiculopathy 02/15/2023    Subjective tinnitus, bilateral 02/15/2023    Urinary retention 04/15/2025    BPH with obstruction/lower urinary tract symptoms 04/15/2025        Family History[3]     Current Medications[4]     Allergies[5]     Social History[6]     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 , 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, has business    Review of Systems  Constitutional:  Denies fever or chills   Eyes:  Denies change in visual acuity   HENT:  Denies nasal congestion or sore throat   Respiratory:  Denies cough or shortness of breath   Cardiovascular:  Denies chest pain or edema   GI:  Denies abdominal pain, nausea, vomiting, bloody stools or diarrhea   :  Denies dysuria   Integument:  Denies rash   Neurologic: as per HPI  MSK: Per above HPI  Endocrine:  Denies polyuria or polydipsia   Lymphatic:  Denies swollen glands   Psychiatric:  Denies depression or anxiety         Physical Exam:  /77 (BP Location: Right arm, Patient  Position: Sitting)   Pulse 61   Temp 36.6 °C (97.9 °F)   Resp 20     General:  NAD, M    Psychiatric: appropriate mood & affect.   Cardiovascular:  Normal radial pulses; no UE  edema.  Respiratory:  Normal rate; unlabored breathing.  Skin:  Intact; no erythema; no ecchymosis or rash.  Lymphatic:  No lymphadenopathy or lymphedema.  NEURO:  Alert and appropriate.  Speech fluent, conversing appropriately.   Toya  Nystagmus looking left  Left facial droop mild    Motor:    Rt: SA 5/5, SER 5/5, EE 5/5, EF 5/5, WE 5/5, FDIM 5/5, ADM 5/5    Lt: SA 5/5, SER 5/5, EE 5/5, EF 5/5, WE 5/5, FDIM 5/5, ADM 5/5  LE strength 5/5  Sensation:     Light touch decrease left hand     Reflexes:     Right: Biceps 2+, brachioradialis 2+, triceps 2+, patellar 2+, achilles 2+    Left: Biceps 3+, brachioradialis 3+, triceps 3+, patellar 3+, achilles 3+     Babinski's downgoing; no clonus     Hoffmans: positive left   Gait: spastic on L side  Praneeth intact  Finger to nose intact bl         Impression:  Kike Conde is a 69 y.o. M w [facility] history of grade I BRANDON injury on 3/1 in [place] when skiing. He was initially seen at [facility], presumably might have collided with another skier. Had LOC.   He was admitted to [facility] by air on 3/1/25 after he collided with another skier. He had a loss of consciousness. 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  He presents with cognitive fatigue, impaired sleep, difficulty w concentration, decrease left shoulder ROM due to oa and likely rtc tear, urinary retention likely due to mostly BPH, carotid dissection and mandibular fx.     .      Plan:  Impaired  concentration, cognitive fatigue  - start amantadine 100 mg am, can then in one week increase to bid  - hasn't tried consistently due to the bladder and sleep issues    Adjustment/depression/cog impairment  - referral to Dr. 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  - will hold off on mri since needs to address many other issues  - already follows w ortho    Double vision  - fu neuro- optho, getting better w time       Sleep  - trazodone, stopped Seroquel  - if needs to nap during the day, ok to trial magnesium, environmental changes; discussed for short term prn use ok 50 mg trazodone but need to really watch that it does not affect night time sleep  - can cut the seroquel further in half and see if affects sleep  - prior had some sleep difficulties at baseline    Dysphagia  - soft/liquid diet  - related to mandibular fx, wires removed  - referral to PT for TMJ intra oral therapy as long as cleared by dentistry    Fu 8 weeks     Keira Garrett MD    Physical Medicine and Rehabilitation          [1]   Past Medical History:  Diagnosis Date    Adjustment insomnia 03/07/2016    Transient insomnia    Hemorrhage of anus and rectum 04/12/2019    BRBPR (bright red blood per rectum)    Impacted cerumen, right ear 03/07/2016    Impacted cerumen of right ear    Opioid abuse, in remission 03/12/2017    History of heroin abuse    Personal history of other diseases of the respiratory system 01/24/2018    History of influenza   [2]   Past Surgical History:  Procedure Laterality Date    BACK SURGERY  04/18/2018    Back Surgery    HERNIA REPAIR  04/18/2018    Hernia Repair    OTHER SURGICAL HISTORY  03/28/2017     Hemilaminectomy With Disc Removal One Lumbar Interspace   [3]   Family History  Problem Relation Name Age of Onset    Other (CREUTZFELDT LORNA DISEASE) Mother      Melanoma Other SIBLING    [4]   Current Outpatient Medications   Medication Sig Dispense Refill    aspirin 81 mg chewable tablet Chew 1 tablet (81 mg) once daily.      carvedilol (Coreg) 6.25 mg tablet Take 1 tablet (6.25 mg) by mouth 2 times a day.      doxazosin (Cardura) 4 mg tablet Take 1 tablet (4 mg) by mouth once daily at bedtime. 90 tablet 3    meloxicam (Mobic) 15 mg tablet Take 1 tablet (15 mg) by mouth once daily. 30 tablet 3    nitrofurantoin, macrocrystal-monohydrate, (Macrobid) 100 mg capsule Take 1 capsule (100 mg) by mouth 2 times a day for 7 days. 14 capsule 0    QUEtiapine (SEROquel) 50 mg tablet Take 1 tablet (50 mg) by mouth once daily at bedtime.      traZODone (Desyrel) 100 mg tablet Take 1 tablet (100 mg) by mouth once daily at bedtime. 90 tablet 3    amantadine (Symmetrel) 100 mg capsule Take 1 capsule (100 mg) by mouth 2 times a day. Take 1 cap in the morning for a week and then twice a day (morning and lunch) 60 capsule 0     No current facility-administered medications for this visit.   [5] No Known Allergies  [6]   Social History  Socioeconomic History    Marital status:    Tobacco Use    Smoking status: Never    Smokeless tobacco: Never   Vaping Use    Vaping status: Never Used   Substance and Sexual Activity    Alcohol use: Yes     Alcohol/week: 3.0 standard drinks of alcohol     Types: 3 Glasses of wine per week     Comment: not since accident    Drug use: Not Currently     Types: Marijuana     Comment: recrational rare     Social Drivers of Health     Transportation Needs: No Transportation Needs (4/17/2025)    OASIS : Transportation     Lack of Transportation (Medical): No     Lack of Transportation (Non-Medical): No     Patient Unable or Declines to Respond: No   Social Connections: Feeling Socially  Integrated (4/17/2025)    OASIS : Social Isolation     Frequency of experiencing loneliness or isolation: Never

## 2025-06-17 NOTE — PROGRESS NOTES
Chief complaint:  Follow-up and Urinary Frequency (PVR)  Referring physician:  No ref. provider found     SUBJECTIVE:  HPI:  Kike Conde is a 69 y.o. male with a history of TBI on ppx ASA 81mg, BPH and retention s/p TURP 5/19 who presents for follow up.  Here with his wife.     6/17 - Since passing void trial, voiding frequently and high volumes overnight.  Interfering with sleep, impeding TBI recovery.  Limiting fluids after 5pm.  Reviewed detailed bladder diaries.  Below are data from one representative day.  Overnight (9 hour stretch) voided 1630ml or 180ml/hr  Daytime (15 hour stretch) voided 1630ml or 97ml/hr  PVR 11ml  6/2 - Instilled 300ml, voided 75ml, PVR 225ml by bladder scan.  Extended void trial at home, did not return.  5/27 - Failed void trial.  Elected to return 6/2 early AM for more extended void trial rather than risk ED visit.    Medical history:   has a past medical history of Adjustment insomnia (03/07/2016), Hemorrhage of anus and rectum (04/12/2019), Impacted cerumen, right ear (03/07/2016), Opioid abuse, in remission (03/12/2017), and Personal history of other diseases of the respiratory system (01/24/2018).   Surgical history:   has a past surgical history that includes Other surgical history (03/28/2017); Back surgery (04/18/2018); and Hernia repair (04/18/2018).  Family history:  family history includes CREUTZFELDT LORNA DISEASE in his mother; Melanoma in an other family member.  Social history:   reports that he has never smoked. He has never used smokeless tobacco. He reports current alcohol use of about 3.0 standard drinks of alcohol per week. He reports that he does not currently use drugs after having used the following drugs: Marijuana.    Medications:    Current Outpatient Medications   Medication Instructions    amantadine (SYMMETREL) 100 mg, oral, 2 times daily, Take 1 cap in the morning for a week and then twice a day (morning and lunch)    aspirin 81 mg, Daily    carvedilol  (COREG) 6.25 mg, 2 times daily    doxazosin (CARDURA) 4 mg, oral, Nightly    meloxicam (MOBIC) 15 mg, oral, Daily    nitrofurantoin, macrocrystal-monohydrate, (Macrobid) 100 mg capsule 100 mg, oral, 2 times daily    QUEtiapine (SEROQUEL) 50 mg, Nightly    traZODone (DESYREL) 100 mg, oral, Nightly    vibegron 75 mg, oral, Daily    vibegron 75 mg, oral, Daily      Allergies:    RX Allergies[1]     ROS:  14-point review of systems negative except as noted above.    OBJECTIVE:  Visit Vitals  /78   Pulse 72   Temp 36.5 °C (97.7 °F)   Body mass index is 21.62 kg/m².    Physical exam  General:  No acute distress  HEENT:  EOMI  CV:  Regular rate  Pulm:  Nonlabored respirations  Abd:  Soft, non-distended  :  No suprapubic or CVA tenderness  MSK:  No contractures, no lower extremity edema  Neuro:  Motor intact  Psych:  Appropriate affect    Labs:    Lab Results   Component Value Date    WBC 7.8 05/09/2025    HGB 12.9 (L) 05/09/2025    HCT 39.7 05/09/2025     05/09/2025    ALT 16 04/17/2025    AST 16 04/17/2025     04/17/2025    K 4.1 04/17/2025     04/17/2025    CREATININE 0.71 04/17/2025    BUN 16 04/17/2025    CO2 30 04/17/2025    INR 1.1 05/19/2025     CULTURE, URINE, ROUTINE (no units)   Date Value   06/13/2025 SEE NOTE (A)      Lab Results   Component Value Date    PSA 1.27 04/17/2025     Imaging:  All imaging discussed in HPI was independently reviewed.    ASSESSMENT:  BPH (48g) with retention s/p TURP 5/19   Overactive bladder  Nocturia/nocturnal diuresis  TBI    Discussed OAB at length including diagnosis, natural history, pathophysiology and management options.    - First line therapy is behavioral modification, limiting bladder irritants, kegel exercises.    - Second line therapies are daily oral medications, either anticholinergics or beta 3 agonists.  Reviewed side effect profiles panda dry mouth, dry eyes and constipation for anticholinergics and hypertension with mirabegron.  Discussed  longterm risk of cognitive issues with anticholinergics.    - Third line therapies are procedural including pretibial nerve stimulation (PTNS; 12 weekly visits for in-office treatments lasting 30-40 minutes then monthly maintenance visits indefinitely), cystoscopy with injection of Botox (treatments every 6 months, risk of retention), sacral neuromodulation (two-step implant procedure, device typically lasting 15-20 years).    Anticholinergics contraindicated due to cognitive side effects in setting of TBI.    PLAN:  Trial vibegron 75mg daily - 2 weeks samples, rx sent  Nephrology referral to discuss role of desmopressin - risk of disrupting TBI healing if acute hyponatremia  Consider 3rd line procedures if not improving    Follow-up 3 months PVR - call to schedule sooner prn    Esdras Pena MD    Problem List Items Addressed This Visit       Urinary retention    Relevant Orders    Post-Void Residual (Completed)     Other Visit Diagnoses         Nocturia    -  Primary    Relevant Medications    vibegron 75 mg tablet    vibegron 75 mg tablet    Other Relevant Orders    Referral to Nephrology      Overactive bladder        Relevant Medications    vibegron 75 mg tablet    vibegron 75 mg tablet                  [1] No Known Allergies

## 2025-06-19 ENCOUNTER — APPOINTMENT (OUTPATIENT)
Dept: PHYSICAL THERAPY | Facility: CLINIC | Age: 70
End: 2025-06-19
Payer: MEDICARE

## 2025-06-20 ENCOUNTER — APPOINTMENT (OUTPATIENT)
Dept: PRIMARY CARE | Facility: CLINIC | Age: 70
End: 2025-06-20
Payer: MEDICARE

## 2025-06-20 VITALS
BODY MASS INDEX: 21.35 KG/M2 | WEIGHT: 157.4 LBS | SYSTOLIC BLOOD PRESSURE: 100 MMHG | HEART RATE: 70 BPM | DIASTOLIC BLOOD PRESSURE: 60 MMHG | OXYGEN SATURATION: 97 %
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
## 2025-06-20 DIAGNOSIS — Z00.00 ROUTINE GENERAL MEDICAL EXAMINATION AT HEALTH CARE FACILITY: Primary | ICD-10-CM

## 2025-06-20 DIAGNOSIS — N40.1 BENIGN PROSTATIC HYPERPLASIA WITH LOWER URINARY TRACT SYMPTOMS, SYMPTOM DETAILS UNSPECIFIED: ICD-10-CM

## 2025-06-20 DIAGNOSIS — I10 HYPERTENSION, UNSPECIFIED TYPE: ICD-10-CM

## 2025-06-20 DIAGNOSIS — R35.89 POLYURIA: ICD-10-CM

## 2025-06-20 DIAGNOSIS — R79.89 OTHER SPECIFIED ABNORMAL FINDINGS OF BLOOD CHEMISTRY: ICD-10-CM

## 2025-06-20 DIAGNOSIS — D64.9 LOW HEMOGLOBIN: ICD-10-CM

## 2025-06-20 LAB — POC HEMOGLOBIN A1C: 5.2 % (ref 4.2–6.5)

## 2025-06-20 ASSESSMENT — ACTIVITIES OF DAILY LIVING (ADL)
GROCERY_SHOPPING: INDEPENDENT
BATHING: INDEPENDENT
MANAGING_FINANCES: INDEPENDENT
TAKING_MEDICATION: INDEPENDENT
DOING_HOUSEWORK: INDEPENDENT
DRESSING: INDEPENDENT

## 2025-06-20 ASSESSMENT — PATIENT HEALTH QUESTIONNAIRE - PHQ9
1. LITTLE INTEREST OR PLEASURE IN DOING THINGS: SEVERAL DAYS
SUM OF ALL RESPONSES TO PHQ9 QUESTIONS 1 AND 2: 2
2. FEELING DOWN, DEPRESSED OR HOPELESS: SEVERAL DAYS

## 2025-06-20 NOTE — PROGRESS NOTES
Subjective   Reason for Visit: Kike Conde is an 69 y.o. male here for a Medicare Wellness visit.     Past Medical, Surgical, and Family History reviewed and updated in chart.    Reviewed all medications by prescribing practitioner or clinical pharmacist (such as prescriptions, OTCs, herbal therapies and supplements) and documented in the medical record.    HPI  Chronic issues:  - insomnia- ambien previously, now using trazodone and seroquel-- working on cutting back on these to help sleep patterns. Sleep is currently more affected by his urination issues- see below.   - BPH- s/p TURP 5/19. Cath is now out. Having a several large volume urine output over night nearly hourly but not during the day. He is still peeing frequently in daytime but not the large volumes. Ruining his sleep. Uro stumped but considered gemtesa. Nephrology referral to discuss desmopressin, risk of disrupting TBI healing if acute hyponatremia. Saw nephro yesterday -- tracked volume--does have a large fluid intake. Nephro is considering hormone issues, desmopressin; just returned overnight urine jug. May consider working on bladder training with PT.   - HTN-  carvedilol 6.25 mg daily -- bps running on the lower end and he is feeling extremely fatigued.         Injuries/ongoing issues  - TBI w LOC and grade 1 BRANDON march 1 2025 in [place]- completed course of keppra, MRI C spine ok, cleared by neurosurgery. Has followed up with neurology and nsgy here.   - mandible fx- s/p MMF, jaw wired. NJ tube originally placed.  Has seen OMFS here.  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 ASA daily for 3 months. F/u with vascular surgery.   - impaired concentration/cognitive fatigue- starting amantadine 100 mg am from Dr Garrett then can increase to BID.   - adjustment/depression/cog impairement- referring to Dr Daniels. He is having a really hard time. Feels like he is  going backward and is being bounced around. Very frustrated.           Health Maintenance Reminder:  -Medicare: today  -Preventative: today  -Blood Work: april 2025  -PSA: april 2025  -Hep C: complete, neg.  -Colonoscopy: 5/29  -Shingrix: completed  -Prevnar: completed   -Flu: Yearly gets a pharm      Drugs, alcohol, tobacco: has stopped drinking alcohol. Allows himself a small glass of pop some nights. No drugs or tobacco.       Patient Care Team:  Sumi Jovel PA-C as PCP - General (Family Medicine)  Keira Garrett MD as Referring Physician (Physical Medicine and Rehabilitation)   Esdras Pena- Urology  Wendy Melvin- neurology (resident clinic)  Siomara Wynn- ophthalamology  Karmen Benitez- vascular surgery   Nael Caleb Long- neurosurgery   Cristhian Stephen- Orthopedics     Review of Systems    Objective   Vitals:  /60   Pulse 70   Wt 71.4 kg (157 lb 6.4 oz)   SpO2 97%   BMI 21.35 kg/m²       Physical Exam  Constitutional:       Appearance: Normal appearance.   HENT:      Head: Normocephalic and atraumatic.   Eyes:      Extraocular Movements: Extraocular movements intact.      Pupils: Pupils are equal, round, and reactive to light.   Cardiovascular:      Rate and Rhythm: Normal rate and regular rhythm.      Pulses: Normal pulses.      Heart sounds: Normal heart sounds.   Pulmonary:      Effort: Pulmonary effort is normal.      Breath sounds: Normal breath sounds.   Musculoskeletal:         General: Normal range of motion.      Right lower leg: No edema.      Left lower leg: No edema.   Skin:     General: Skin is warm and dry.      Findings: No rash.   Neurological:      General: No focal deficit present.      Mental Status: He is alert and oriented to person, place, and time.   Psychiatric:         Mood and Affect: Mood normal.         Behavior: Behavior normal.       Assessment & Plan  Routine general medical examination at health care facility    Orders:    1 Year Follow Up In Primary Care -  Wellness Exam; Future    Low hemoglobin    Orders:    CBC and Auto Differential    Polyuria  - very large volumes of urine multiple times over night.. 500-600 mLs every hour or so   - urology wants to trial gemtesa, discussed desmopression  - saw nephro who is checking overnight urine (as opposed to full 24 hr)  Orders:    POCT glycosylated hemoglobin (Hb A1C) manually resulted    Other specified abnormal findings of blood chemistry    Orders:    POCT glycosylated hemoglobin (Hb A1C) manually resulted    Benign prostatic hyperplasia with lower urinary tract symptoms, symptom details unspecified  S/p TURP   Cath removed  Having large volume and frequent urination especially overnight.   Continue fu with nephro and uro        Injury due to skiing accident, subsequent encounter  S/p ski accident in march 2025  Has made tremendous progress but remains very frustrated by his cognitive status, limitations.   Sending to Dr Patience Daniels neuropsych rehab   Seeing Dr Garrett for PM&R. Working on trazodone/seroquel and sleep patterns, pain, and amantadine for cognitive stimulation  Can try mag  Continue fu w vascular, OMFS, ortho, neuro, nsgy as scheduled        Hypertension, unspecified type  - well controlled.. BP actually running a little low and he is feeling very fatigued  - try cutting carvedilol to 3.125 mg and see how BP runs and see if this helps his body fatigue.   Orders:    Lipid Panel    Comprehensive Metabolic Panel         Depression Screening  5 - 10 minutes were spent screening for depression.

## 2025-06-20 NOTE — ASSESSMENT & PLAN NOTE
S/p TURP   Cath removed  Having large volume and frequent urination especially overnight.   Continue fu with nephro and uro

## 2025-06-20 NOTE — ASSESSMENT & PLAN NOTE
S/p ski accident in march 2025  Has made tremendous progress but remains very frustrated by his cognitive status, limitations.   Sending to Dr Patience Daniels neuropsych rehab   Seeing Dr Garrett for PM&R. Working on trazodone/seroquel and sleep patterns, pain, and amantadine for cognitive stimulation  Can try mag  Continue fu w vascular, OMFS, ortho, neuro, nsgy as scheduled

## 2025-06-21 LAB
ALBUMIN SERPL-MCNC: 4.6 G/DL (ref 3.6–5.1)
ALP SERPL-CCNC: 65 U/L (ref 35–144)
ALT SERPL-CCNC: 23 U/L (ref 9–46)
ANION GAP SERPL CALCULATED.4IONS-SCNC: 9 MMOL/L (CALC) (ref 7–17)
AST SERPL-CCNC: 21 U/L (ref 10–35)
BASOPHILS # BLD AUTO: 58 CELLS/UL (ref 0–200)
BASOPHILS NFR BLD AUTO: 1 %
BILIRUB SERPL-MCNC: 0.5 MG/DL (ref 0.2–1.2)
BUN SERPL-MCNC: 16 MG/DL (ref 7–25)
CALCIUM SERPL-MCNC: 9.7 MG/DL (ref 8.6–10.3)
CHLORIDE SERPL-SCNC: 102 MMOL/L (ref 98–110)
CHOLEST SERPL-MCNC: 230 MG/DL
CHOLEST/HDLC SERPL: 4.2 (CALC)
CO2 SERPL-SCNC: 26 MMOL/L (ref 20–32)
CREAT SERPL-MCNC: 0.73 MG/DL (ref 0.7–1.35)
EGFRCR SERPLBLD CKD-EPI 2021: 100 ML/MIN/1.73M2
EOSINOPHIL # BLD AUTO: 128 CELLS/UL (ref 15–500)
EOSINOPHIL NFR BLD AUTO: 2.2 %
ERYTHROCYTE [DISTWIDTH] IN BLOOD BY AUTOMATED COUNT: 13.5 % (ref 11–15)
GLUCOSE SERPL-MCNC: 83 MG/DL (ref 65–99)
HCT VFR BLD AUTO: 46.9 % (ref 38.5–50)
HDLC SERPL-MCNC: 55 MG/DL
HGB BLD-MCNC: 15.2 G/DL (ref 13.2–17.1)
LDLC SERPL CALC-MCNC: 152 MG/DL (CALC)
LYMPHOCYTES # BLD AUTO: 1224 CELLS/UL (ref 850–3900)
LYMPHOCYTES NFR BLD AUTO: 21.1 %
MCH RBC QN AUTO: 31.1 PG (ref 27–33)
MCHC RBC AUTO-ENTMCNC: 32.4 G/DL (ref 32–36)
MCV RBC AUTO: 95.9 FL (ref 80–100)
MONOCYTES # BLD AUTO: 441 CELLS/UL (ref 200–950)
MONOCYTES NFR BLD AUTO: 7.6 %
NEUTROPHILS # BLD AUTO: 3950 CELLS/UL (ref 1500–7800)
NEUTROPHILS NFR BLD AUTO: 68.1 %
NONHDLC SERPL-MCNC: 175 MG/DL (CALC)
PLATELET # BLD AUTO: 334 THOUSAND/UL (ref 140–400)
PMV BLD REES-ECKER: 8.9 FL (ref 7.5–12.5)
POTASSIUM SERPL-SCNC: 4.5 MMOL/L (ref 3.5–5.3)
PROT SERPL-MCNC: 7.1 G/DL (ref 6.1–8.1)
RBC # BLD AUTO: 4.89 MILLION/UL (ref 4.2–5.8)
SODIUM SERPL-SCNC: 137 MMOL/L (ref 135–146)
TRIGL SERPL-MCNC: 117 MG/DL
WBC # BLD AUTO: 5.8 THOUSAND/UL (ref 3.8–10.8)

## 2025-06-23 ENCOUNTER — APPOINTMENT (OUTPATIENT)
Dept: SPEECH THERAPY | Facility: CLINIC | Age: 70
End: 2025-06-23
Payer: MEDICARE

## 2025-06-23 ENCOUNTER — APPOINTMENT (OUTPATIENT)
Dept: PHYSICAL THERAPY | Facility: CLINIC | Age: 70
End: 2025-06-23
Payer: MEDICARE

## 2025-06-26 ENCOUNTER — APPOINTMENT (OUTPATIENT)
Dept: PHYSICAL THERAPY | Facility: CLINIC | Age: 70
End: 2025-06-26
Payer: MEDICARE

## 2025-07-01 ENCOUNTER — APPOINTMENT (OUTPATIENT)
Facility: CLINIC | Age: 70
End: 2025-07-01
Payer: MEDICARE

## 2025-07-01 VITALS
SYSTOLIC BLOOD PRESSURE: 122 MMHG | HEART RATE: 63 BPM | DIASTOLIC BLOOD PRESSURE: 74 MMHG | HEIGHT: 72 IN | BODY MASS INDEX: 21.4 KG/M2 | OXYGEN SATURATION: 98 % | WEIGHT: 158 LBS

## 2025-07-01 DIAGNOSIS — I10 PRIMARY HYPERTENSION: Primary | ICD-10-CM

## 2025-07-01 DIAGNOSIS — R35.89 POLYURIA: ICD-10-CM

## 2025-07-01 PROCEDURE — 3008F BODY MASS INDEX DOCD: CPT | Performed by: HOSPITALIST

## 2025-07-01 PROCEDURE — 3074F SYST BP LT 130 MM HG: CPT | Performed by: HOSPITALIST

## 2025-07-01 PROCEDURE — 1159F MED LIST DOCD IN RCRD: CPT | Performed by: HOSPITALIST

## 2025-07-01 PROCEDURE — 3078F DIAST BP <80 MM HG: CPT | Performed by: HOSPITALIST

## 2025-07-01 PROCEDURE — 99205 OFFICE O/P NEW HI 60 MIN: CPT | Performed by: HOSPITALIST

## 2025-07-01 RX ORDER — AMLODIPINE BESYLATE 2.5 MG/1
2.5 TABLET ORAL DAILY
Qty: 30 TABLET | Refills: 1 | Status: SHIPPED | OUTPATIENT
Start: 2025-07-01 | End: 2025-07-31

## 2025-07-01 NOTE — PROGRESS NOTES
Nephrology Notes      Reason for visit :Management of nocturia     History of present Illness: Mr. Stokes is a 69-year-old male with a history of recent traumatic brain injury in March requiring hospitalizations for almost 6 weeks s with complete recovery, BPH status post TURP as of 5/19/2025 and eventually catheter was removed on 6/2/2025.  Patient is a here in the nephrology clinic being referred by urologist for evaluation and management of nocturia.    -He denied any medical history prior to this skin injury.  Post TBI he required normal 6 weeks of hospitalization and Jarvis catheter placement.  It seems patient have been on Jarvis for almost 3+ months and recent postvoid residuals have improved eventually requiring no catheter.  - He has been drinking 10 ounces of water per day most of his intake is during the daytime and after 63:0 he is limiting his water intake to only few cups as needed.  But still his urine output during nighttime is approximately 1630 mL.  He has to wake up multiple times during nighttime disturbing his sleep which is his main concern.  - He denied any burning sensation, hematuria or flank pain.    Other history include: Adjustment insomnia in 2016, hemorrhage of anus and rectum in 2019, impacted cerumen in the right ear in 2016, opioid abuse in remission since 2017, personal history of respiratory system disorder in 2018.      Past Medical History : History of recent TBI, adjustment insomnia disorder , BPH requiring a TURP recently.    Surgical History: BPH requiring TURP as of May 2025, back surgery in 2018 and hernia repair in 2018    Family HX: History of Creutzfeldt Sean disease in the mother, melanoma and other family member    Social Connections: Feeling Socially Integrated (4/17/2025)    OASIS : Social Isolation     Frequency of experiencing loneliness or isolation: Never      Currently lives with his wife never smoked used to drink occasional wine socially but recently  stopped since her he had a traumatic brain injury denied any other use.      PROBLEM LIST:  Assessment & Plan  Primary hypertension    Polyuria           ALLERGIES:  Allergies[1]         CURRENT MEDICATIONS:  Scheduled medications  Scheduled Medications[2]  Continuous medications  Continuous Medications[3]  PRN medications  PRN Medications[4]       OBJECTIVE:    VITALS: Visit Vitals  /74   Pulse 63   Ht 1.829 m (6')   Wt 71.7 kg (158 lb)   SpO2 98%   BMI 21.43 kg/m²   Smoking Status Never   BSA 1.91 m²        General: No distress   Mucosa moist   AI, AC, AF     HEENT: PEERLA  CVS: S1 S2 no murmurs  RESP:  Lungs clear to auscultation   ABDO: Soft, non-tender   Neuro: A + O x 3  Skin: No rash   Extremities: No edema       [unfilled]         ASSESSMENT AND PLAN:    Kike Conde is a 69 y.o. with a history of recent traumatic brain injury in March requiring hospitalizations for almost 6 weeks  with complete recovery, BPH status post TURP as of 5/19/2025 and eventually catheter was removed on 6/2/2025.  Patient is a here in the nephrology clinic being referred by urologist for evaluation and management of nocturia.    - Nocturesis :  - Likely in the setting of poor bladder capacity after recent continuous catheter use versus TBI causing diabetes insipidus.  - Will check basic labs like urine osmolality, serum osmolality and urine lites and recommended them to hold doxazosin and try vibegron before proceeding for any further testing or use of desmopressin.  - Risk of hyponatremia following desmopressin use and traumatic brain injury will worsen the situation.  - Will follow-up closely    Follow labs and clinic follow-up in a month      Thank you for consulting :  Nerissa Harry MD    Notes created by Cristina -Please excuse the Typos .                      [1] No Known Allergies  [2] [3] [4]

## 2025-07-02 ENCOUNTER — APPOINTMENT (OUTPATIENT)
Dept: NEPHROLOGY | Facility: CLINIC | Age: 70
End: 2025-07-02
Payer: MEDICARE

## 2025-07-02 ENCOUNTER — APPOINTMENT (OUTPATIENT)
Dept: NEUROLOGY | Facility: HOSPITAL | Age: 70
End: 2025-07-02
Payer: MEDICARE

## 2025-07-03 ENCOUNTER — CLINICAL SUPPORT (OUTPATIENT)
Dept: PRIMARY CARE | Facility: CLINIC | Age: 70
End: 2025-07-03
Payer: MEDICARE

## 2025-07-03 DIAGNOSIS — R30.0 DYSURIA: Primary | ICD-10-CM

## 2025-07-03 DIAGNOSIS — R30.0 DYSURIA: ICD-10-CM

## 2025-07-03 DIAGNOSIS — R35.0 URINARY FREQUENCY: Primary | ICD-10-CM

## 2025-07-03 LAB
POC APPEARANCE, URINE: CLEAR
POC BILIRUBIN, URINE: NEGATIVE
POC BLOOD, URINE: ABNORMAL
POC COLOR, URINE: YELLOW
POC GLUCOSE, URINE: NEGATIVE MG/DL
POC KETONES, URINE: NEGATIVE MG/DL
POC LEUKOCYTES, URINE: ABNORMAL
POC NITRITE,URINE: NEGATIVE
POC PH, URINE: 6.5 PH
POC PROTEIN, URINE: ABNORMAL MG/DL
POC SPECIFIC GRAVITY, URINE: >=1.03
POC UROBILINOGEN, URINE: 0.2 EU/DL

## 2025-07-03 PROCEDURE — 81003 URINALYSIS AUTO W/O SCOPE: CPT

## 2025-07-03 NOTE — PROGRESS NOTES
Subjective:    Onset of symptoms:    Burning:No  Increased Frequency: Yes  Urgency: Yes  Back Pain: No  Abd Pain: No  Genital Discharge: Yes  Fever: Yes    Objective:    POCT Urine Normal: No    Plan:    See below for dx/orders  Discussed with:   Urine culture ordered: Yes    Billing Code: [id number]

## 2025-07-05 LAB — BACTERIA UR CULT: NORMAL

## 2025-07-16 ENCOUNTER — APPOINTMENT (OUTPATIENT)
Facility: CLINIC | Age: 70
End: 2025-07-16
Payer: MEDICARE

## 2025-07-17 ENCOUNTER — APPOINTMENT (OUTPATIENT)
Dept: UROLOGY | Facility: CLINIC | Age: 70
End: 2025-07-17
Payer: MEDICARE
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
## 2025-07-25 ENCOUNTER — APPOINTMENT (OUTPATIENT)
Dept: PRIMARY CARE | Facility: CLINIC | Age: 70
End: 2025-07-25
Payer: MEDICARE

## 2025-07-25 VITALS
OXYGEN SATURATION: 99 % | BODY MASS INDEX: 21.43 KG/M2 | DIASTOLIC BLOOD PRESSURE: 72 MMHG | SYSTOLIC BLOOD PRESSURE: 118 MMHG | WEIGHT: 158 LBS | HEART RATE: 83 BPM

## 2025-07-25 DIAGNOSIS — R41.841 COGNITIVE COMMUNICATION DEFICIT: ICD-10-CM

## 2025-07-25 DIAGNOSIS — I10 HYPERTENSION, UNSPECIFIED TYPE: Primary | ICD-10-CM

## 2025-07-25 DIAGNOSIS — F32.A DEPRESSION, UNSPECIFIED DEPRESSION TYPE: ICD-10-CM

## 2025-07-25 DIAGNOSIS — R35.1 NOCTURIA: ICD-10-CM

## 2025-07-25 PROCEDURE — 1036F TOBACCO NON-USER: CPT

## 2025-07-25 PROCEDURE — 1159F MED LIST DOCD IN RCRD: CPT

## 2025-07-25 PROCEDURE — G2211 COMPLEX E/M VISIT ADD ON: HCPCS

## 2025-07-25 PROCEDURE — 1160F RVW MEDS BY RX/DR IN RCRD: CPT

## 2025-07-25 PROCEDURE — 3078F DIAST BP <80 MM HG: CPT

## 2025-07-25 PROCEDURE — 99214 OFFICE O/P EST MOD 30 MIN: CPT

## 2025-07-25 PROCEDURE — 3074F SYST BP LT 130 MM HG: CPT

## 2025-07-25 RX ORDER — AMANTADINE HYDROCHLORIDE 100 MG/1
100 TABLET ORAL 2 TIMES DAILY
Qty: 30 TABLET | Refills: 0 | Status: SHIPPED | OUTPATIENT
Start: 2025-07-25 | End: 2026-07-25

## 2025-07-25 ASSESSMENT — PATIENT HEALTH QUESTIONNAIRE - PHQ9
SUM OF ALL RESPONSES TO PHQ9 QUESTIONS 1 AND 2: 0
1. LITTLE INTEREST OR PLEASURE IN DOING THINGS: NOT AT ALL
2. FEELING DOWN, DEPRESSED OR HOPELESS: NOT AT ALL

## 2025-07-25 NOTE — PROGRESS NOTES
Subjective   Patient ID: Kike Conde is a 69 y.o. male who presents for Follow-up.  HPI  Chronic issues:  - insomnia- ambien previously, now using trazodone and seroquel-- working on cutting back on these to help sleep patterns. Sleep is currently more affected by his urination issues- see below.   - BPH- s/p TURP 5/19. Cath is now out.   - HTN- all the way off carvedilol-- bps running on the lower end and he is feeling extremely fatigued.       Injuries/ongoing issues:  - TBI w LOC and grade 1 BRANDON march 1 2025 in [place]- completed course of keppra, MRI C spine ok, cleared by neurosurgery. Has followed up with neurology and nsgy here. Sees neuro next month     - mandible fx- s/p MMF, jaw wired. NJ tube originally placed.  Has seen OMFS here. Doing well from this perspective.    - Pain- mobic, trazodone. 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 ASA daily for 3 months. F/u with vascular surgery.     - impaired concentration/cognitive fatigue- starting amantadine 100 mg am from Dr Garrett then can increase to BID.   - adjustment/depression/cog impairement- referred to Dr Daniels. He is having a really hard time. Feels like he is going backward and is being bounced around. Very frustrated.  Sees neuropsych in nov. He is very depressed. Very poor thoughts. Doesnt feel like hes made any progress. Spirits are so low.     - nocturia- Having a several large volume urine output over night nearly hourly but not during the day. He is still peeing frequently in daytime but not the large volumes. Ruining his sleep. Uro stumped but considered gemtesa. Nephrology referral to discuss desmopressin, risk of disrupting TBI healing if acute hyponatremia. Saw nephro at [facility]. They did not feel it was a hormone problem , had some high sodium after 24 hr urine. Recommended sodium and fluid restriction. Did overnight urine. Doing bladder training. Per  nephro  felt like due to poor bladder capacity after recent continuous catheter use vs TBI causing DI. Holding doxazosin and trialing vibegron . Has not done gemtesa yet.     - visual changes- seeing things on an angle, not double, but there is a spatial stimulus around it. Cannot adjust his eyes. Struggles in places with tall ceilings, shelves, going outside, stores. Ophtho yesterday. Sees dr carter in august.    Current Medications[1]   Surgical History[2]   Medical History[3]  Social History[4]   Family History[5]   Review of Systems  10 point ROS negative except as otherwise noted in the HPI.      Objective   /72   Pulse 83   Wt 71.7 kg (158 lb)   SpO2 99%   BMI 21.43 kg/m²    Physical Exam  Constitutional:       Appearance: Normal appearance.   HENT:      Head: Normocephalic and atraumatic.     Eyes:      Extraocular Movements: Extraocular movements intact.      Pupils: Pupils are equal, round, and reactive to light.       Musculoskeletal:         General: Normal range of motion.      Right lower leg: No edema.      Left lower leg: No edema.     Skin:     General: Skin is warm and dry.      Findings: No rash.     Neurological:      General: No focal deficit present.      Mental Status: He is alert and oriented to person, place, and time.     Psychiatric:      Comments: Constricted affect, depressed mood           Assessment/Plan   Problem List Items Addressed This Visit       Cognitive communication deficit  - wants to come off amantadine, doesnt feel like its doing much   - going to go to 50 mg for a week or so then stop.   - has fu with Dr Garrett and Neuro cognitive rehab Dr Oakes.     Relevant Medications    amantadine (Symmetrel) 100 mg tablet     Other Visit Diagnoses         Hypertension, unspecified type    -  Primary  - BP well controlled off medication.      Nocturia       - He is s/p TURP and successful cath removal. Pt continues to have very large volumes of nocturia. Sometimes upward of  1000 mLs. Has seen uro. No retention. Wanted to try gemtesa and referred to nephro. He then saw [facility] nephro who checked 24 hr urine, felt not DI, hormone related. Recommended fluid restriction and sodium restriction, as well as stopping fluids early in the day. He is doing bladder training.   - D/w  nephro who reviewed chart, agrees no DI, reassured and less concerned given that kidney function is normal. Recommended considering conservative measures like limit fluid intake, limit sugar and salt and high osmolar diet, felt may just need time w lifestyle changes.   - D/w Dr Ravi- REALLY try to encourage gemtesa.       Depression, unspecified depression type      - Pt having a lot of depression, decreased quality of life due to the recovery , nocturia, vision changes, isolation, lack of physical activity. He is grieving. He is having suicidal thoughts. He has loosely considered plans but has no access to guns, pills. Lists very good protective factors especially his wife Jess. They have very open communication and she is aware of his thoughts and feelings. No current plan or intent, but profuse suicidal thoughts. Given strict ER precautions. Talked extensively about starting counseling, given some basic CBT/ACT techniques. Also talked at length and encouraged an SSRI at least short term. He is hesitant but wants to think about it. Given drug sheet. They will discuss over the weekend and get back to me. Given list of local counseling resources.     Relevant Medications    amantadine (Symmetrel) 100 mg tablet     Got neuro appt moved to september   Sees neuro-ophtho in august    F/u in oct/nov w me.        Discussed at visit any disease processes that were of concern as well as the risks, benefits and instructions on any new medication provided. Patient (and/or caretaker of patient if present) stated all questions were answered, and they voiced understanding of instructions.     Sumi Jovel PA-C          [1]    Current Outpatient Medications:     amLODIPine (Norvasc) 2.5 mg tablet, Take 1 tablet (2.5 mg) by mouth once daily., Disp: 30 tablet, Rfl: 1    aspirin 81 mg chewable tablet, Chew and swallow 1 tablet (81 mg) once daily., Disp: , Rfl:     meloxicam (Mobic) 15 mg tablet, Take 1 tablet (15 mg) by mouth once daily., Disp: 30 tablet, Rfl: 3    traZODone (Desyrel) 100 mg tablet, Take 1 tablet (100 mg) by mouth once daily at bedtime., Disp: 90 tablet, Rfl: 3    amantadine (Symmetrel) 100 mg tablet, Take 1 tablet (100 mg) by mouth 2 times a day., Disp: 30 tablet, Rfl: 0  [2]   Past Surgical History:  Procedure Laterality Date    BACK SURGERY  04/18/2018    Back Surgery    HERNIA REPAIR  04/18/2018    Hernia Repair    OTHER SURGICAL HISTORY  03/28/2017    Hemilaminectomy With Disc Removal One Lumbar Interspace   [3]   Past Medical History:  Diagnosis Date    Adjustment insomnia 03/07/2016    Transient insomnia    Brain concussion 3/1/2025    Depression 4/1/2025    Hemorrhage of anus and rectum 04/12/2019    BRBPR (bright red blood per rectum)    Impacted cerumen, right ear 03/07/2016    Impacted cerumen of right ear    Memory loss 3/1/2025    Movement disorder 3/1/2025    Opioid abuse, in remission 03/12/2017    History of heroin abuse    Personal history of other diseases of the respiratory system 01/24/2018    History of influenza    Vision loss 3/1/2025    Weakness of limb 3/1/2025   [4]   Social History  Tobacco Use    Smoking status: Never    Smokeless tobacco: Never   Vaping Use    Vaping status: Never Used   Substance Use Topics    Alcohol use: Not Currently     Alcohol/week: 21.0 standard drinks of alcohol     Types: 21 Glasses of wine per week     Comment: not since accident    Drug use: Not Currently     Types: Marijuana     Comment: recrational rare   [5]   Family History  Problem Relation Name Age of Onset    Other (CREUTZFELDT LORNA DISEASE) Mother      Melanoma Other SIBLING     Dementia Other FATHER 80 - 99

## 2025-07-25 NOTE — PATIENT INSTRUCTIONS
Think about fluoxetine   Think about counseling   ANY thoughts of hurting yourself or others- go to the ER.       Counseling Services   (Many places are doing virtual appointments with patients)     Suicide Hotline: (677) 177-4588    [facility]   [phone]  Variety of locations  [email or web address]    [facility]  [phone]  [address]  [place] [address]    [place]quel    [facility]   [phone]    [facility]   [phone]   [address] [place] [address]  [email or web address]    [employer]     [phone]  [address] [place]   [address]  [employer]    [facility]  [phone] [address]  [place] [address]  [employer]        [facility]  [phone]  [address]  [place] [address]    [facility]   General number: [phone]  <[email or web address]  *no kids*     [facility]  [phone] [address] [place] [address]  *no kids*     [facility]  [phone] [address] [place] [address]  *no kids*     [facility]  [phone] [address] [place] [address]    [facility]: [facility]   [phone]  [address], [address]  Walk in hours: Mon - Fri 1:00 PM - 5:00 PM    [facility]: [facility]   [phone] <[phone]>)  [address], [place], [address]  Walk in hours: Mon - Fri  11:00 AM - 1:30 PM

## 2025-07-28 DIAGNOSIS — F32.A DEPRESSION, UNSPECIFIED DEPRESSION TYPE: Primary | ICD-10-CM

## 2025-07-28 RX ORDER — QUETIAPINE FUMARATE 50 MG/1
50 TABLET, FILM COATED ORAL NIGHTLY
COMMUNITY

## 2025-08-01 ENCOUNTER — DOCUMENTATION (OUTPATIENT)
Dept: OCCUPATIONAL THERAPY | Facility: CLINIC | Age: 70
End: 2025-08-01
Payer: MEDICARE

## 2025-08-01 NOTE — PROGRESS NOTES
Occupational Therapy    Discharge Summary    Name: Kike Conde  MRN: [id number]  : 1955  Date: 25    Discharge Summary: OT    Discharge Information: Date of discharge 25, Date of last visit 25, Date of evaluation 25, Number of attended visits 5, Referred by Sumi Jovel PA-C, and Referred for OT eval and treat;         Discharge Status:  Patient's L shoulder was progressing well when last seen by me on 25. Patient reported overall less pain, and his L shoulder flexion ROM increased from 95 degrees on evaluation to 130 degrees on last visit date.     Rehab Discharge Reason: Patient cancelled last scheduled appointment on 25. Patient has not scheduled any more follow ups over the last two months, and patient's Medicare cert  on 25. Will discharge the chart at this time.  If patient wishes to return to OT, patient will require a new referral.

## 2025-08-13 ENCOUNTER — OFFICE VISIT (OUTPATIENT)
Facility: CLINIC | Age: 70
End: 2025-08-13
Payer: MEDICARE

## 2025-08-13 DIAGNOSIS — Z87.820 HISTORY OF TRAUMATIC BRAIN INJURY: ICD-10-CM

## 2025-08-13 DIAGNOSIS — R41.9 COGNITIVE COMPLAINTS: Primary | ICD-10-CM

## 2025-08-13 DIAGNOSIS — F32.A DEPRESSION, UNSPECIFIED DEPRESSION TYPE: ICD-10-CM

## 2025-08-13 DIAGNOSIS — F32.A DEPRESSION, UNSPECIFIED DEPRESSION TYPE: Primary | ICD-10-CM

## 2025-08-13 DIAGNOSIS — R53.83 OTHER FATIGUE: ICD-10-CM

## 2025-08-13 PROCEDURE — 96121 NUBHVL XM PHY/QHP EA ADDL HR: CPT | Performed by: PSYCHOLOGIST

## 2025-08-13 PROCEDURE — 96116 NUBHVL XM PHYS/QHP 1ST HR: CPT | Performed by: PSYCHOLOGIST

## 2025-08-13 RX ORDER — FLUOXETINE 20 MG/1
20 TABLET ORAL DAILY
Qty: 90 TABLET | Refills: 3 | Status: SHIPPED | OUTPATIENT
Start: 2025-08-13 | End: 2026-08-13

## 2025-08-19 ENCOUNTER — APPOINTMENT (OUTPATIENT)
Dept: OPHTHALMOLOGY | Facility: CLINIC | Age: 70
End: 2025-08-19
Payer: MEDICARE

## 2025-08-19 DIAGNOSIS — H51.9 CONVERGENCE INSUFFICIENCY OR PALSY IN BINOCULAR EYE MOVEMENT: ICD-10-CM

## 2025-08-19 DIAGNOSIS — H49.11: ICD-10-CM

## 2025-08-19 DIAGNOSIS — H53.2 DOUBLE VISION: Primary | ICD-10-CM

## 2025-08-19 PROCEDURE — 92060 SENSORIMOTOR EXAMINATION: CPT | Performed by: PSYCHIATRY & NEUROLOGY

## 2025-08-19 PROCEDURE — 99214 OFFICE O/P EST MOD 30 MIN: CPT | Performed by: PSYCHIATRY & NEUROLOGY

## 2025-08-19 ASSESSMENT — ENCOUNTER SYMPTOMS
EYES NEGATIVE: 1
ALLERGIC/IMMUNOLOGIC NEGATIVE: 0
GASTROINTESTINAL NEGATIVE: 0
CARDIOVASCULAR NEGATIVE: 0
MUSCULOSKELETAL NEGATIVE: 0
ENDOCRINE NEGATIVE: 0
RESPIRATORY NEGATIVE: 0
CONSTITUTIONAL NEGATIVE: 0
HEMATOLOGIC/LYMPHATIC NEGATIVE: 0
PSYCHIATRIC NEGATIVE: 0
NEUROLOGICAL NEGATIVE: 1

## 2025-08-19 ASSESSMENT — REFRACTION_WEARINGRX
OS_AXIS: 088
OS_CYLINDER: -0.50
OD_CYLINDER: -0.50
OD_SPHERE: +2.00
SPECS_TYPE: SVD
OD_AXIS: 070
OS_SPHERE: +1.75

## 2025-08-19 ASSESSMENT — VISUAL ACUITY
METHOD: SNELLEN - LINEAR
OD_PH_CC+: -2
OD_CC: 20/40
OD_PH_CC: 20/20
OD_CC+: +1
OS_CC+: -1
CORRECTION_TYPE: GLASSES
OS_CC: 20/20

## 2025-08-19 ASSESSMENT — EXTERNAL EXAM - LEFT EYE: OS_EXAM: NORMAL

## 2025-08-19 ASSESSMENT — CONF VISUAL FIELD
OD_INFERIOR_NASAL_RESTRICTION: 0
OD_SUPERIOR_NASAL_RESTRICTION: 0
OS_NORMAL: 1
OS_SUPERIOR_TEMPORAL_RESTRICTION: 0
OD_NORMAL: 1
OS_INFERIOR_TEMPORAL_RESTRICTION: 0
OS_INFERIOR_NASAL_RESTRICTION: 0
OS_SUPERIOR_NASAL_RESTRICTION: 0
OD_SUPERIOR_TEMPORAL_RESTRICTION: 0
OD_INFERIOR_TEMPORAL_RESTRICTION: 0

## 2025-08-19 ASSESSMENT — SLIT LAMP EXAM - LIDS
COMMENTS: NORMAL
COMMENTS: NORMAL

## 2025-08-19 ASSESSMENT — TONOMETRY
IOP_METHOD: GOLDMANN APPLANATION
OS_IOP_MMHG: 15
OD_IOP_MMHG: 15

## 2025-08-19 ASSESSMENT — CUP TO DISC RATIO
OS_RATIO: 0.4
OD_RATIO: 0.2

## 2025-08-19 ASSESSMENT — EXTERNAL EXAM - RIGHT EYE: OD_EXAM: NORMAL

## 2025-08-21 ENCOUNTER — OFFICE VISIT (OUTPATIENT)
Facility: CLINIC | Age: 70
End: 2025-08-21
Payer: MEDICARE

## 2025-08-21 DIAGNOSIS — R41.9 COGNITIVE COMPLAINTS: Primary | ICD-10-CM

## 2025-08-21 DIAGNOSIS — Z87.820 HISTORY OF TRAUMATIC BRAIN INJURY: ICD-10-CM

## 2025-08-21 DIAGNOSIS — F32.A DEPRESSION, UNSPECIFIED DEPRESSION TYPE: ICD-10-CM

## 2025-08-21 DIAGNOSIS — R53.83 OTHER FATIGUE: ICD-10-CM

## 2025-08-21 PROCEDURE — 96168 HLTH BHV IVNTJ FAM EA ADDL: CPT | Performed by: PSYCHOLOGIST

## 2025-08-21 PROCEDURE — 96167 HLTH BHV IVNTJ FAM 1ST 30: CPT | Performed by: PSYCHOLOGIST

## 2025-08-22 DIAGNOSIS — R35.1 NOCTURIA: Primary | ICD-10-CM

## 2025-08-25 ENCOUNTER — APPOINTMENT (OUTPATIENT)
Dept: OPHTHALMOLOGY | Facility: CLINIC | Age: 70
End: 2025-08-25
Payer: MEDICARE

## 2025-08-25 DIAGNOSIS — H51.9 CONVERGENCE INSUFFICIENCY OR PALSY IN BINOCULAR EYE MOVEMENT: ICD-10-CM

## 2025-08-25 DIAGNOSIS — H49.11: Primary | ICD-10-CM

## 2025-08-25 DIAGNOSIS — Z87.820 HISTORY OF TRAUMATIC BRAIN INJURY: ICD-10-CM

## 2025-08-25 PROCEDURE — 99212 OFFICE O/P EST SF 10 MIN: CPT | Performed by: OPHTHALMOLOGY

## 2025-08-25 ASSESSMENT — CONF VISUAL FIELD
OD_NORMAL: 1
METHOD: COUNTING FINGERS
OS_NORMAL: 1
OD_INFERIOR_NASAL_RESTRICTION: 0
OS_SUPERIOR_TEMPORAL_RESTRICTION: 0
OS_INFERIOR_TEMPORAL_RESTRICTION: 0
OS_INFERIOR_NASAL_RESTRICTION: 0
OD_INFERIOR_TEMPORAL_RESTRICTION: 0
OD_SUPERIOR_NASAL_RESTRICTION: 0
OD_SUPERIOR_TEMPORAL_RESTRICTION: 0
OS_SUPERIOR_NASAL_RESTRICTION: 0

## 2025-08-25 ASSESSMENT — VISUAL ACUITY
OS_SC+: -1
OD_PH_SC: 20/25
METHOD: SNELLEN - LINEAR
OD_SC: 20/40
OS_SC: 20/20
OD_PH_SC+: +2

## 2025-08-25 ASSESSMENT — TONOMETRY
OS_IOP_MMHG: 12
OD_IOP_MMHG: 12
IOP_METHOD: GOLDMANN APPLANATION

## 2025-08-25 ASSESSMENT — CUP TO DISC RATIO
OS_RATIO: 0.4
OD_RATIO: 0.2

## 2025-08-25 ASSESSMENT — SLIT LAMP EXAM - LIDS
COMMENTS: NORMAL
COMMENTS: NORMAL

## 2025-08-25 ASSESSMENT — ENCOUNTER SYMPTOMS: EYES NEGATIVE: 1

## 2025-08-25 ASSESSMENT — EXTERNAL EXAM - LEFT EYE: OS_EXAM: NORMAL

## 2025-08-25 ASSESSMENT — EXTERNAL EXAM - RIGHT EYE: OD_EXAM: NORMAL

## 2025-08-26 ENCOUNTER — APPOINTMENT (OUTPATIENT)
Facility: CLINIC | Age: 70
End: 2025-08-26
Payer: MEDICARE

## 2025-08-28 ENCOUNTER — OFFICE VISIT (OUTPATIENT)
Facility: CLINIC | Age: 70
End: 2025-08-28
Payer: MEDICARE

## 2025-08-28 DIAGNOSIS — R41.9 COGNITIVE COMPLAINTS: Primary | ICD-10-CM

## 2025-08-28 DIAGNOSIS — F32.A DEPRESSION, UNSPECIFIED DEPRESSION TYPE: ICD-10-CM

## 2025-08-28 DIAGNOSIS — Z87.820 HISTORY OF TRAUMATIC BRAIN INJURY: ICD-10-CM

## 2025-08-28 DIAGNOSIS — R53.83 OTHER FATIGUE: ICD-10-CM

## 2025-08-28 PROCEDURE — 96167 HLTH BHV IVNTJ FAM 1ST 30: CPT | Performed by: PSYCHOLOGIST

## 2025-08-28 PROCEDURE — 96168 HLTH BHV IVNTJ FAM EA ADDL: CPT | Performed by: PSYCHOLOGIST

## 2025-09-03 ENCOUNTER — OFFICE VISIT (OUTPATIENT)
Facility: CLINIC | Age: 70
End: 2025-09-03
Payer: MEDICARE

## 2025-09-03 DIAGNOSIS — Z87.820 HISTORY OF TRAUMATIC BRAIN INJURY: ICD-10-CM

## 2025-09-03 DIAGNOSIS — F32.A DEPRESSION, UNSPECIFIED DEPRESSION TYPE: ICD-10-CM

## 2025-09-03 DIAGNOSIS — R53.83 OTHER FATIGUE: ICD-10-CM

## 2025-09-03 DIAGNOSIS — R41.9 COGNITIVE COMPLAINTS: Primary | ICD-10-CM

## 2025-09-03 PROCEDURE — 96168 HLTH BHV IVNTJ FAM EA ADDL: CPT | Performed by: PSYCHOLOGIST

## 2025-09-03 PROCEDURE — 96167 HLTH BHV IVNTJ FAM 1ST 30: CPT | Performed by: PSYCHOLOGIST

## 2025-09-12 ENCOUNTER — APPOINTMENT (OUTPATIENT)
Dept: UROLOGY | Facility: CLINIC | Age: 70
End: 2025-09-12
Payer: MEDICARE

## 2025-09-15 ENCOUNTER — APPOINTMENT (OUTPATIENT)
Dept: PSYCHOLOGY | Facility: CLINIC | Age: 70
End: 2025-09-15
Payer: MEDICARE

## 2025-09-18 ENCOUNTER — APPOINTMENT (OUTPATIENT)
Dept: UROLOGY | Facility: CLINIC | Age: 70
End: 2025-09-18
Payer: MEDICARE

## 2025-09-22 ENCOUNTER — APPOINTMENT (OUTPATIENT)
Dept: NEUROLOGY | Facility: CLINIC | Age: 70
End: 2025-09-22
Payer: MEDICARE

## 2025-09-25 ENCOUNTER — APPOINTMENT (OUTPATIENT)
Dept: UROLOGY | Facility: CLINIC | Age: 70
End: 2025-09-25
Payer: MEDICARE

## 2025-11-04 ENCOUNTER — APPOINTMENT (OUTPATIENT)
Dept: NEUROLOGY | Facility: HOSPITAL | Age: 70
End: 2025-11-04
Payer: MEDICARE

## 2025-12-02 ENCOUNTER — APPOINTMENT (OUTPATIENT)
Dept: OPHTHALMOLOGY | Facility: CLINIC | Age: 70
End: 2025-12-02
Payer: MEDICARE

## 2026-01-07 ENCOUNTER — APPOINTMENT (OUTPATIENT)
Dept: NEUROLOGY | Facility: HOSPITAL | Age: 71
End: 2026-01-07
Payer: MEDICARE

## 2026-01-16 ENCOUNTER — APPOINTMENT (OUTPATIENT)
Dept: PSYCHOLOGY | Facility: CLINIC | Age: 71
End: 2026-01-16
Payer: MEDICARE

## 2026-01-20 ENCOUNTER — APPOINTMENT (OUTPATIENT)
Dept: NEUROLOGY | Facility: CLINIC | Age: 71
End: 2026-01-20
Payer: MEDICARE

## (undated) DEVICE — TUBING, CLEAR N-COND, 5MM X 10, LF

## (undated) DEVICE — Device

## (undated) DEVICE — DRAPE, TIBURON, LITHOTOMY, W/FLUID CONTROL POUCH

## (undated) DEVICE — LOOP, BIPOLAR CUTTING, 24/26 FR, F/URO, 0.35MM, STERILE

## (undated) DEVICE — EVACUATOR, BLADDER, ELLIK, PLASTIC